# Patient Record
Sex: MALE | Race: BLACK OR AFRICAN AMERICAN | NOT HISPANIC OR LATINO | Employment: OTHER | URBAN - METROPOLITAN AREA
[De-identification: names, ages, dates, MRNs, and addresses within clinical notes are randomized per-mention and may not be internally consistent; named-entity substitution may affect disease eponyms.]

---

## 2017-01-30 ENCOUNTER — OFFICE VISIT (OUTPATIENT)
Dept: ORTHOPEDICS | Facility: CLINIC | Age: 52
End: 2017-01-30
Payer: COMMERCIAL

## 2017-01-30 VITALS
WEIGHT: 245 LBS | HEIGHT: 67 IN | HEART RATE: 89 BPM | SYSTOLIC BLOOD PRESSURE: 135 MMHG | BODY MASS INDEX: 38.45 KG/M2 | DIASTOLIC BLOOD PRESSURE: 95 MMHG

## 2017-01-30 DIAGNOSIS — M25.511 RIGHT SHOULDER PAIN, UNSPECIFIED CHRONICITY: Primary | ICD-10-CM

## 2017-01-30 PROCEDURE — 1159F MED LIST DOCD IN RCRD: CPT | Mod: S$GLB,,, | Performed by: ORTHOPAEDIC SURGERY

## 2017-01-30 PROCEDURE — 99213 OFFICE O/P EST LOW 20 MIN: CPT | Mod: S$GLB,,, | Performed by: ORTHOPAEDIC SURGERY

## 2017-01-30 RX ORDER — HYDROCODONE BITARTRATE AND ACETAMINOPHEN 10; 325 MG/1; MG/1
1 TABLET ORAL EVERY 8 HOURS PRN
Qty: 60 TABLET | Refills: 0 | Status: ON HOLD | OUTPATIENT
Start: 2017-01-30 | End: 2017-03-02 | Stop reason: HOSPADM

## 2017-01-30 NOTE — MR AVS SNAPSHOT
[facility]  [address] [facility] [address]  Phone: [phone]  Fax: [phone]                  José Miguel Garcia   2017 9:30 AM   Office Visit    Description:  Male : 1965   Provider:  Ezekiel Schaefer MD   Department:  [facility]           Reason for Visit     Right Shoulder - Pain           Diagnoses this Visit        Comments    Right shoulder pain, unspecified chronicity    -  Primary            To Do List           Goals (5 Years of Data)     None       These Medications        Disp Refills Start End    hydrocodone-acetaminophen 10-325mg (NORCO)  mg Tab 60 tablet 0 2017     Take 1 tablet by mouth every 8 (eight) hours as needed. - Oral    [facility] [address] [place] [address] AT [facility] [address] Ph #: [phone]         [facility] On Call     [facility] On Call Nurse Care Line -  Assistance  Registered nurses in the [facility] provide clinical advisement, health education, appointment booking, and other advisory services.  Call for this free service at [phone].             Medications           Message regarding Medications     Verify the changes and/or additions to your medication regime listed below are the same as discussed with your clinician today.  If any of these changes or additions are incorrect, please notify your healthcare provider.        START taking these NEW medications        Refills    hydrocodone-acetaminophen 10-325mg (NORCO)  mg Tab 0    Sig: Take 1 tablet by mouth every 8 (eight) hours as needed.    Class: Normal    Route: Oral           Verify that the below list of medications is an accurate representation of the medications you are currently taking.  If none reported, the list may be blank. If incorrect, please contact your healthcare provider. Carry this list with you in case of emergency.           Current Medications      "aspirin (ECOTRIN) 81 MG EC tablet Take 1 tablet (81 mg total) by mouth once daily.    hydrocodone-acetaminophen 7.5-325mg (NORCO) 7.5-325 mg per tablet Take 1 tablet by mouth every 8 (eight) hours as needed for Pain.    hydrocodone-acetaminophen 10-325mg (NORCO)  mg Tab Take 1 tablet by mouth every 8 (eight) hours as needed.           Clinical Reference Information           Vital Signs - Last Recorded  Most recent update: 1/30/2017  9:40 AM by Madi Arroyo RN    BP Pulse Ht Wt BMI    (!) 135/95 89 5' 7" (1.702 m) 111.1 kg (245 lb) 38.37 kg/m2      Blood Pressure          Most Recent Value    BP  (!)  135/95 [Ran to help patient that fell in the parking lot.]      Allergies as of 1/30/2017     No Known Allergies      Immunizations Administered on Date of Encounter - 1/30/2017     None      [facility] Sign-Up     Activating your [facility] account is as easy as 1-2-3!     1) Visit [email or web address], select Sign Up Now, enter this activation code and your date of birth, then select Next.  [email or web address]  Expires: 3/16/2017  4:12 PM      2) Create a username and password to use when you visit [facility] in the future and select a security question in case you lose your password and select Next.    3) Enter your e-mail address and click Sign Up!    Additional Information  If you have questions, please e-mail [email or web address] or call [phone] to talk to our [facility] staff. Remember, [facility] is NOT to be used for urgent needs. For medical emergencies, dial 911.         "

## 2017-01-30 NOTE — PROGRESS NOTES
Past Medical History   Diagnosis Date    DVT (deep venous thrombosis) 2011     Approx. 2011, after having a bunion removed. pt was on warfarin for approx. 6 weeks. unsure of time    Screen for colon cancer 11/17/2016       Past Surgical History   Procedure Laterality Date    Vasectomy Bilateral 2011    Bunionectomy Left 2006     Pt developed a blood clot in leg, after having bunion removed. Had to do coumadin for approx. 6 weeks.    Bunionectomy Left 2010     complicated by dvt    Knee surgery       Patient does not recall which side    Colonoscopy N/A 11/17/2016     Procedure: COLONOSCOPY;  Surgeon: Keegan Vo MD;  Location: [facility];  Service: Endoscopy;  Laterality: N/A;       Current Outpatient Prescriptions   Medication Sig    aspirin (ECOTRIN) 81 MG EC tablet Take 1 tablet (81 mg total) by mouth once daily.    hydrocodone-acetaminophen 7.5-325mg (NORCO) 7.5-325 mg per tablet Take 1 tablet by mouth every 8 (eight) hours as needed for Pain.     No current facility-administered medications for this visit.        Review of patient's allergies indicates:  No Known Allergies    Family History   Problem Relation Age of Onset    Heart block Mother     Kidney disease Mother     Cancer Paternal Aunt        Social History     Social History    Marital status: Single     Spouse name: N/A    Number of children: N/A    Years of education: N/A     Occupational History    Not on file.     Social History Main Topics    Smoking status: Never Smoker    Smokeless tobacco: Not on file    Alcohol use Yes    Drug use: No    Sexual activity: Not on file     Other Topics Concern    Not on file     Social History Narrative       Chief Complaint:   Chief Complaint   Patient presents with    Right Shoulder - Pain       Consulting Physician: No ref. provider found    History of present illness:    This is a 51 y.o. year old male who complains of right shoulder pain since an injury on 11/17/16.  He states  "that he was reaching back to  a 16 pound mall and heard a pop in his shoulder.  At the time he was unable to move his shoulder due to weakness and pain.  Since then it has gotten somewhat better but he still has pain that he puts his much as a 7 out of 10 with activities and certain positions.  He states his shoulder also feels weak.    Review of Systems:    Constitution: Denies chills, fever, and sweats.  HENT: Denies headaches or blurry vision.  Cardiovascular: Denies chest pain or irregular heart beat.  Respiratory: Denies cough or shortness of breath.  Gastrointestinal: Denies abdominal pain, nausea, or vomiting.  Musculoskeletal:  Denies muscle cramps.  Neurological: Denies dizziness or focal weakness.  Psychiatric/Behavioral: Normal mental status.  Hematologic/Lymphatic: Denies bleeding problem or easy bruising/bleeding.  Skin: Denies rash or suspicious lesions.    Examination:    Vital Signs:    Vitals:    01/30/17 0935   BP: (!) 135/95   Pulse: 89   Weight: 111.1 kg (245 lb)   Height: 5' 7" (1.702 m)   PainSc:   7   PainLoc: Shoulder       Body mass index is 38.37 kg/(m^2).    This a well-developed, well nourished patient in no acute distress.    Alert and oriented and cooperative to examination.       Physical Exam: Right Shoulder Exam     Skin  Rash:   None  Scars:   None    Inspection/Observation   Swelling:   none  Erythema:   none  Bruising:   none  Wounds:   none  Scapular Winging:  none  Scapular Dyskinesia:  none  Atrophy:   none  Masses:   None  Lymphadenopathy: None    Tenderness   AC Joint:   mild    Range of Motion   Active Forward Flexion:  140  Active External Rotation:  45  Active Internal Rotation:  Hip pocket    Passive Forward Flexion:  160  Passive External Rotation:  45    Muscle Strength   Forward Flexion:  4/5  External Rotation:  5/5  Internal Rotation:  4+/5    Tests & Signs   Cross Arm:   positive  Impingement:   positive    Other   Sensation:   normal  Pulse:    2+ " radial          Imaging: The MRI of his right shoulder shows some tendinosis of the supraspinatus along with what appears to be a SLAP tear.  He also has biceps tendinitis.  He also has arthritis of the acromioclavicular joint.     Assessment: Right shoulder pain, unspecified chronicity        Plan: We discussed with him that I recommended course of action would be a distal clavicle resection and biceps tenodesis to address a SLAP tear.  I don't think he necessarily needs to have a cuff debridement or repair but we can see at the time of surgery.  He would like to talk to his work and see when he can schedule the surgery.  We did discuss the risk and benefits of surgery.    DISCLAIMER: This note may have been dictated using voice recognition software and may contain grammatical errors.     NOTE: Consult report sent to referring provider via [employer] EMR.

## 2017-02-06 ENCOUNTER — TELEPHONE (OUTPATIENT)
Dept: ORTHOPEDICS | Facility: CLINIC | Age: 52
End: 2017-02-06

## 2017-02-06 NOTE — TELEPHONE ENCOUNTER
----- Message from Magan Driver sent at 2/6/2017 11:17 AM CST -----  Contact: Patient  Patient is ready to schedule his surgery.  He is interested in scheduling it for February 16 if at all possible.  He asked to be called on his cell, but also on his work phone.  If you leave a message he'll get right back to you.  Work is [phone].

## 2017-02-06 NOTE — TELEPHONE ENCOUNTER
Returned pt's call.  Advised that surgery can be scheduled for 2/16, however will be at our [place] location.  Pt was agreeable to this.  Pt will come to clinic 2/8/16 to sign surgical consent and get surgical packet for pre op.

## 2017-02-13 PROBLEM — D64.9 ANEMIA: Status: ACTIVE | Noted: 2017-02-13

## 2017-02-15 ENCOUNTER — TELEPHONE (OUTPATIENT)
Dept: ORTHOPEDICS | Facility: CLINIC | Age: 52
End: 2017-02-15

## 2017-02-15 NOTE — TELEPHONE ENCOUNTER
Left message for patient that surgery was denied by his insurance. No physician available for a peer to peer until Friday. Will complete peer to peer on Friday @ 11:30.

## 2017-02-15 NOTE — TELEPHONE ENCOUNTER
----- Message from Vandana Tomlinson sent at 2/15/2017  9:46 AM CST -----  Contact: Josi - Pre-service - service has been denied  Patients surgery has been denied for tomorrow. [id number] can be scheduled at [phone] with pending case number [id number]

## 2017-03-01 ENCOUNTER — ANESTHESIA EVENT (OUTPATIENT)
Dept: SURGERY | Facility: HOSPITAL | Age: 52
End: 2017-03-01
Payer: COMMERCIAL

## 2017-03-02 ENCOUNTER — HOSPITAL ENCOUNTER (OUTPATIENT)
Facility: HOSPITAL | Age: 52
Discharge: HOME | End: 2017-03-02
Attending: ORTHOPAEDIC SURGERY | Admitting: ORTHOPAEDIC SURGERY
Payer: COMMERCIAL

## 2017-03-02 ENCOUNTER — ANESTHESIA (OUTPATIENT)
Dept: SURGERY | Facility: HOSPITAL | Age: 52
End: 2017-03-02
Payer: COMMERCIAL

## 2017-03-02 ENCOUNTER — SURGERY (OUTPATIENT)
Age: 52
End: 2017-03-02

## 2017-03-02 VITALS
HEIGHT: 67 IN | DIASTOLIC BLOOD PRESSURE: 97 MMHG | SYSTOLIC BLOOD PRESSURE: 151 MMHG | WEIGHT: 245 LBS | BODY MASS INDEX: 38.45 KG/M2 | OXYGEN SATURATION: 100 % | HEART RATE: 71 BPM | RESPIRATION RATE: 16 BRPM | TEMPERATURE: 98 F

## 2017-03-02 DIAGNOSIS — M25.511 RIGHT SHOULDER PAIN, UNSPECIFIED CHRONICITY: ICD-10-CM

## 2017-03-02 PROCEDURE — 76942 ECHO GUIDE FOR BIOPSY: CPT | Performed by: ANESTHESIOLOGY

## 2017-03-02 PROCEDURE — D9220A PRA ANESTHESIA: Mod: CRNA,,, | Performed by: NURSE ANESTHETIST, CERTIFIED REGISTERED

## 2017-03-02 PROCEDURE — 63600175 PHARM REV CODE 636 W HCPCS: Performed by: NURSE ANESTHETIST, CERTIFIED REGISTERED

## 2017-03-02 PROCEDURE — D9220A PRA ANESTHESIA: Mod: ANES,,, | Performed by: ANESTHESIOLOGY

## 2017-03-02 PROCEDURE — 99900103 DSU ONLY-NO CHARGE-INITIAL HR (STAT): Performed by: ORTHOPAEDIC SURGERY

## 2017-03-02 PROCEDURE — 29826 SHO ARTHRS SRG DECOMPRESSION: CPT | Mod: RT,,, | Performed by: ORTHOPAEDIC SURGERY

## 2017-03-02 PROCEDURE — 71000015 HC POSTOP RECOV 1ST HR: Performed by: ORTHOPAEDIC SURGERY

## 2017-03-02 PROCEDURE — 36000710: Performed by: ORTHOPAEDIC SURGERY

## 2017-03-02 PROCEDURE — 29827 SHO ARTHRS SRG RT8TR CUF RPR: CPT | Mod: RT,,, | Performed by: ORTHOPAEDIC SURGERY

## 2017-03-02 PROCEDURE — 76942 ECHO GUIDE FOR BIOPSY: CPT | Mod: 26,,, | Performed by: ANESTHESIOLOGY

## 2017-03-02 PROCEDURE — 25000003 PHARM REV CODE 250: Performed by: NURSE ANESTHETIST, CERTIFIED REGISTERED

## 2017-03-02 PROCEDURE — 36000711: Performed by: ORTHOPAEDIC SURGERY

## 2017-03-02 PROCEDURE — 64415 NJX AA&/STRD BRCH PLXS IMG: CPT | Mod: 59,RT,, | Performed by: ANESTHESIOLOGY

## 2017-03-02 PROCEDURE — 99900104 DSU ONLY-NO CHARGE-EA ADD'L HR (STAT): Performed by: ORTHOPAEDIC SURGERY

## 2017-03-02 PROCEDURE — 25000003 PHARM REV CODE 250: Performed by: ORTHOPAEDIC SURGERY

## 2017-03-02 PROCEDURE — 27201423 OPTIME MED/SURG SUP & DEVICES STERILE SUPPLY: Performed by: ORTHOPAEDIC SURGERY

## 2017-03-02 PROCEDURE — 71000016 HC POSTOP RECOV ADDL HR: Performed by: ORTHOPAEDIC SURGERY

## 2017-03-02 PROCEDURE — 37000008 HC ANESTHESIA 1ST 15 MINUTES: Performed by: ORTHOPAEDIC SURGERY

## 2017-03-02 PROCEDURE — 29824 SHO ARTHRS SRG DSTL CLAVICLC: CPT | Mod: 51,RT,, | Performed by: ORTHOPAEDIC SURGERY

## 2017-03-02 PROCEDURE — 25000003 PHARM REV CODE 250: Performed by: ANESTHESIOLOGY

## 2017-03-02 PROCEDURE — 63600175 PHARM REV CODE 636 W HCPCS: Performed by: ORTHOPAEDIC SURGERY

## 2017-03-02 PROCEDURE — C1713 ANCHOR/SCREW BN/BN,TIS/BN: HCPCS | Performed by: ORTHOPAEDIC SURGERY

## 2017-03-02 PROCEDURE — 37000009 HC ANESTHESIA EA ADD 15 MINS: Performed by: ORTHOPAEDIC SURGERY

## 2017-03-02 PROCEDURE — 71000039 HC RECOVERY, EACH ADD'L HOUR: Performed by: ORTHOPAEDIC SURGERY

## 2017-03-02 PROCEDURE — 71000033 HC RECOVERY, INTIAL HOUR: Performed by: ORTHOPAEDIC SURGERY

## 2017-03-02 DEVICE — ANCHOR VERSALOK W/ORTHOCORD: Type: IMPLANTABLE DEVICE | Site: SHOULDER | Status: FUNCTIONAL

## 2017-03-02 DEVICE — ANCHOR HEALIX 5.5 W/NEEDLE: Type: IMPLANTABLE DEVICE | Site: SHOULDER | Status: FUNCTIONAL

## 2017-03-02 RX ORDER — MIDAZOLAM HYDROCHLORIDE 1 MG/ML
INJECTION INTRAMUSCULAR; INTRAVENOUS
Status: DISCONTINUED | OUTPATIENT
Start: 2017-03-02 | End: 2017-03-02

## 2017-03-02 RX ORDER — IBUPROFEN 400 MG/1
800 TABLET ORAL 3 TIMES DAILY
Status: CANCELLED | OUTPATIENT
Start: 2017-03-02

## 2017-03-02 RX ORDER — CEFAZOLIN SODIUM 2 G/50ML
2 SOLUTION INTRAVENOUS
Status: COMPLETED | OUTPATIENT
Start: 2017-03-02 | End: 2017-03-02

## 2017-03-02 RX ORDER — ONDANSETRON HYDROCHLORIDE 2 MG/ML
INJECTION, SOLUTION INTRAMUSCULAR; INTRAVENOUS
Status: DISCONTINUED | OUTPATIENT
Start: 2017-03-02 | End: 2017-03-02

## 2017-03-02 RX ORDER — HYDROCODONE BITARTRATE AND ACETAMINOPHEN 10; 325 MG/1; MG/1
1 TABLET ORAL EVERY 4 HOURS PRN
Status: CANCELLED | OUTPATIENT
Start: 2017-03-02

## 2017-03-02 RX ORDER — LIDOCAINE HCL/PF 100 MG/5ML
SYRINGE (ML) INTRAVENOUS
Status: DISCONTINUED | OUTPATIENT
Start: 2017-03-02 | End: 2017-03-02

## 2017-03-02 RX ORDER — FENTANYL CITRATE 50 UG/ML
INJECTION, SOLUTION INTRAMUSCULAR; INTRAVENOUS
Status: DISCONTINUED | OUTPATIENT
Start: 2017-03-02 | End: 2017-03-02

## 2017-03-02 RX ORDER — PROPOFOL 10 MG/ML
VIAL (ML) INTRAVENOUS
Status: DISCONTINUED | OUTPATIENT
Start: 2017-03-02 | End: 2017-03-02

## 2017-03-02 RX ORDER — ROCURONIUM BROMIDE 10 MG/ML
INJECTION, SOLUTION INTRAVENOUS
Status: DISCONTINUED | OUTPATIENT
Start: 2017-03-02 | End: 2017-03-02

## 2017-03-02 RX ORDER — OXYCODONE AND ACETAMINOPHEN 10; 325 MG/1; MG/1
1 TABLET ORAL EVERY 6 HOURS PRN
Qty: 60 TABLET | Refills: 0 | Status: SHIPPED | OUTPATIENT
Start: 2017-03-02 | End: 2017-03-15 | Stop reason: SDUPTHER

## 2017-03-02 RX ORDER — DEXAMETHASONE SODIUM PHOSPHATE 4 MG/ML
INJECTION, SOLUTION INTRA-ARTICULAR; INTRALESIONAL; INTRAMUSCULAR; INTRAVENOUS; SOFT TISSUE
Status: DISCONTINUED | OUTPATIENT
Start: 2017-03-02 | End: 2017-03-02

## 2017-03-02 RX ORDER — SODIUM CHLORIDE 0.9 % (FLUSH) 0.9 %
3 SYRINGE (ML) INJECTION
Status: DISCONTINUED | OUTPATIENT
Start: 2017-03-02 | End: 2017-03-02 | Stop reason: HOSPADM

## 2017-03-02 RX ORDER — BUPIVACAINE HYDROCHLORIDE 5 MG/ML
INJECTION, SOLUTION EPIDURAL; INTRACAUDAL
Status: DISCONTINUED | OUTPATIENT
Start: 2017-03-02 | End: 2017-03-02 | Stop reason: HOSPADM

## 2017-03-02 RX ORDER — SUCCINYLCHOLINE CHLORIDE 20 MG/ML
INJECTION INTRAMUSCULAR; INTRAVENOUS
Status: DISCONTINUED | OUTPATIENT
Start: 2017-03-02 | End: 2017-03-02

## 2017-03-02 RX ORDER — LIDOCAINE HYDROCHLORIDE 10 MG/ML
1 INJECTION, SOLUTION EPIDURAL; INFILTRATION; INTRACAUDAL; PERINEURAL ONCE
Status: DISCONTINUED | OUTPATIENT
Start: 2017-03-02 | End: 2017-03-02 | Stop reason: HOSPADM

## 2017-03-02 RX ORDER — FENTANYL CITRATE 50 UG/ML
25 INJECTION, SOLUTION INTRAMUSCULAR; INTRAVENOUS EVERY 5 MIN PRN
Status: DISCONTINUED | OUTPATIENT
Start: 2017-03-02 | End: 2017-03-02 | Stop reason: HOSPADM

## 2017-03-02 RX ORDER — OXYCODONE HYDROCHLORIDE 5 MG/1
5 TABLET ORAL
Status: DISCONTINUED | OUTPATIENT
Start: 2017-03-02 | End: 2017-03-02 | Stop reason: HOSPADM

## 2017-03-02 RX ORDER — HYDROCODONE BITARTRATE AND ACETAMINOPHEN 5; 325 MG/1; MG/1
1 TABLET ORAL EVERY 4 HOURS PRN
Status: CANCELLED | OUTPATIENT
Start: 2017-03-02

## 2017-03-02 RX ORDER — EPINEPHRINE 1 MG/ML
INJECTION, SOLUTION INTRACARDIAC; INTRAMUSCULAR; INTRAVENOUS; SUBCUTANEOUS
Status: DISCONTINUED | OUTPATIENT
Start: 2017-03-02 | End: 2017-03-02 | Stop reason: HOSPADM

## 2017-03-02 RX ORDER — IBUPROFEN 600 MG/1
600 TABLET ORAL 4 TIMES DAILY
Qty: 60 TABLET | Refills: 0 | Status: SHIPPED | OUTPATIENT
Start: 2017-03-02 | End: 2017-03-15 | Stop reason: SDUPTHER

## 2017-03-02 RX ORDER — SODIUM CHLORIDE, SODIUM LACTATE, POTASSIUM CHLORIDE, CALCIUM CHLORIDE 600; 310; 30; 20 MG/100ML; MG/100ML; MG/100ML; MG/100ML
INJECTION, SOLUTION INTRAVENOUS CONTINUOUS
Status: DISCONTINUED | OUTPATIENT
Start: 2017-03-02 | End: 2017-03-02 | Stop reason: HOSPADM

## 2017-03-02 RX ORDER — METOCLOPRAMIDE HYDROCHLORIDE 5 MG/ML
10 INJECTION INTRAMUSCULAR; INTRAVENOUS EVERY 10 MIN PRN
Status: DISCONTINUED | OUTPATIENT
Start: 2017-03-02 | End: 2017-03-02 | Stop reason: HOSPADM

## 2017-03-02 RX ADMIN — ROCURONIUM BROMIDE 40 MG: 10 INJECTION, SOLUTION INTRAVENOUS at 07:03

## 2017-03-02 RX ADMIN — PROPOFOL 200 MG: 10 INJECTION, EMULSION INTRAVENOUS at 07:03

## 2017-03-02 RX ADMIN — DEXAMETHASONE SODIUM PHOSPHATE 4 MG: 4 INJECTION, SOLUTION INTRAMUSCULAR; INTRAVENOUS at 07:03

## 2017-03-02 RX ADMIN — SODIUM CHLORIDE, SODIUM LACTATE, POTASSIUM CHLORIDE, AND CALCIUM CHLORIDE: .6; .31; .03; .02 INJECTION, SOLUTION INTRAVENOUS at 06:03

## 2017-03-02 RX ADMIN — CEFAZOLIN SODIUM 2 G: 2 SOLUTION INTRAVENOUS at 08:03

## 2017-03-02 RX ADMIN — ROCURONIUM BROMIDE 10 MG: 10 INJECTION, SOLUTION INTRAVENOUS at 07:03

## 2017-03-02 RX ADMIN — MIDAZOLAM HYDROCHLORIDE 1 MG: 1 INJECTION, SOLUTION INTRAMUSCULAR; INTRAVENOUS at 06:03

## 2017-03-02 RX ADMIN — EPINEPHRINE 1 MG: 1 INJECTION, SOLUTION INTRAMUSCULAR; SUBCUTANEOUS at 08:03

## 2017-03-02 RX ADMIN — BUPIVACAINE HYDROCHLORIDE 30 ML: 5 INJECTION, SOLUTION EPIDURAL; INTRACAUDAL; PERINEURAL at 10:03

## 2017-03-02 RX ADMIN — ONDANSETRON 4 MG: 2 INJECTION, SOLUTION INTRAMUSCULAR; INTRAVENOUS at 07:03

## 2017-03-02 RX ADMIN — FENTANYL CITRATE 50 MCG: 50 INJECTION, SOLUTION INTRAMUSCULAR; INTRAVENOUS at 06:03

## 2017-03-02 RX ADMIN — SUCCINYLCHOLINE CHLORIDE 140 MG: 20 INJECTION, SOLUTION INTRAMUSCULAR; INTRAVENOUS at 07:03

## 2017-03-02 RX ADMIN — FENTANYL CITRATE 50 MCG: 50 INJECTION, SOLUTION INTRAMUSCULAR; INTRAVENOUS at 10:03

## 2017-03-02 RX ADMIN — LIDOCAINE HYDROCHLORIDE 100 MG: 20 INJECTION, SOLUTION INTRAVENOUS at 07:03

## 2017-03-02 RX ADMIN — MIDAZOLAM HYDROCHLORIDE 1 MG: 1 INJECTION, SOLUTION INTRAMUSCULAR; INTRAVENOUS at 07:03

## 2017-03-02 NOTE — PLAN OF CARE
"Patient awake and calm, discussed wanting to eat a "olpes egg and cheese sandwich", pt tolerating clear liquid no nausea, denies pain.  Discussed NWB with his right extremity  Pt is able to maintain airway   On room air 100 %  "

## 2017-03-02 NOTE — ANESTHESIA POSTPROCEDURE EVALUATION
"Anesthesia Post Evaluation    Patient: José Miguel Garcia    Procedure(s) Performed: Procedure(s) (LRB):  REPAIR ROTATOR CUFF ARTHROSCOPIC (Right)  [id number]-[id number]-[id number] END (Right)  ARTHROSCOPY-SHOULDER WITH SUBACROMIAL DECOMPRESSION (Right)    Final Anesthesia Type: general  Patient location during evaluation: PACU  Patient participation: Yes- Able to Participate  Level of consciousness: awake and alert and oriented  Post-procedure vital signs: reviewed and stable  Pain management: adequate  Airway patency: patent  PONV status at discharge: No PONV  Anesthetic complications: no      Cardiovascular status: blood pressure returned to baseline  Respiratory status: unassisted, spontaneous ventilation and room air  Hydration status: euvolemic  Follow-up not needed.        Visit Vitals    BP (!) 163/90    Pulse 90    Temp 36.4 °C (97.6 °F) (Oral)    Resp 18    Ht 5' 7" (1.702 m)    Wt 111.1 kg (245 lb)    SpO2 100%    BMI 38.37 kg/m2       Pain/Shyanne Score: Pain Assessment Performed: Yes (3/2/2017 10:30 AM)  Presence of Pain: non-verbal indicators absent (3/2/2017 10:30 AM)  Shyanne Score: 4 (3/2/2017 10:30 AM)      "

## 2017-03-02 NOTE — H&P
Past Medical History   Diagnosis Date    DVT (deep venous thrombosis) 2011       Approx. 2011, after having a bunion removed. pt was on warfarin for approx. 6 weeks. unsure of time    Screen for colon cancer 11/17/2016                Past Surgical History   Procedure Laterality Date    Vasectomy Bilateral 2011    Bunionectomy Left 2006       Pt developed a blood clot in leg, after having bunion removed. Had to do coumadin for approx. 6 weeks.    Bunionectomy Left 2010       complicated by dvt    Knee surgery           Patient does not recall which side    Colonoscopy N/A 11/17/2016       Procedure: COLONOSCOPY; Surgeon: Keegan Vo MD; Location: [facility]; Service: Endoscopy; Laterality: N/A;              Current Outpatient Prescriptions   Medication Sig    aspirin (ECOTRIN) 81 MG EC tablet Take 1 tablet (81 mg total) by mouth once daily.    hydrocodone-acetaminophen 7.5-325mg (NORCO) 7.5-325 mg per tablet Take 1 tablet by mouth every 8 (eight) hours as needed for Pain.      No current facility-administered medications for this visit.          Review of patient's allergies indicates:  No Known Allergies           Family History   Problem Relation Age of Onset    Heart block Mother      Kidney disease Mother      Cancer Paternal Aunt            Social History    Social History            Social History    Marital status: Single       Spouse name: N/A    Number of children: N/A    Years of education: N/A          Occupational History    Not on file.           Social History Main Topics    Smoking status: Never Smoker    Smokeless tobacco: Not on file    Alcohol use Yes    Drug use: No    Sexual activity: Not on file           Other Topics Concern    Not on file      Social History Narrative            Chief Complaint:       Chief Complaint   Patient presents with    Right Shoulder - Pain         Consulting Physician: No ref. provider found     History of present illness:     This is a 51  y.o. year old male who complains of right shoulder pain since an injury on 11/17/16. He states that he was reaching back to  a 16 pound mall and heard a pop in his shoulder. At the time he was unable to move his shoulder due to weakness and pain. Since then it has gotten somewhat better but he still has pain that he puts his much as a 7 out of 10 with activities and certain positions. He states his shoulder also feels weak.     Review of Systems:     Constitution: Denies chills, fever, and sweats.  HENT: Denies headaches or blurry vision.  Cardiovascular: Denies chest pain or irregular heart beat.  Respiratory: Denies cough or shortness of breath.  Gastrointestinal: Denies abdominal pain, nausea, or vomiting.  Musculoskeletal: Denies muscle cramps.  Neurological: Denies dizziness or focal weakness.  Psychiatric/Behavioral: Normal mental status.  Hematologic/Lymphatic: Denies bleeding problem or easy bruising/bleeding.  Skin: Denies rash or suspicious lesions.     Examination:      Body mass index is 38.37 kg/(m^2).     This a well-developed, well nourished patient in no acute distress.     Alert and oriented and cooperative to examination.      Physical Exam: Right Shoulder Exam      Skin  Rash:   None  Scars:   None     Inspection/Observation   Swelling:   none  Erythema:   none  Bruising:   none  Wounds:   none  Scapular Winging:  none  Scapular Dyskinesia:  none  Atrophy:   none  Masses:   None  Lymphadenopathy: None     Tenderness   AC Joint:   mild     Range of Motion   Active Forward Flexion:  140  Active External Rotation:  45  Active Internal Rotation:  Hip pocket     Passive Forward Flexion:  160  Passive External Rotation:  45     Muscle Strength   Forward Flexion:  4/5  External Rotation:  5/5  Internal Rotation:  4+/5     Tests & Signs   Cross Arm:   positive  Impingement:   positive     Other   Sensation:   normal  Pulse:    2+ radial              Imaging: The MRI of his right shoulder shows some  tendinosis of the supraspinatus along with what appears to be a SLAP tear. He also has biceps tendinitis. He also has arthritis of the acromioclavicular joint.  Assessment: Right shoulder pain, unspecified chronicity           Plan: We discussed with him that I recommended course of action would be a distal clavicle resection and biceps tenodesis to address a SLAP tear. I don't think he necessarily needs to have a cuff debridement or repair but we can see at the time of surgery. He would like to talk to his work and see when he can schedule the surgery. We did discuss the risk and benefits of surgery.     DISCLAIMER: This note may have been dictated using voice recognition software and may contain grammatical errors.

## 2017-03-02 NOTE — TRANSFER OF CARE
"Anesthesia Transfer of Care Note    Patient: José Miguel Garcia    Procedure(s) Performed: Procedure(s) (LRB):  REPAIR ROTATOR CUFF ARTHROSCOPIC (Right)  [id number]-[id number] END (Right)  ARTHROSCOPY-SHOULDER WITH SUBACROMIAL DECOMPRESSION (Right)    Patient location: PACU    Anesthesia Type: general and regional    Transport from OR: Transported from OR on 2-3 L/min O2 by NC with adequate spontaneous ventilation    Post pain: adequate analgesia    Post assessment: no apparent anesthetic complications and tolerated procedure well    Post vital signs: stable    Level of consciousness: awake    Nausea/Vomiting: no nausea/vomiting    Complications: none          Last vitals:   Visit Vitals    BP (!) 148/87 (BP Location: Left arm, Patient Position: Lying, BP Method: Automatic)    Pulse 69    Temp 36.7 °C (98.1 °F) (Oral)    Resp 18    Ht 5' 7" (1.702 m)    Wt 111.1 kg (245 lb)    SpO2 100%    BMI 38.37 kg/m2     "

## 2017-03-02 NOTE — BRIEF OP NOTE
[facility]  Brief Operative Note     SUMMARY     Surgery Date: 3/2/2017     Surgeon(s) and Role:     * Ezekiel Schaefer MD - Primary    Assisting Surgeon: None    Pre-op Diagnosis:  Right shoulder pain, unspecified chronicity [M25.511]    Post-op Diagnosis:  Post-Op Diagnosis Codes:     * Right shoulder pain, unspecified chronicity [M25.511]    Procedure(s) (LRB):  REPAIR ROTATOR CUFF ARTHROSCOPIC (Right)  [id number]-[id number] END (Right)  ARTHROSCOPY-SHOULDER WITH SUBACROMIAL DECOMPRESSION (Right)    Anesthesia: General    Description of the findings of the procedure: right arthroscopic rotator cuff repair, distal clavicle resection, subacromial decompression    Findings/Key Components: See Dictation     Estimated Blood Loss: * No values recorded between 3/2/2017  8:30 AM and 3/2/2017 10:16 AM *         Specimens:   Specimen     None          Discharge Note    SUMMARY     Admit Date: 3/2/2017    Discharge Date and Time: 3/2/2017     Hospital Course : Patient Tolerated Procedure Well      Final Diagnosis: Post-Op Diagnosis Codes:     * Right shoulder pain, unspecified chronicity [M25.511]    Disposition: Home or Self Care    Follow Up/Patient Instructions:     Medications:  Reconciled Home Medications: Current Discharge Medication List      START taking these medications    Details   ibuprofen (ADVIL,MOTRIN) 600 MG tablet Take 1 tablet (600 mg total) by mouth 4 (four) times daily.  Qty: 60 tablet, Refills: 0      oxycodone-acetaminophen (PERCOCET)  mg per tablet Take 1 tablet by mouth every 6 (six) hours as needed for Pain.  Qty: 60 tablet, Refills: 0         STOP taking these medications       hydrocodone-acetaminophen 10-325mg (NORCO)  mg Tab Comments:   Reason for Stopping:               Discharge Procedure Orders  Diet general     Activity as tolerated     Shower on day dressing removed (No bath)     Ice to affected area     Lifting restrictions     Non weight bearing     No  driving, operating heavy equipment or signing legal documents while taking pain medication     Call MD for:  temperature >100.4     Call MD for:  persistent nausea and vomiting     Call MD for:  severe uncontrolled pain     Call MD for:  difficulty breathing, headache or visual disturbances     Call MD for:  redness, tenderness, or signs of infection (pain, swelling, redness, odor or green/yellow discharge around incision site)     Call MD for:  hives     Call MD for:  persistent dizziness or light-headedness     Call MD for:  extreme fatigue     Remove dressing in 48 hours       Follow-up Information     Follow up with Ezekiel Schaefer MD.    Specialties:  Sports Medicine, Orthopedic Surgery    Contact information:    [address] [place] [id number]  [phone]        Dictation #1  MRN:[id number]  CSN:[id number]  [id number]

## 2017-03-02 NOTE — OR NURSING
Discharge instructions discussed with patient and father.  Informed of e-prescribed rx to pharmacy of record.  Patient instructed on use of ice man and immolizer at all times except during showering until his follow up appointment with Dr. Schaefer.  Verbalized understanding.

## 2017-03-02 NOTE — IP AVS SNAPSHOT
[address] [place] [address]  Phone: [phone]           Patient Discharge Instructions     Our goal is to set you up for success. This packet includes information on your condition, medications, and your home care. It will help you to care for yourself so you don't get sicker and need to go back to the hospital.     Please ask your nurse if you have any questions.        There are many details to remember when preparing to leave the hospital. Here is what you will need to do:    1. Take your medicine. If you are prescribed medications, review your Medication List in the following pages. You may have new medications to  at the pharmacy and others that you'll need to stop taking. Review the instructions for how and when to take your medications. Talk with your doctor or nurses if you are unsure of what to do.     2. Go to your follow-up appointments. Specific follow-up information is listed in the following pages. Your may be contacted by a transition nurse or clinical provider about future appointments. Be sure we have all of the phone numbers to reach you, if needed. Please contact your provider's office if you are unable to make an appointment.     3. Watch for warning signs. Your doctor or nurse will give you detailed warning signs to watch for and when to call for assistance. These instructions may also include educational information about your condition. If you experience any of warning signs to your health, call your doctor.               [facility]  Unless otherwise directed by your provider, please contact [facility], our nurse care line that is available for 24/7 assistance.     [phone] (toll-free)    Registered nurses in the [facility] provide clinical advisement, health education, appointment booking, and other advisory services.                    ** Verify the list of medication(s) below is accurate and up to date.  Carry this with you in case of emergency. If your medications have changed, please notify your healthcare provider.             Medication List      START taking these medications        Additional Info                      ibuprofen 600 MG tablet   Commonly known as:  ADVIL,MOTRIN   Quantity:  60 tablet   Refills:  0   Dose:  600 mg    Instructions:  Take 1 tablet (600 mg total) by mouth 4 (four) times daily.     Begin Date    AM    Noon    PM    Bedtime       oxycodone-acetaminophen  mg per tablet   Commonly known as:  PERCOCET   Quantity:  60 tablet   Refills:  0   Dose:  1 tablet    Instructions:  Take 1 tablet by mouth every 6 (six) hours as needed for Pain.     Begin Date    AM    Noon    PM    Bedtime         STOP taking these medications     hydrocodone-acetaminophen 10-325mg  mg Tab   Commonly known as:  NORCO            Where to Get Your Medications      These medications were sent to [facility] [address] [place] [address] AT [facility] [address] [address]     Phone:  [phone]     ibuprofen 600 MG tablet    oxycodone-acetaminophen  mg per tablet                  Please bring to all follow up appointments:    1. A copy of your discharge instructions.  2. All medicines you are currently taking in their original bottles.  3. Identification and insurance card.    Please arrive 15 minutes ahead of scheduled appointment time.    Please call 24 hours in advance if you must reschedule your appointment and/or time.        Your Scheduled Appointments     Mar 15, 2017 10:15 AM CDT   Post OP with Ezekiel Schaefer MD   [place] [facility]    [address] [facility] [place] [address] [phone]            Mar 17, 2017  8:30 AM CDT   Established Patient with Lisa Y. Cooksey, NP Rowe S. Crowder III, M.D. ([facility])    [address]  [facility] [place] [address] [phone]            Oct 10, 2017  9:00 AM CDT    LAB-OCC with  - LAB   Abigail Wang III, M.D. (OCC)    [address]  [facility] [place] [address] [phone]            Oct 13, 2017  9:50 AM CDT   Established Patient with MD Abigail Sprague III, III, M.D. (OCC)    [address]  [facility] [place] [address] [phone]              Follow-up Information     Follow up with Ezekiel Schaefer MD.    Specialties:  Sports Medicine, Orthopedic Surgery    Contact information:    [address] [facility] [place] [address] [phone]          Discharge Instructions     Future Orders    Activity as tolerated     Call MD for:  difficulty breathing, headache or visual disturbances     Call MD for:  extreme fatigue     Call MD for:  hives     Call MD for:  persistent dizziness or light-headedness     Call MD for:  persistent nausea and vomiting     Call MD for:  redness, tenderness, or signs of infection (pain, swelling, redness, odor or green/yellow discharge around incision site)     Call MD for:  severe uncontrolled pain     Call MD for:  temperature >100.4     Diet general     Questions:    Total calories:      Fat restriction, if any:      Protein restriction, if any:      Na restriction, if any:      Fluid restriction:      Additional restrictions:      Lifting restrictions     No driving, operating heavy equipment or signing legal documents while taking pain medication     Non weight bearing     Remove dressing in 48 hours     Shower on day dressing removed (No bath)         Discharge Instructions       We hope your stay was comfortable as you heal now, mend and rest.    For we have enjoyed taking care of you by giving your our best.    And as you get better, by regaining your health and strength;   We count it as a privilege to have served you and hope your time at [facility] was well spent.      Thank  You!!!Discharge Instructions: After Your Surgery/Procedure  Youve just had surgery. During surgery you were given medicine  "called anesthesia to keep you relaxed and free of pain. After surgery you may have some pain or nausea. This is common. Here are some tips for feeling better and getting well after surgery.     Stay on schedule with your medication.   Going home  Your doctor or nurse will show you how to take care of yourself when you go home. He or she will also answer your questions. Have an adult family member or friend drive you home.      For your safety we recommend these precaution for the first 24 hours after your procedure:  · Do not drive or use heavy equipment.  · Do not make important decisions or sign legal papers.  · Do not drink alcohol.  · Have someone stay with you, if needed. He or she can watch for problems and help keep you safe.  · Your concentration, balance, coordination, and judgement may be impaired for many hours after anesthesia.  Use caution when ambulating or standing up.     · You may feel weak and "washed out" after anesthesia and surgery.      Subtle residual effects of general anesthesia or sedation with regional / local anesthesia can last more than 24 hours.  Rest for the remainder of the day or longer if your Doctor/Surgeon has advised you to do so.  Although you may feel normal within the first 24 hours, your reflexes and mental ability may be impaired without you realizing it.  You may feel dizzy, lightheaded or sleepy for 24 hours or longer.      Be sure to go to all follow-up visits with your doctor. And rest after your surgery for as long as your doctor tells you to.  Coping with pain  If you have pain after surgery, pain medicine will help you feel better. Take it as told, before pain becomes severe. Also, ask your doctor or pharmacist about other ways to control pain. This might be with heat, ice, or relaxation. And follow any other instructions your surgeon or nurse gives you.  Tips for taking pain medicine  To get the best relief possible, remember these points:  · Pain medicines can " upset your stomach. Taking them with a little food may help.  · Most pain relievers taken by mouth need at least 20 to 30 minutes to start to work.  · Taking medicine on a schedule can help you remember to take it. Try to time your medicine so that you can take it before starting an activity. This might be before you get dressed, go for a walk, or sit down for dinner.  · Constipation is a common side effect of pain medicines. Call your doctor before taking any medicines such as laxatives or stool softeners to help ease constipation. Also ask if you should skip any foods. Drinking lots of fluids and eating foods such as fruits and vegetables that are high in fiber can also help. Remember, do not take laxatives unless your surgeon has prescribed them.  · Drinking alcohol and taking pain medicine can cause dizziness and slow your breathing. It can even be deadly. Do not drink alcohol while taking pain medicine.  · Pain medicine can make you react more slowly to things. Do not drive or run machinery while taking pain medicine.  Your health care provider may tell you to take acetaminophen to help ease your pain. Ask him or her how much you are supposed to take each day. Acetaminophen or other pain relievers may interact with your prescription medicines or other over-the-counter (OTC) drugs. Some prescription medicines have acetaminophen and other ingredients. Using both prescription and OTC acetaminophen for pain can cause you to overdose. Read the labels on your OTC medicines with care. This will help you to clearly know the list of ingredients, how much to take, and any warnings. It may also help you not take too much acetaminophen. If you have questions or do not understand the information, ask your pharmacist or health care provider to explain it to you before you take the OTC medicine.  Managing nausea  Some people have an upset stomach after surgery. This is often because of anesthesia, pain, or pain medicine, or  the stress of surgery. These tips will help you handle nausea and eat healthy foods as you get better. If you were on a special food plan before surgery, ask your doctor if you should follow it while you get better. These tips may help:  · Do not push yourself to eat. Your body will tell you when to eat and how much.  · Start off with clear liquids and soup. They are easier to digest.  · Next try semi-solid foods, such as mashed potatoes, applesauce, and gelatin, as you feel ready.  · Slowly move to solid foods. Dont eat fatty, rich, or spicy foods at first.  · Do not force yourself to have 3 large meals a day. Instead eat smaller amounts more often.  · Take pain medicines with a small amount of solid food, such as crackers or toast, to avoid nausea.     Call your surgeon if  · You still have pain an hour after taking medicine. The medicine may not be strong enough.  · You feel too sleepy, dizzy, or groggy. The medicine may be too strong.  · You have side effects like nausea, vomiting, or skin changes, such as rash, itching, or hives.       If you have obstructive sleep apnea  You were given anesthesia medicine during surgery to keep you comfortable and free of pain. After surgery, you may have more apnea spells because of this medicine and other medicines you were given. The spells may last longer than usual.   At home:  · Keep using the continuous positive airway pressure (CPAP) device when you sleep. Unless your health care provider tells you not to, use it when you sleep, day or night. CPAP is a common device used to treat obstructive sleep apnea.  · Talk with your provider before taking any pain medicine, muscle relaxants, or sedatives. Your provider will tell you about the possible dangers of taking these medicines.  © 0465-1329 [employer] [address] [place] [address], [place] [address]. All rights reserved. This information is not intended as a substitute for professional medical care. Always  follow your healthcare professional's instructions.    General Information:    1.  Do not drink alcoholic beverages including beer for 24 hours or as long as you are on pain medication..  2.  Do not drive a motor vehicle, operate machinery or power tools, or signs legal papers for 24 hours or as long as you are on pain medication.   3.  You may experience light-headedness, dizziness, and sleepiness following surgery. Please do not stay alone. A responsible adult should be with you for this 24 hour period.  4.  Go home and rest.    5. Progress slowly to a normal diet unless instructed.  Otherwise, begin with liquids such as soft drinks, then soup and crackers working up to solid foods. Drink plenty of nonalcoholic fluids.  6.  Certain anesthetics and pain medications produce nausea and vomiting in certain       individuals. If nausea becomes a problem at home, call you doctor.    7. A nurse will be calling you sometime after surgery. Do not be alarmed. This is our way of finding out how you are doing.    8. Several times every hour while you are awake, take 2-3 deep breaths and cough. If you had stomach surgery hold a pillow or rolled towel firmly against your stomach before you cough. This will help with any pain the cough might cause.  9. Several times every hour while you are awake, pump and flex your feet 5-6 times and do foot circles. This will help prevent blood clots.    10.Call your doctor for severe pain, bleeding, fever, or signs or symptoms of infection (pain, swelling, redness, foul odor, drainage).      Discharge Instructions for Shoulder Arthroscopy  You had a shoulder arthroscopy. It is a surgical procedure that helps the healthcare provider diagnose and treat shoulder problems. These include instability, arthritis, and rotator cuff problems. Below are instructions to help you care for your shoulder when you are at home.  What to expect  After surgery, your joint may be swollen, painful, and stiff. The  joint will heal with time. But, recovery times vary depending on what was done. For example, with a shaved rotator cuff, you may be told to move your arm soon after surgery to prevent stiffness. But if the rotator cuff is repaired or treatment is for instability or arthritis, your healthcare provider may want you to limit movement of your arm for a period of time. Follow your healthcare providers instructions regarding arm movement.  Activity     You may be told to do daily pendulum swings to improve your joints flexibility. Use your torso to move your arm in a [place], first in one direction, then the other.   · Dont drive until your healthcare provider says its OK. And never drive while taking opioid pain medicine.  · Ask your healthcare provider when it is safe to do Pendulum exercises:    ¨ Hold on to the back of a chair, or lean on a tabletop with your healthy arm.  ¨ Do not actively move your arm with your shoulder muscles during this process. Instead, allow your arm to sway gently.    ¨ Use your torso to move your affected arm in a [place]. First do 20 circles in one direction. Then do 20 circles in the other direction.  ¨ Repeat the pendulum exercise every 2 hour(s). When you feel ready, increase the number of circles to 50 in each direction, every 2 hour(s).  · Bend your wrist and wiggle your fingers often to help blood flow.  Incision care  · Check your incision daily for redness, tenderness, or drainage.  · Wait 3 day(s) after your surgery to begin showering. Then shower as needed. Carefully wash your incision with soap and water. Gently pat it dry. Dont rub the incision, or apply creams or lotions to it.  · Dont soak in a bathtub, hot tub, or pool until your healthcare provider says its OK.  Other home care  · Take your temperature daily for 7 days after your surgery. Report a fever above 100.4º F (38º C) to your healthcare provider. Fever may be a sign of infection.  · Wear your sling or  immobilizer as directed by your healthcare provider.  · Use pain medicine, as needed and as directed. Don't wait until the pain is severe to start using the medicine.   · Use an ice pack or a bag of frozen peas--or something similar--wrapped in a thin towel on your shoulder to reduce swelling for the first 48 hours after surgery. Hold the ice pack. Leave the ice pack on for 20 minutes; then take it off for 20 minutes. Repeat as needed.  Follow-up  Make a follow-up appointment as directed by your healthcare provider.  When to seek medical attention  Call 911 right away if you have any of the following:  · Chest pain  · Shortness of breath  Otherwise, call your healthcare provider immediately if you have any of the following:  · Increasing shoulder pain or pain not relieved by medicine  · Pain or swelling in the arm on the side of your surgery  · Numbness, tingling, or blue-gray color of your arm or fingers on the side of your surgery  · Drainage or oozing, redness, or warmth at the incision  · Fever above 100.4º F (38º C) or shaking chills  · Nausea or vomiting   Date Last Reviewed: 11/16/2015  © 4778-6059 [employer]. [address] [place] [address]. All rights reserved. This information is not intended as a substitute for professional medical care. Always follow your healthcare professional's instructions.            Admission Information     Date & Time Provider Department CSN    3/2/2017  5:41 AM Ezkeiel Schaefer MD [facility] [id number]      Care Providers     Provider Role Specialty Primary office phone    Ezekiel Schaefer MD Attending Provider Sports Medicine [phone]    Ezekiel Schaefer MD Surgeon  Sports Medicine [phone]      Your Vitals Were     BP                   147/89           Recent Lab Values     No lab values to display.      Allergies as of 3/2/2017     No Known Allergies      Advance Directives     An advance directive is a document which, in  the event you are no longer able to make decisions for yourself, tells your healthcare team what kind of treatment you do or do not want to receive, or who you would like to make those decisions for you.  If you do not currently have an advance directive, [facility] encourages you to create one.  For more information call:  [phone]), [phone] ([phone]),  or log on to [email or web address]        Language Assistance Services     ATTENTION: Language assistance services are available, free of charge. Please call [phone].      ATENCIÓN: Si habla yeimy, tiene a espinoza disposición servicios gratuitos de asistencia lingüística. Llame al [phone].     CHÚ Ý: N?u b?n nói Ti?ng Vi?t, có các d?ch v? h? tr? ngôn ng? mi?n phí dành cho b?n. G?i s? [phone].        [facility] Sign-Up     Activating your [facility] account is as easy as 1-2-3!     1) Visit [email or web address], select Sign Up Now, enter this activation code and your date of birth, then select Next.  [email or web address]  Expires: 3/16/2017  4:12 PM      2) Create a username and password to use when you visit [facility] in the future and select a security question in case you lose your password and select Next.    3) Enter your e-mail address and click Sign Up!    Additional Information  If you have questions, please e-mail [email or web address] or call [phone] to talk to our [facility] staff. Remember, [facility] is NOT to be used for urgent needs. For medical emergencies, dial 911.          [facility] complies with applicable Federal civil rights laws and does not discriminate on the basis of race, color, national origin, age, disability, or sex.

## 2017-03-02 NOTE — OP NOTE
DATE OF PROCEDURE:  03/02/2017    PREOPERATIVE DIAGNOSES:  1.  Right shoulder SLAP tear.  2.  Right shoulder rotator cuff tendinitis.  3.  Acromioclavicular joint arthritis.    POSTOPERATIVE DIAGNOSES:  1.  Right shoulder rotator cuff tear.  2.  Right shoulder biceps tear.  3.  Acromioclavicular joint arthritis.    PROCEDURES:  1.  Right shoulder arthroscopic rotator cuff repair.  2.  Right shoulder arthroscopic distal clavicle resection.  3.  Right shoulder arthroscopic subacromial decompression.    SURGEON:  Ezekiel Schaefer M.D.    ASSISTANT:  Viji Abad.    ANESTHESIA:  General with an interscalene block.    HISTORY:  Mr. Garcia presented to our office with a complaint of shoulder pain   following an injury he sustained picking up a heavy object.  His imaging   studies and examination were consistent with an injury to his biceps insertion   along with some acromioclavicular arthritis and some tendinosis of the rotator   cuff.  We discussed different treatment options with him and he elected to   proceed with an arthroscopy of the shoulder.  We expressed to him that we would   take a look at the rotator cuff and the biceps end-to-side, appropriate   treatment for those and do a distal clavicle resection to remove the arthritis   in his AC joint.  The risks and benefits of surgical intervention including the   potential for incomplete pain relief and the need for further procedures were   explained to the patient who expressed that he understood and wished to proceed.    Informed consent was obtained.    PROCEDURE IN DETAIL:  After verifying informed consent and correct site, the   patient was brought back to the Operating Room, placed on the OR table in supine   position.  Preoperative IV antibiotics were administered and a timeout was   performed.  The patient was then turned onto his left side and supported using   the beanbag.  All bony prominences were well padded.  The right upper extremity   was then  prepped and draped in sterile fashion and suspended by the fishing pole   apparatus using 15 pounds of distraction.    We began by creating a posterior portal.  Upon entering the shoulder, we noticed   that the superior labrum was frayed and that the biceps tendon had actually   torn completely off of it and it was retracted out of the shoulder.  There was a   remnant of the biceps at the superior labrum.  There was also fraying of the   anterior labrum.  Most notably, there was fraying of the rotator cuff at the   supraspinatus insertion where the MRI had shown tendinosis.  The cartilage of   the glenoid and humeral head appeared to be in good condition.  The   subscapularis muscle was in normal condition with no evidence of tear.  We then   created an anterior portal and probed the shoulder.  Once again, we found that   there was a stump remaining of the biceps insertion on the superior labrum.    Using a shaver, we debrided this down.  We also cleaned up the anterior labrum.    Once we cleaned up the labral tissue, we then turned our attention to the cuff.    When we put our shaver on the cuff, we immediately started to debride a   significant portion of it.  As we continued to debride, we took down well and   excess of 50% of the tendon and was left with a small area of tissue that was   all remaining of the rotator cuff.  We then marked this intraarticularly and   exited the intra-articular portion of the shoulder.    Upon entering the subacromial space, we had to do a complete subacromial   decompression, so that we could visualize our rotator cuff and our pathology.    Once we had done our decompression, we were able locate our marker.  Using a   shaver at the site of the marker, we started to debride and immediately felt   through the remaining few fibers of the superior rotator cuff and into the   intra-articular portion of the shoulder.  Our marker was then removed and we did   a complete debridement of  the cuff.  We had a cuff tear that was approximately   2 cm from anterior to posterior with a 1 cm retraction from lateral to medial.    We did a complete debridement of the edges of the rotator cuff along with a   debridement and decortication of the greater tuberosity.  At this point, we   began our rotator cuff repair by inserting a Mitek 5.5 triple-loaded Healix   anchor into the greater tuberosity.  The sutures were passed through the rotator   cuff and tied down, giving us a medial row repair.  We then took the tails well   through the sutures and crossed them and inserted them into a Mitek Versalok   anchor in the lateral cortex of the humerus, thereby giving us a suture bridge   type construct.  With the rotator cuff repaired, we then turned our attention to   the acromioclavicular joint.  All soft tissue from the area of the joint was   debrided.  We then took off some undersurface spurs from the acromial side of   the AC joint.  We then used the lisa to open up the space between the acromion   and clavicle.  Once we had this debrided down, we then took our final pictures   of the distal clavicle resection.  We then exited the shoulder.  The portals   were closed using 3-0 nylon.  Sterile dressing was applied along with a Velpeau   sling and a PolarCare.  The patient was then awoken from anesthesia, extubated,   and brought to the PACU in good condition.    TOTAL TOURNIQUET TIME:  None.    DRAINS:  None.    SPECIMENS:  None.    COMPLICATIONS:  None.    ESTIMATED BLOOD LOSS:  Minimal.    POSTOPERATIVE PLAN:  The patient will be discharged home later this morning and   follow up with us in clinic.  He had been given complete postop instructions and   pain medication.      HUNTER  dd: 03/02/2017 10:24:59 (CST)  td: 03/02/2017 12:32:26 (CST)  Doc ID   #[id number]  Job ID #[id number]    CC:

## 2017-03-02 NOTE — OR NURSING
Discharged home per wheelchair per personal vehicle with father.  aao x 4.  No comlplaints voiced.

## 2017-03-02 NOTE — ANESTHESIA PREPROCEDURE EVALUATION
03/02/2017  José Miguel Garcia is a 51 y.o., male.    [facility] Anesthesia Evaluation    I have reviewed the Patient Summary Reports.    I have reviewed the Nursing Notes.      Review of Systems  Anesthesia Hx:  No problems with previous Anesthesia Denies Hx of Anesthetic complications    Social:  Non-Smoker    Cardiovascular:  Cardiovascular Normal     Pulmonary:   Sleep Apnea, CPAP    Renal/:  Renal/ Normal     Hepatic/GI:  Hepatic/GI Normal    Neurological:  Neurology Normal    Endocrine:  Endocrine Normal        Physical Exam  General:  Obesity    Airway/Jaw/Neck:  Airway Findings: Mouth Opening: Normal Tongue: Normal  General Airway Assessment: Adult, Possible difficult intubation  Mallampati: IV  TM Distance: Normal, at least 6 cm  Jaw/Neck Findings:  Neck ROM: Normal ROM  Neck Findings:  Girth Increased      Dental:  Dental Findings: In tact   Chest/Lungs:  Chest/Lungs Clear    Heart/Vascular:  Heart Findings: Normal            Anesthesia Plan  Type of Anesthesia, risks & benefits discussed:  Anesthesia Type:  general  Patient's Preference:   Intra-op Monitoring Plan:   Intra-op Monitoring Plan Comments:   Post Op Pain Control Plan:   Post Op Pain Control Plan Comments:   Induction:   IV  Beta Blocker:  Patient is not currently on a Beta-Blocker (No further documentation required).       Informed Consent: Patient understands risks and agrees with Anesthesia plan.  Questions answered. Anesthesia consent signed with patient.  ASA Score: 3     Day of Surgery Review of History & Physical:    H&P update referred to the surgeon.         Ready For Surgery From Anesthesia Perspective.

## 2017-03-02 NOTE — DISCHARGE INSTRUCTIONS
"We hope your stay was comfortable as you heal now, mend and rest.    For we have enjoyed taking care of you by giving your our best.    And as you get better, by regaining your health and strength;   We count it as a privilege to have served you and hope your time at [facility] was well spent.      Thank  You!!!Discharge Instructions: After Your Surgery/Procedure  Youve just had surgery. During surgery you were given medicine called anesthesia to keep you relaxed and free of pain. After surgery you may have some pain or nausea. This is common. Here are some tips for feeling better and getting well after surgery.     Stay on schedule with your medication.   Going home  Your doctor or nurse will show you how to take care of yourself when you go home. He or she will also answer your questions. Have an adult family member or friend drive you home.      For your safety we recommend these precaution for the first 24 hours after your procedure:  · Do not drive or use heavy equipment.  · Do not make important decisions or sign legal papers.  · Do not drink alcohol.  · Have someone stay with you, if needed. He or she can watch for problems and help keep you safe.  · Your concentration, balance, coordination, and judgement may be impaired for many hours after anesthesia.  Use caution when ambulating or standing up.     · You may feel weak and "washed out" after anesthesia and surgery.      Subtle residual effects of general anesthesia or sedation with regional / local anesthesia can last more than 24 hours.  Rest for the remainder of the day or longer if your Doctor/Surgeon has advised you to do so.  Although you may feel normal within the first 24 hours, your reflexes and mental ability may be impaired without you realizing it.  You may feel dizzy, lightheaded or sleepy for 24 hours or longer.      Be sure to go to all follow-up visits with your doctor. And rest after your surgery for as long as your doctor tells you " to.  Coping with pain  If you have pain after surgery, pain medicine will help you feel better. Take it as told, before pain becomes severe. Also, ask your doctor or pharmacist about other ways to control pain. This might be with heat, ice, or relaxation. And follow any other instructions your surgeon or nurse gives you.  Tips for taking pain medicine  To get the best relief possible, remember these points:  · Pain medicines can upset your stomach. Taking them with a little food may help.  · Most pain relievers taken by mouth need at least 20 to 30 minutes to start to work.  · Taking medicine on a schedule can help you remember to take it. Try to time your medicine so that you can take it before starting an activity. This might be before you get dressed, go for a walk, or sit down for dinner.  · Constipation is a common side effect of pain medicines. Call your doctor before taking any medicines such as laxatives or stool softeners to help ease constipation. Also ask if you should skip any foods. Drinking lots of fluids and eating foods such as fruits and vegetables that are high in fiber can also help. Remember, do not take laxatives unless your surgeon has prescribed them.  · Drinking alcohol and taking pain medicine can cause dizziness and slow your breathing. It can even be deadly. Do not drink alcohol while taking pain medicine.  · Pain medicine can make you react more slowly to things. Do not drive or run machinery while taking pain medicine.  Your health care provider may tell you to take acetaminophen to help ease your pain. Ask him or her how much you are supposed to take each day. Acetaminophen or other pain relievers may interact with your prescription medicines or other over-the-counter (OTC) drugs. Some prescription medicines have acetaminophen and other ingredients. Using both prescription and OTC acetaminophen for pain can cause you to overdose. Read the labels on your OTC medicines with care. This  will help you to clearly know the list of ingredients, how much to take, and any warnings. It may also help you not take too much acetaminophen. If you have questions or do not understand the information, ask your pharmacist or health care provider to explain it to you before you take the OTC medicine.  Managing nausea  Some people have an upset stomach after surgery. This is often because of anesthesia, pain, or pain medicine, or the stress of surgery. These tips will help you handle nausea and eat healthy foods as you get better. If you were on a special food plan before surgery, ask your doctor if you should follow it while you get better. These tips may help:  · Do not push yourself to eat. Your body will tell you when to eat and how much.  · Start off with clear liquids and soup. They are easier to digest.  · Next try semi-solid foods, such as mashed potatoes, applesauce, and gelatin, as you feel ready.  · Slowly move to solid foods. Dont eat fatty, rich, or spicy foods at first.  · Do not force yourself to have 3 large meals a day. Instead eat smaller amounts more often.  · Take pain medicines with a small amount of solid food, such as crackers or toast, to avoid nausea.     Call your surgeon if  · You still have pain an hour after taking medicine. The medicine may not be strong enough.  · You feel too sleepy, dizzy, or groggy. The medicine may be too strong.  · You have side effects like nausea, vomiting, or skin changes, such as rash, itching, or hives.       If you have obstructive sleep apnea  You were given anesthesia medicine during surgery to keep you comfortable and free of pain. After surgery, you may have more apnea spells because of this medicine and other medicines you were given. The spells may last longer than usual.   At home:  · Keep using the continuous positive airway pressure (CPAP) device when you sleep. Unless your health care provider tells you not to, use it when you sleep, day or  night. CPAP is a common device used to treat obstructive sleep apnea.  · Talk with your provider before taking any pain medicine, muscle relaxants, or sedatives. Your provider will tell you about the possible dangers of taking these medicines.  © 6346-6122 [employer]. [address] [place] [address]. All rights reserved. This information is not intended as a substitute for professional medical care. Always follow your healthcare professional's instructions.    General Information:    1.  Do not drink alcoholic beverages including beer for 24 hours or as long as you are on pain medication..  2.  Do not drive a motor vehicle, operate machinery or power tools, or signs legal papers for 24 hours or as long as you are on pain medication.   3.  You may experience light-headedness, dizziness, and sleepiness following surgery. Please do not stay alone. A responsible adult should be with you for this 24 hour period.  4.  Go home and rest.    5. Progress slowly to a normal diet unless instructed.  Otherwise, begin with liquids such as soft drinks, then soup and crackers working up to solid foods. Drink plenty of nonalcoholic fluids.  6.  Certain anesthetics and pain medications produce nausea and vomiting in certain       individuals. If nausea becomes a problem at home, call you doctor.    7. A nurse will be calling you sometime after surgery. Do not be alarmed. This is our way of finding out how you are doing.    8. Several times every hour while you are awake, take 2-3 deep breaths and cough. If you had stomach surgery hold a pillow or rolled towel firmly against your stomach before you cough. This will help with any pain the cough might cause.  9. Several times every hour while you are awake, pump and flex your feet 5-6 times and do foot circles. This will help prevent blood clots.    10.Call your doctor for severe pain, bleeding, fever, or signs or symptoms of infection (pain, swelling, redness,  foul odor, drainage).      Discharge Instructions for Shoulder Arthroscopy  You had a shoulder arthroscopy. It is a surgical procedure that helps the healthcare provider diagnose and treat shoulder problems. These include instability, arthritis, and rotator cuff problems. Below are instructions to help you care for your shoulder when you are at home.  What to expect  After surgery, your joint may be swollen, painful, and stiff. The joint will heal with time. But, recovery times vary depending on what was done. For example, with a shaved rotator cuff, you may be told to move your arm soon after surgery to prevent stiffness. But if the rotator cuff is repaired or treatment is for instability or arthritis, your healthcare provider may want you to limit movement of your arm for a period of time. Follow your healthcare providers instructions regarding arm movement.  Activity     You may be told to do daily pendulum swings to improve your joints flexibility. Use your torso to move your arm in a Solomon, first in one direction, then the other.   · Dont drive until your healthcare provider says its OK. And never drive while taking opioid pain medicine.  · Ask your healthcare provider when it is safe to do Pendulum exercises:    ¨ Hold on to the back of a chair, or lean on a tabletop with your healthy arm.  ¨ Do not actively move your arm with your shoulder muscles during this process. Instead, allow your arm to sway gently.    ¨ Use your torso to move your affected arm in a Solomon. First do 20 circles in one direction. Then do 20 circles in the other direction.  ¨ Repeat the pendulum exercise every 2 hour(s). When you feel ready, increase the number of circles to 50 in each direction, every 2 hour(s).  · Bend your wrist and wiggle your fingers often to help blood flow.  Incision care  · Check your incision daily for redness, tenderness, or drainage.  · Wait 3 day(s) after your surgery to begin showering. Then shower as  needed. Carefully wash your incision with soap and water. Gently pat it dry. Dont rub the incision, or apply creams or lotions to it.  · Dont soak in a bathtub, hot tub, or pool until your healthcare provider says its OK.  Other home care  · Take your temperature daily for 7 days after your surgery. Report a fever above 100.4º F (38º C) to your healthcare provider. Fever may be a sign of infection.  · Wear your sling or immobilizer as directed by your healthcare provider.  · Use pain medicine, as needed and as directed. Don't wait until the pain is severe to start using the medicine.   · Use an ice pack or a bag of frozen peas--or something similar--wrapped in a thin towel on your shoulder to reduce swelling for the first 48 hours after surgery. Hold the ice pack. Leave the ice pack on for 20 minutes; then take it off for 20 minutes. Repeat as needed.  Follow-up  Make a follow-up appointment as directed by your healthcare provider.  When to seek medical attention  Call 911 right away if you have any of the following:  · Chest pain  · Shortness of breath  Otherwise, call your healthcare provider immediately if you have any of the following:  · Increasing shoulder pain or pain not relieved by medicine  · Pain or swelling in the arm on the side of your surgery  · Numbness, tingling, or blue-gray color of your arm or fingers on the side of your surgery  · Drainage or oozing, redness, or warmth at the incision  · Fever above 100.4º F (38º C) or shaking chills  · Nausea or vomiting   Date Last Reviewed: 11/16/2015  © 7285-2482 [employer]. [address] [place] [address]. All rights reserved. This information is not intended as a substitute for professional medical care. Always follow your healthcare professional's instructions.

## 2017-03-02 NOTE — PLAN OF CARE
Pt is drowsy and will wake up intermittently on his own.  He denies pain   No nausea, SPO2 100 % on room air  Vital signs stable

## 2017-03-02 NOTE — PLAN OF CARE
Admit to day surgery unit warm blankets given and assessment complete.Surgical consent verified and initialed  by patient today.

## 2017-03-02 NOTE — ANESTHESIA PROCEDURE NOTES
Peripheral    Patient location during procedure: pre-op   Block not for primary anesthetic.  Reason for block: at surgeon's request and post-op pain management   Post-op Pain Location: shoulder right  Start time: 3/2/2017 6:59 AM  Timeout: 3/2/2017 6:58 AM   End time: 3/2/2017 7:05 AM  Surgery related to: arthroscopic repair shoulder right  Staffing  Anesthesiologist: THOMAS HORN  Performed by: anesthesiologist   Preanesthetic Checklist  Completed: patient identified, site marked, surgical consent, pre-op evaluation, timeout performed, IV checked, risks and benefits discussed and monitors and equipment checked  Peripheral Block  Patient position: supine  Prep: ChloraPrep  Patient monitoring: continuous pulse ox, frequent blood pressure checks and cardiac monitor  Block type: interscalene  Laterality: right  Injection technique: single shot  Needle  Needle type: Short-bevel   Needle gauge: 22 G  Needle length: 2 in  Needle localization: ultrasound guidance   -ultrasound image captured on disc.  Assessment  Injection assessment: negative aspiration, negative parasthesia and local visualized surrounding nerve  Paresthesia pain: none  Heart rate change: no  Slow fractionated injection: yes  Medications: 0.5 bupivacaine  Epinephrine added: 2.5 mcg/mL (1/400,000)

## 2017-03-15 ENCOUNTER — OFFICE VISIT (OUTPATIENT)
Dept: ORTHOPEDICS | Facility: CLINIC | Age: 52
End: 2017-03-15
Payer: COMMERCIAL

## 2017-03-15 VITALS
HEIGHT: 67 IN | DIASTOLIC BLOOD PRESSURE: 100 MMHG | WEIGHT: 245 LBS | BODY MASS INDEX: 38.45 KG/M2 | SYSTOLIC BLOOD PRESSURE: 174 MMHG | HEART RATE: 70 BPM

## 2017-03-15 DIAGNOSIS — Z98.890 S/P ROTATOR CUFF REPAIR: Primary | ICD-10-CM

## 2017-03-15 PROCEDURE — 99024 POSTOP FOLLOW-UP VISIT: CPT | Mod: S$GLB,,, | Performed by: ORTHOPAEDIC SURGERY

## 2017-03-15 RX ORDER — OXYCODONE AND ACETAMINOPHEN 10; 325 MG/1; MG/1
1 TABLET ORAL EVERY 6 HOURS PRN
Qty: 60 TABLET | Refills: 0 | Status: SHIPPED | OUTPATIENT
Start: 2017-03-15 | End: 2017-04-04 | Stop reason: SDUPTHER

## 2017-03-15 RX ORDER — IBUPROFEN 600 MG/1
600 TABLET ORAL 4 TIMES DAILY
Qty: 60 TABLET | Refills: 0 | Status: SHIPPED | OUTPATIENT
Start: 2017-03-15 | End: 2017-05-15

## 2017-03-15 NOTE — PROGRESS NOTES
Past Medical History:   Diagnosis Date    Arthritis     DVT (deep venous thrombosis) 2011    Approx. 2011, after having a bunion removed. pt was on warfarin for approx. 6 weeks. unsure of time    Sleep apnea     USES CPAP    Wears glasses        Past Surgical History:   Procedure Laterality Date    BUNIONECTOMY Left 2006    Pt developed a blood clot in leg, after having bunion removed. Had to do coumadin for approx. 6 weeks.    BUNIONECTOMY Left 2010    complicated by dvt    COLONOSCOPY N/A 11/17/2016    Procedure: COLONOSCOPY;  Surgeon: Keegan Vo MD;  Location: [facility];  Service: Endoscopy;  Laterality: N/A;    KNEE SURGERY      Patient does not recall which side    VASECTOMY Bilateral 2011       Current Outpatient Prescriptions   Medication Sig    ibuprofen (ADVIL,MOTRIN) 600 MG tablet Take 1 tablet (600 mg total) by mouth 4 (four) times daily.    oxycodone-acetaminophen (PERCOCET)  mg per tablet Take 1 tablet by mouth every 6 (six) hours as needed for Pain.     No current facility-administered medications for this visit.        Review of patient's allergies indicates:  No Known Allergies    Family History   Problem Relation Age of Onset    Heart block Mother     Kidney disease Mother     Cancer Paternal Aunt        Social History     Social History    Marital status: Single     Spouse name: N/A    Number of children: N/A    Years of education: N/A     Occupational History    Not on file.     Social History Main Topics    Smoking status: Never Smoker    Smokeless tobacco: Not on file    Alcohol use 0.6 oz/week     1 Cans of beer per week    Drug use: No    Sexual activity: Not on file     Other Topics Concern    Not on file     Social History Narrative       Chief Complaint:   Chief Complaint   Patient presents with    Right Shoulder - Post-op Evaluation       Consulting Physician: No ref. provider found    History of present illness: This is a 51 y.o. year old male  following up for right cuff repair 3-2-17. Pain up to 8/10. Using meds.      Review of Systems:    Constitution: Denies chills, fever, and sweats.    HENT: Denies headaches or blurry vision.    Cardiovascular: Denies chest pain or irregular heart beat.    Respiratory: Denies cough or shortness of breath.    Gastrointestinal: Denies abdominal pain, nausea, or vomiting.    Musculoskeletal:  Denies muscle cramps.    Neurological: Denies dizziness or focal weakness.    Psychiatric/Behavioral: Normal mental status.    Hematologic/Lymphatic: Denies bleeding problem or easy bruising/bleeding.    Skin: Denies rash or suspicious lesions.      Examination:    Vital Signs:    Vitals:    03/15/17 1020   BP: (!) 174/100   Pulse: 70       Body mass index is 38.37 kg/(m^2).    This a well-developed, well nourished patient in no acute distress.    Alert and oriented and cooperative to examination.       Physical Exam: right shoulder    Inspection/Observation   Swelling:   mild  Erythema:   none  Bruising:   mild  Wounds:   Clean and dry  Drainage:  None    Range of Motion   Limited    Muscle Strength   Limited    Other   Sensation:  normal  Pulse:    palpable    Imaging:      Assessment: S/P rotator cuff repair        Plan:  We will start PT and continue velpeau. Back in 4 weeks. Unable to use right arm until further notice.      DISCLAIMER: This note may have been dictated using voice recognition software and may contain grammatical errors. Report sent to referring provider as required.

## 2017-04-04 RX ORDER — OXYCODONE AND ACETAMINOPHEN 10; 325 MG/1; MG/1
1 TABLET ORAL EVERY 6 HOURS PRN
Qty: 60 TABLET | Refills: 0 | Status: SHIPPED | OUTPATIENT
Start: 2017-04-04 | End: 2017-05-09

## 2017-04-04 NOTE — TELEPHONE ENCOUNTER
----- Message from Susana Blum sent at 4/4/2017  2:42 PM CDT -----  Patient requesting refill of pain medication(did not have name of medication on him/uses [facility] in [place]/please order or if any questions call back at [phone].

## 2017-04-10 DIAGNOSIS — Z98.890 S/P ROTATOR CUFF REPAIR: Primary | ICD-10-CM

## 2017-04-20 DIAGNOSIS — Z98.890 H/O REPAIR OF RIGHT ROTATOR CUFF: Primary | ICD-10-CM

## 2017-04-24 ENCOUNTER — OFFICE VISIT (OUTPATIENT)
Dept: ORTHOPEDICS | Facility: CLINIC | Age: 52
End: 2017-04-24
Payer: COMMERCIAL

## 2017-04-24 VITALS
HEART RATE: 70 BPM | SYSTOLIC BLOOD PRESSURE: 142 MMHG | BODY MASS INDEX: 38.45 KG/M2 | HEIGHT: 67 IN | DIASTOLIC BLOOD PRESSURE: 91 MMHG | WEIGHT: 245 LBS

## 2017-04-24 DIAGNOSIS — Z98.890 S/P ROTATOR CUFF REPAIR: Primary | ICD-10-CM

## 2017-04-24 PROCEDURE — 99024 POSTOP FOLLOW-UP VISIT: CPT | Mod: S$GLB,,, | Performed by: ORTHOPAEDIC SURGERY

## 2017-04-24 NOTE — PROGRESS NOTES
Past Medical History:   Diagnosis Date    Arthritis     DVT (deep venous thrombosis) 2011    Approx. 2011, after having a bunion removed. pt was on warfarin for approx. 6 weeks. unsure of time    Sleep apnea     USES CPAP    Wears glasses        Past Surgical History:   Procedure Laterality Date    BUNIONECTOMY Left 2006    Pt developed a blood clot in leg, after having bunion removed. Had to do coumadin for approx. 6 weeks.    BUNIONECTOMY Left 2010    complicated by dvt    COLONOSCOPY N/A 11/17/2016    Procedure: COLONOSCOPY;  Surgeon: Keegan Vo MD;  Location: [facility];  Service: Endoscopy;  Laterality: N/A;    KNEE SURGERY      Patient does not recall which side    VASECTOMY Bilateral 2011       Current Outpatient Prescriptions   Medication Sig    ibuprofen (ADVIL,MOTRIN) 600 MG tablet Take 1 tablet (600 mg total) by mouth 4 (four) times daily.    oxycodone-acetaminophen (PERCOCET)  mg per tablet Take 1 tablet by mouth every 6 (six) hours as needed for Pain.     No current facility-administered medications for this visit.        Review of patient's allergies indicates:  No Known Allergies    Family History   Problem Relation Age of Onset    Heart block Mother     Kidney disease Mother     Cancer Paternal Aunt        Social History     Social History    Marital status: Single     Spouse name: N/A    Number of children: N/A    Years of education: N/A     Occupational History    Not on file.     Social History Main Topics    Smoking status: Never Smoker    Smokeless tobacco: Not on file    Alcohol use 0.6 oz/week     1 Cans of beer per week    Drug use: No    Sexual activity: Not on file     Other Topics Concern    Not on file     Social History Narrative       Chief Complaint:   Chief Complaint   Patient presents with    Post-op Evaluation     Right RCR 3/2/2017       Consulting Physician: No ref. provider found    History of present illness: This is a 51 y.o. year old male  following up for right cuff repair 3-2-17. Pain up to 8/10. Using meds. In PT.      Review of Systems:    Constitution: Denies chills, fever, and sweats.    HENT: Denies headaches or blurry vision.    Cardiovascular: Denies chest pain or irregular heart beat.    Respiratory: Denies cough or shortness of breath.    Gastrointestinal: Denies abdominal pain, nausea, or vomiting.    Musculoskeletal:  Denies muscle cramps.    Neurological: Denies dizziness or focal weakness.    Psychiatric/Behavioral: Normal mental status.    Hematologic/Lymphatic: Denies bleeding problem or easy bruising/bleeding.    Skin: Denies rash or suspicious lesions.      Examination:    Vital Signs:    Vitals:    04/24/17 0900   BP: (!) 142/91   Pulse: 70       Body mass index is 38.37 kg/(m^2).    This a well-developed, well nourished patient in no acute distress.    Alert and oriented and cooperative to examination.       Physical Exam: right shoulder    Inspection/Observation   Swelling:   mild  Erythema:   none  Bruising:   mild  Wounds:   Clean and dry  Drainage:  None    Range of Motion   Limited    Muscle Strength   Limited    Other   Sensation:  normal  Pulse:    palpable    Imaging: X-rays ordered and reviewed today of the right shoulder show normal postoperative placement of the anchors.     Assessment: S/P rotator cuff repair        Plan:  We will continue PT. Back in 6 weeks. Unable to use right arm for work until further notice. Norco Rx given.      DISCLAIMER: This note may have been dictated using voice recognition software and may contain grammatical errors. Report sent to referring provider as required.

## 2017-05-08 ENCOUNTER — OFFICE VISIT (OUTPATIENT)
Dept: ORTHOPEDICS | Facility: CLINIC | Age: 52
End: 2017-05-08
Payer: COMMERCIAL

## 2017-05-08 VITALS
SYSTOLIC BLOOD PRESSURE: 131 MMHG | DIASTOLIC BLOOD PRESSURE: 90 MMHG | HEIGHT: 67 IN | HEART RATE: 101 BPM | WEIGHT: 245 LBS | BODY MASS INDEX: 38.45 KG/M2

## 2017-05-08 DIAGNOSIS — Z98.890 S/P ROTATOR CUFF REPAIR: Primary | ICD-10-CM

## 2017-05-08 PROCEDURE — 99024 POSTOP FOLLOW-UP VISIT: CPT | Mod: S$GLB,,, | Performed by: ORTHOPAEDIC SURGERY

## 2017-05-08 RX ORDER — OXYCODONE AND ACETAMINOPHEN 5; 325 MG/1; MG/1
1-2 TABLET ORAL EVERY 6 HOURS PRN
Qty: 40 TABLET | Refills: 0 | Status: SHIPPED | OUTPATIENT
Start: 2017-05-08 | End: 2017-05-15
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
## 2017-05-09 PROBLEM — R42 DIZZINESS: Status: ACTIVE | Noted: 2017-05-09

## 2017-05-09 PROBLEM — R29.898 DECREASED ROM OF NECK: Status: ACTIVE | Noted: 2017-05-09

## 2017-05-09 PROBLEM — M54.50 ACUTE BILATERAL LOW BACK PAIN WITHOUT SCIATICA: Status: ACTIVE | Noted: 2017-05-09

## 2017-05-09 PROBLEM — M25.462 PAIN AND SWELLING OF LEFT KNEE: Status: ACTIVE | Noted: 2017-05-09

## 2017-05-09 PROBLEM — M23.8X9 KNEE CREPITUS: Status: ACTIVE | Noted: 2017-05-09

## 2017-05-09 PROBLEM — M25.562 PAIN AND SWELLING OF LEFT KNEE: Status: ACTIVE | Noted: 2017-05-09

## 2017-05-09 PROBLEM — G44.319 ACUTE POST-TRAUMATIC HEADACHE, NOT INTRACTABLE: Status: ACTIVE | Noted: 2017-05-09
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
## 2017-05-09 PROBLEM — M54.2 NECK PAIN: Status: ACTIVE | Noted: 2017-05-09

## 2017-05-15 PROBLEM — M62.838 MUSCLE SPASM: Status: ACTIVE | Noted: 2017-05-15

## 2017-06-05 PROBLEM — M25.512 ACUTE PAIN OF LEFT SHOULDER: Status: ACTIVE | Noted: 2017-06-05

## 2017-06-05 PROBLEM — R42 DIZZINESS: Status: RESOLVED | Noted: 2017-05-09 | Resolved: 2017-06-05

## 2017-08-07 PROBLEM — E11.65 TYPE 2 DIABETES MELLITUS WITH HYPERGLYCEMIA, WITHOUT LONG-TERM CURRENT USE OF INSULIN: Status: ACTIVE | Noted: 2017-08-07

## 2017-08-07 PROBLEM — R73.09 HEMOGLOBIN A1C LESS THAN 7.0%: Status: ACTIVE | Noted: 2017-08-07

## 2017-08-21 ENCOUNTER — OFFICE VISIT (OUTPATIENT)
Dept: ORTHOPEDICS | Facility: CLINIC | Age: 52
End: 2017-08-21
Payer: COMMERCIAL

## 2017-08-21 VITALS
SYSTOLIC BLOOD PRESSURE: 154 MMHG | HEART RATE: 74 BPM | DIASTOLIC BLOOD PRESSURE: 103 MMHG | BODY MASS INDEX: 38.44 KG/M2 | WEIGHT: 244.94 LBS | HEIGHT: 67 IN

## 2017-08-21 DIAGNOSIS — Z98.890 S/P ROTATOR CUFF REPAIR: Primary | ICD-10-CM

## 2017-08-21 PROBLEM — M43.06 PARS DEFECT OF LUMBAR SPINE: Status: ACTIVE | Noted: 2017-08-21

## 2017-08-21 PROBLEM — M54.42 ACUTE BILATERAL LOW BACK PAIN WITH LEFT-SIDED SCIATICA: Status: ACTIVE | Noted: 2017-08-21

## 2017-08-21 PROBLEM — M25.462 PAIN AND SWELLING OF LEFT KNEE: Status: RESOLVED | Noted: 2017-05-09 | Resolved: 2017-08-21

## 2017-08-21 PROBLEM — M25.562 PAIN AND SWELLING OF LEFT KNEE: Status: RESOLVED | Noted: 2017-05-09 | Resolved: 2017-08-21

## 2017-08-21 PROCEDURE — 3008F BODY MASS INDEX DOCD: CPT | Mod: S$GLB,,, | Performed by: ORTHOPAEDIC SURGERY

## 2017-08-21 PROCEDURE — 99213 OFFICE O/P EST LOW 20 MIN: CPT | Mod: S$GLB,,, | Performed by: ORTHOPAEDIC SURGERY

## 2017-08-21 NOTE — PROGRESS NOTES
Past Medical History:   Diagnosis Date    Arthritis     DVT (deep venous thrombosis) 2011    Approx. 2011, after having a bunion removed. pt was on warfarin for approx. 6 weeks. unsure of time    Sleep apnea     USES CPAP    Wears glasses        Past Surgical History:   Procedure Laterality Date    BUNIONECTOMY Left 2006    Pt developed a blood clot in leg, after having bunion removed. Had to do coumadin for approx. 6 weeks.    BUNIONECTOMY Left 2010    complicated by dvt    COLONOSCOPY N/A 11/17/2016    Procedure: COLONOSCOPY;  Surgeon: Keegan Vo MD;  Location: [facility];  Service: Endoscopy;  Laterality: N/A;    KNEE SURGERY      Patient does not recall which side    VASECTOMY Bilateral 2011       Current Outpatient Prescriptions   Medication Sig    hydrocodone-acetaminophen 5-325mg (NORCO) 5-325 mg per tablet Take 1 tablet by mouth every 6 (six) hours as needed for Pain.    metformin (FORTAMET) 500 mg 24 hr tablet Take 1 tablet (500 mg total) by mouth daily with breakfast.     No current facility-administered medications for this visit.        Review of patient's allergies indicates:  No Known Allergies    Family History   Problem Relation Age of Onset    Heart block Mother     Kidney disease Mother     Cancer Paternal Aunt        Social History     Social History    Marital status: Single     Spouse name: N/A    Number of children: N/A    Years of education: N/A     Occupational History    Not on file.     Social History Main Topics    Smoking status: Never Smoker    Smokeless tobacco: Not on file    Alcohol use 0.6 oz/week     1 Cans of beer per week    Drug use: No    Sexual activity: Not on file     Other Topics Concern    Not on file     Social History Narrative    No narrative on file       Chief Complaint:   Chief Complaint   Patient presents with    Post-op Evaluation     S/P Right RCR 3/2/17       Consulting Physician: No ref. provider found    History of present  illness: This is a 52 y.o. year old male following up for right cuff repair 3-2-17.  Its his pain today at a 0 out of 10.  He has been doing therapy.    Review of Systems:    Constitution: Denies chills, fever, and sweats.    HENT: Denies headaches or blurry vision.    Cardiovascular: Denies chest pain or irregular heart beat.    Respiratory: Denies cough or shortness of breath.    Gastrointestinal: Denies abdominal pain, nausea, or vomiting.    Musculoskeletal:  Denies muscle cramps.    Neurological: Denies dizziness or focal weakness.    Psychiatric/Behavioral: Normal mental status.    Hematologic/Lymphatic: Denies bleeding problem or easy bruising/bleeding.    Skin: Denies rash or suspicious lesions.      Examination:    Vital Signs:    Vitals:    08/21/17 0904   BP: (!) 154/103   Pulse: 74       Body mass index is 38.36 kg/m².    This a well-developed, well nourished patient in no acute distress.    Alert and oriented and cooperative to examination.       Physical Exam: right shoulder    Inspection/Observation   Swelling:   none  Erythema:   none  Bruising:   none  Wounds:   healed  Drainage:  None    Range of Motion   Near full    Muscle Strength   4+    Other   Sensation:  normal  Pulse:    palpable    Imaging:      Assessment: S/P rotator cuff repair        Plan:  He is doing very well.  He has a little bit more strength and motion to get.  We'll keep him in physical therapy for another 6 weeks and see him back at that point in time.  We anticipate a lateral release then.    DISCLAIMER: This note may have been dictated using voice recognition software and may contain grammatical errors. Report sent to referring provider as required.

## 2017-09-05 PROBLEM — M46.1 SACROILIITIS: Status: ACTIVE | Noted: 2017-09-05

## 2017-09-05 PROBLEM — M54.16 LUMBAR RADICULOPATHY: Status: ACTIVE | Noted: 2017-09-05

## 2017-09-05 PROBLEM — M75.51 BILATERAL SHOULDER BURSITIS: Status: ACTIVE | Noted: 2017-09-05

## 2017-09-05 PROBLEM — M70.61 TROCHANTERIC BURSITIS OF BOTH HIPS: Status: ACTIVE | Noted: 2017-09-05

## 2017-09-05 PROBLEM — M47.812 CERVICAL SPONDYLOSIS: Status: ACTIVE | Noted: 2017-09-05

## 2017-09-05 PROBLEM — Z98.890 HX OF REPAIR OF RIGHT ROTATOR CUFF: Status: ACTIVE | Noted: 2017-09-05

## 2017-09-05 PROBLEM — M43.12 SPONDYLOLISTHESIS OF CERVICAL REGION: Status: ACTIVE | Noted: 2017-09-05

## 2017-09-05 PROBLEM — M70.62 TROCHANTERIC BURSITIS OF BOTH HIPS: Status: ACTIVE | Noted: 2017-09-05

## 2017-09-05 PROBLEM — M47.816 LUMBAR SPONDYLOSIS: Status: ACTIVE | Noted: 2017-09-05

## 2017-09-05 PROBLEM — M50.30 DDD (DEGENERATIVE DISC DISEASE), CERVICAL: Status: ACTIVE | Noted: 2017-09-05

## 2017-09-05 PROBLEM — M43.10 ANTEROLISTHESIS: Status: ACTIVE | Noted: 2017-09-05

## 2017-09-05 PROBLEM — M75.52 BILATERAL SHOULDER BURSITIS: Status: ACTIVE | Noted: 2017-09-05

## 2017-09-14 ENCOUNTER — ANESTHESIA EVENT (OUTPATIENT)
Dept: SURGERY | Facility: AMBULARY SURGERY CENTER | Age: 52
End: 2017-09-14
Payer: COMMERCIAL

## 2017-09-15 ENCOUNTER — SURGERY (OUTPATIENT)
Age: 52
End: 2017-09-15

## 2017-09-15 ENCOUNTER — HOSPITAL ENCOUNTER (OUTPATIENT)
Facility: AMBULARY SURGERY CENTER | Age: 52
Discharge: HOME | End: 2017-09-15
Attending: SPECIALIST | Admitting: SPECIALIST
Payer: COMMERCIAL

## 2017-09-15 ENCOUNTER — ANESTHESIA (OUTPATIENT)
Dept: SURGERY | Facility: AMBULARY SURGERY CENTER | Age: 52
End: 2017-09-15
Payer: COMMERCIAL

## 2017-09-15 DIAGNOSIS — M47.816 LUMBAR SPONDYLOSIS: ICD-10-CM

## 2017-09-15 DIAGNOSIS — M46.1 SACROILIITIS: ICD-10-CM

## 2017-09-15 DIAGNOSIS — M43.10 ANTEROLISTHESIS: ICD-10-CM

## 2017-09-15 DIAGNOSIS — M43.06 PARS DEFECT OF LUMBAR SPINE: Primary | ICD-10-CM

## 2017-09-15 DIAGNOSIS — M43.12 SPONDYLOLISTHESIS OF CERVICAL REGION: ICD-10-CM

## 2017-09-15 DIAGNOSIS — M54.16 LUMBAR RADICULOPATHY: ICD-10-CM

## 2017-09-15 LAB — POCT GLUCOSE: 116 MG/DL (ref 70–110)

## 2017-09-15 PROCEDURE — 62323 NJX INTERLAMINAR LMBR/SAC: CPT | Performed by: SPECIALIST

## 2017-09-15 PROCEDURE — D9220A PRA ANESTHESIA: Mod: CRNA,,, | Performed by: NURSE ANESTHETIST, CERTIFIED REGISTERED

## 2017-09-15 PROCEDURE — D9220A PRA ANESTHESIA: Mod: ANES,,, | Performed by: ANESTHESIOLOGY

## 2017-09-15 RX ORDER — LIDOCAINE HYDROCHLORIDE 10 MG/ML
1 INJECTION, SOLUTION EPIDURAL; INFILTRATION; INTRACAUDAL; PERINEURAL ONCE
Status: DISCONTINUED | OUTPATIENT
Start: 2017-09-15 | End: 2017-09-15 | Stop reason: HOSPADM

## 2017-09-15 RX ORDER — OXYCODONE AND ACETAMINOPHEN 5; 325 MG/1; MG/1
1 TABLET ORAL
Status: DISCONTINUED | OUTPATIENT
Start: 2017-09-15 | End: 2017-09-15 | Stop reason: HOSPADM

## 2017-09-15 RX ORDER — SODIUM CHLORIDE 0.9 % (FLUSH) 0.9 %
3 SYRINGE (ML) INJECTION
Status: DISCONTINUED | OUTPATIENT
Start: 2017-09-15 | End: 2017-09-15 | Stop reason: HOSPADM

## 2017-09-15 RX ORDER — PROPOFOL 10 MG/ML
VIAL (ML) INTRAVENOUS
Status: DISCONTINUED | OUTPATIENT
Start: 2017-09-15 | End: 2017-09-15

## 2017-09-15 RX ORDER — BUPIVACAINE HYDROCHLORIDE 2.5 MG/ML
INJECTION, SOLUTION EPIDURAL; INFILTRATION; INTRACAUDAL
Status: DISCONTINUED | OUTPATIENT
Start: 2017-09-15 | End: 2017-09-15 | Stop reason: HOSPADM

## 2017-09-15 RX ORDER — DEXAMETHASONE SODIUM PHOSPHATE 10 MG/ML
INJECTION INTRAMUSCULAR; INTRAVENOUS
Status: DISPENSED
Start: 2017-09-15 | End: 2017-09-16

## 2017-09-15 RX ORDER — ONDANSETRON 2 MG/ML
4 INJECTION INTRAMUSCULAR; INTRAVENOUS DAILY PRN
Status: DISCONTINUED | OUTPATIENT
Start: 2017-09-15 | End: 2017-09-15 | Stop reason: HOSPADM

## 2017-09-15 RX ORDER — SODIUM CHLORIDE 9 MG/ML
INJECTION, SOLUTION INTRAMUSCULAR; INTRAVENOUS; SUBCUTANEOUS
Status: DISCONTINUED | OUTPATIENT
Start: 2017-09-15 | End: 2017-09-15 | Stop reason: HOSPADM

## 2017-09-15 RX ORDER — SODIUM CHLORIDE, SODIUM LACTATE, POTASSIUM CHLORIDE, CALCIUM CHLORIDE 600; 310; 30; 20 MG/100ML; MG/100ML; MG/100ML; MG/100ML
125 INJECTION, SOLUTION INTRAVENOUS CONTINUOUS
Status: DISCONTINUED | OUTPATIENT
Start: 2017-09-15 | End: 2017-09-15 | Stop reason: HOSPADM

## 2017-09-15 RX ORDER — SODIUM CHLORIDE, SODIUM LACTATE, POTASSIUM CHLORIDE, CALCIUM CHLORIDE 600; 310; 30; 20 MG/100ML; MG/100ML; MG/100ML; MG/100ML
INJECTION, SOLUTION INTRAVENOUS CONTINUOUS
Status: DISCONTINUED | OUTPATIENT
Start: 2017-09-15 | End: 2017-09-15 | Stop reason: HOSPADM

## 2017-09-15 RX ORDER — PROPOFOL 10 MG/ML
INJECTION, EMULSION INTRAVENOUS
Status: DISCONTINUED
Start: 2017-09-15 | End: 2017-09-15 | Stop reason: HOSPADM

## 2017-09-15 RX ORDER — DEXAMETHASONE SODIUM PHOSPHATE 100 MG/10ML
INJECTION INTRAMUSCULAR; INTRAVENOUS
Status: DISCONTINUED | OUTPATIENT
Start: 2017-09-15 | End: 2017-09-15 | Stop reason: HOSPADM

## 2017-09-15 RX ORDER — LIDOCAINE HYDROCHLORIDE 10 MG/ML
1 INJECTION, SOLUTION EPIDURAL; INFILTRATION; INTRACAUDAL; PERINEURAL ONCE
Status: COMPLETED | OUTPATIENT
Start: 2017-09-15 | End: 2017-09-15

## 2017-09-15 RX ORDER — LIDOCAINE HCL/PF 100 MG/5ML
SYRINGE (ML) INTRAVENOUS
Status: DISCONTINUED | OUTPATIENT
Start: 2017-09-15 | End: 2017-09-15

## 2017-09-15 RX ORDER — LIDOCAINE HYDROCHLORIDE 20 MG/ML
INJECTION, SOLUTION EPIDURAL; INFILTRATION; INTRACAUDAL; PERINEURAL
Status: DISCONTINUED
Start: 2017-09-15 | End: 2017-09-15 | Stop reason: HOSPADM

## 2017-09-15 RX ORDER — BUPIVACAINE HYDROCHLORIDE 2.5 MG/ML
INJECTION, SOLUTION EPIDURAL; INFILTRATION; INTRACAUDAL
Status: DISPENSED
Start: 2017-09-15 | End: 2017-09-16

## 2017-09-15 RX ADMIN — SODIUM CHLORIDE 4.5 ML: 9 INJECTION, SOLUTION INTRAMUSCULAR; INTRAVENOUS; SUBCUTANEOUS at 09:09

## 2017-09-15 RX ADMIN — Medication 50 MG: at 09:09

## 2017-09-15 RX ADMIN — BUPIVACAINE HYDROCHLORIDE 4.5 ML: 2.5 INJECTION, SOLUTION EPIDURAL; INFILTRATION; INTRACAUDAL at 09:09

## 2017-09-15 RX ADMIN — SODIUM CHLORIDE, SODIUM LACTATE, POTASSIUM CHLORIDE, CALCIUM CHLORIDE: 600; 310; 30; 20 INJECTION, SOLUTION INTRAVENOUS at 08:09

## 2017-09-15 RX ADMIN — SODIUM CHLORIDE, SODIUM LACTATE, POTASSIUM CHLORIDE, CALCIUM CHLORIDE: 600; 310; 30; 20 INJECTION, SOLUTION INTRAVENOUS at 09:09

## 2017-09-15 RX ADMIN — DEXAMETHASONE SODIUM PHOSPHATE 20 MG: 100 INJECTION INTRAMUSCULAR; INTRAVENOUS at 09:09

## 2017-09-15 RX ADMIN — BUPIVACAINE HYDROCHLORIDE 5 ML: 2.5 INJECTION, SOLUTION EPIDURAL; INFILTRATION; INTRACAUDAL at 09:09

## 2017-09-15 RX ADMIN — LIDOCAINE HYDROCHLORIDE 10 MG: 10 INJECTION, SOLUTION EPIDURAL; INFILTRATION; INTRACAUDAL; PERINEURAL at 08:09

## 2017-09-15 NOTE — ANESTHESIA PREPROCEDURE EVALUATION
09/15/2017  José Miguel Garcia is a 52 y.o., male.    Pre-op Assessment    I have reviewed the Patient Summary Reports.     I have reviewed the Nursing Notes.      Review of Systems  Anesthesia Hx:  No problems with previous Anesthesia Denies Hx of Anesthetic complications    Social:  Former Smoker, Social Alcohol Use    Cardiovascular:  Cardiovascular Normal     Pulmonary:   Sleep Apnea, CPAP    Renal/:  Renal/ Normal     Hepatic/GI:  Hepatic/GI Normal    Musculoskeletal:   Arthritis   Spine Disorders:    Neurological:   Headaches   Chronic Pain Syndrome  Peripheral Neuropathy    Endocrine:   Diabetes        Physical Exam  General:  Obesity    Airway/Jaw/Neck:  Airway Findings: Mouth Opening: Normal Tongue: Normal  General Airway Assessment: Adult, Possible difficult intubation  Mallampati: IV  TM Distance: Normal, at least 6 cm  Jaw/Neck Findings:  Neck ROM: Normal ROM  Neck Findings:  Girth Increased      Dental:  Dental Findings: In tact   Chest/Lungs:  Chest/Lungs Clear    Heart/Vascular:  Heart Findings: Normal            Anesthesia Plan  Type of Anesthesia, risks & benefits discussed:  Anesthesia Type:  MAC  Patient's Preference:   Intra-op Monitoring Plan: standard ASA monitors  Intra-op Monitoring Plan Comments:   Post Op Pain Control Plan:   Post Op Pain Control Plan Comments:   Induction:   IV  Beta Blocker:  Patient is not currently on a Beta-Blocker (No further documentation required).       Informed Consent: Patient understands risks and agrees with Anesthesia plan.  Questions answered. Anesthesia consent signed with patient.  ASA Score: 3     Day of Surgery Review of History & Physical: I have interviewed and examined the patient. I have reviewed the patient's H&P dated:  There are no significant changes.  H&P update referred to the surgeon.         Ready For Surgery From Anesthesia  Perspective.

## 2017-09-15 NOTE — TRANSFER OF CARE
"Anesthesia Transfer of Care Note    Patient: José Miguel Garcia    Procedure(s) Performed: Procedure(s) (LRB):  INJECTION-STEROID-EPIDURAL-LUMBAR (N/A)    Patient location: PACU    Anesthesia Type: MAC    Transport from OR: Transported from OR on 2-3 L/min O2 by NC with adequate spontaneous ventilation    Post pain: adequate analgesia    Post assessment: no apparent anesthetic complications and tolerated procedure well    Post vital signs: stable    Level of consciousness: awake, alert and oriented    Nausea/Vomiting: no nausea/vomiting    Complications: none    Transfer of care protocol was followed      Last vitals:   Visit Vitals  BP (!) 178/72 (BP Location: Right arm, Patient Position: Lying)   Pulse 61   Temp 36.6 °C (97.9 °F) (Oral)   Resp 18   Ht 5' 7" (1.702 m)   Wt 108.9 kg (240 lb)   SpO2 95%   BMI 37.59 kg/m²     "

## 2017-09-15 NOTE — DISCHARGE SUMMARY
[facility]  Discharge Note  Short Stay    Admit Date: 9/15/2017    Discharge Date and Time: No discharge date for patient encounter.     Attending Physician: Froylan López Jr., MD     Discharge Provider: Froylan López Jr    Diagnoses:  Active Hospital Problems    Diagnosis  POA    *Lumbar radiculopathy [M54.16]  Yes      Resolved Hospital Problems    Diagnosis Date Resolved POA   No resolved problems to display.       Discharged Condition: good    Hospital Course: Patient was admitted for an outpatient procedure and tolerated the procedure well with no complications.    Final Diagnoses: Same as principal problem.    Disposition: Home or Self Care    Follow up/Patient Instructions:    Medications:  Reconciled Home Medications:   Current Discharge Medication List      CONTINUE these medications which have NOT CHANGED    Details   hydrocodone-acetaminophen 10-325mg (NORCO)  mg Tab Take 1 tablet by mouth every 6 to 8 hours as needed for Pain.  Qty: 60 tablet, Refills: 0      hydrocodone-acetaminophen 5-325mg (NORCO) 5-325 mg per tablet Take 1 tablet by mouth every 6 (six) hours as needed for Pain.  Qty: 30 tablet, Refills: 0    Associated Diagnoses: Acute bilateral low back pain with left-sided sciatica; Muscle spasm; Pars defect of lumbar spine; Spondylosis of lumbosacral region, unspecified spinal osteoarthritis complication status      meloxicam (MOBIC) 15 MG tablet Take 1 tablet (15 mg total) by mouth once daily.  Qty: 60 tablet, Refills: 0      metformin (FORTAMET) 500 mg 24 hr tablet Take 1 tablet (500 mg total) by mouth daily with breakfast.  Qty: 30 tablet, Refills: 11    Associated Diagnoses: Type 2 diabetes mellitus with hyperglycemia, without long-term current use of insulin             Discharge Procedure Orders  Diet general     Activity as tolerated     Remove dressing in 24 hours       Follow-up Information     Froylan López Jr, MD In 3 weeks.    Specialty:  Orthopedic  Surgery  Contact information:  [address]  [address]  [place] [id number]  [phone]                   Discharge Procedure Orders (must include Diet, Follow-up, Activity):    Discharge Procedure Orders (must include Diet, Follow-up, Activity)  Diet general     Activity as tolerated     Remove dressing in 24 hours

## 2017-09-15 NOTE — OP NOTE
DATE OF PROCEDURE:  9/15/2017     PREOPERATIVE DIAGNOSIS  Lumbar Radiculopathy    POSTOPERATIVE DIAGNOSIS  Lumbar Radiculopathy    PROCEDURE  Caudal sacrolumbar epidural steroid Marcaine injection  Intraoperative fluoroscopy  Epidurogram      SURGEON  Froylan López MD    ANESTHESIA  Monitored anesthesia care    ESTIMATED BLOOD LOSS  Less than 1 mL    INDICATION FOR SURGERY    Patient presents with complaints of back and associated leg radicular pain. The patients pain continues to persist, interfering in the patients quality of life and activities of daily living. Surgical procedure planned with steroid and Marcaine injection into the sacrolumbar epidural region in an attempt to improve leg radicular pain. The patient was informed that the surgical procedure provides no guarantee of improvement or worsening of present condition, but only an attempt to improve overall condition and function. The patient expressed understanding, and all questions were answered and still desired to proceed with operative procedure.      PROCEDURE IN DETAIL    The patient was taken to the operating room, and placed on operating table in prone position. All bony prominences were well padded and protected. Anesthesia achieved with monitored anesthesia care.  After adequate anesthesia was achieved, the lower back and sacral region was prepped and draped in the usual sterile fashion.  Approximately 3 ml of 0.25% Marcaine was utilized locally within skin and subcutaneous tissue. With the assistance of intraoperative fluoroscopy, an 18-gauge spinal needle was introduced and advanced into the epidural space via the sacral hiatus.  Confirmation was obtained with Omnipaque placed into the epidural space.  Mixture of approximately 4.5ml of 0.25% Marcaine preservative free, 4.5 ml of normal saline preservative free, and 2ml of 20mg Decadron was made and advanced into the epidural space via the sacral hiatus. At the completion of epidural  injection, the needle was slowly withdrawn. The surgical site was cleaned with normal saline with application of sterile occlusive Band-Aid. Anesthesia was reversed and the patient was transferred to recovery room in stable condition without immediate apparent surgical complications.

## 2017-09-15 NOTE — ANESTHESIA POSTPROCEDURE EVALUATION
"Anesthesia Post Evaluation    Patient: José Miguel Garcia    Procedure(s) Performed: Procedure(s) (LRB):  INJECTION-STEROID-EPIDURAL-LUMBAR (N/A)    Final Anesthesia Type: MAC  Patient location during evaluation: PACU  Patient participation: Yes- Able to Participate  Level of consciousness: awake and alert and oriented  Post-procedure vital signs: reviewed and stable  Pain management: adequate  Airway patency: patent  PONV status at discharge: No PONV  Anesthetic complications: no      Cardiovascular status: hemodynamically stable  Respiratory status: unassisted, spontaneous ventilation and room air  Hydration status: euvolemic  Follow-up not needed.        Visit Vitals  BP (!) 155/93   Pulse 68   Temp 36.6 °C (97.9 °F) (Oral)   Resp 18   Ht 5' 7" (1.702 m)   Wt 108.9 kg (240 lb)   SpO2 98%   BMI 37.59 kg/m²       Pain/Shyanne Score: Pain Assessment Performed: Yes (9/15/2017 10:30 AM)  Presence of Pain: denies (9/15/2017 10:30 AM)  Modified Shyanne Score: 20 (9/15/2017 10:30 AM)      "

## 2017-09-18 VITALS
DIASTOLIC BLOOD PRESSURE: 93 MMHG | TEMPERATURE: 98 F | RESPIRATION RATE: 18 BRPM | SYSTOLIC BLOOD PRESSURE: 155 MMHG | HEART RATE: 68 BPM | BODY MASS INDEX: 37.67 KG/M2 | HEIGHT: 67 IN | OXYGEN SATURATION: 98 % | WEIGHT: 240 LBS

## 2017-10-02 ENCOUNTER — OFFICE VISIT (OUTPATIENT)
Dept: ORTHOPEDICS | Facility: CLINIC | Age: 52
End: 2017-10-02
Payer: COMMERCIAL

## 2017-10-02 VITALS
HEART RATE: 72 BPM | SYSTOLIC BLOOD PRESSURE: 145 MMHG | DIASTOLIC BLOOD PRESSURE: 97 MMHG | BODY MASS INDEX: 37.99 KG/M2 | WEIGHT: 242.06 LBS | HEIGHT: 67 IN

## 2017-10-02 DIAGNOSIS — Z98.890 HX OF REPAIR OF RIGHT ROTATOR CUFF: Primary | ICD-10-CM

## 2017-10-02 PROCEDURE — 99213 OFFICE O/P EST LOW 20 MIN: CPT | Mod: S$GLB,,, | Performed by: ORTHOPAEDIC SURGERY

## 2017-10-08 NOTE — PROGRESS NOTES
Past Medical History:   Diagnosis Date    Arthritis     Diabetes mellitus     type 2    DVT (deep venous thrombosis) 2011    Approx. 2011, after having a bunion removed. pt was on warfarin for approx. 6 weeks. unsure of time    Sleep apnea     USES CPAP    Wears glasses        Past Surgical History:   Procedure Laterality Date    BUNIONECTOMY Left 2006    Pt developed a blood clot in leg, after having bunion removed. Had to do coumadin for approx. 6 weeks.    BUNIONECTOMY Left 2010    complicated by dvt    COLONOSCOPY N/A 11/17/2016    Procedure: COLONOSCOPY;  Surgeon: Keegan Vo MD;  Location: [facility];  Service: Endoscopy;  Laterality: N/A;    KNEE SURGERY      Patient does not recall which side    SHOULDER SURGERY Right     VASECTOMY Bilateral 2011       Current Outpatient Prescriptions   Medication Sig    metformin (GLUCOPHAGE-XR) 500 MG 24 hr tablet Take 1 tablet (500 mg total) by mouth daily with breakfast.     No current facility-administered medications for this visit.        Review of patient's allergies indicates:  No Known Allergies    Family History   Problem Relation Age of Onset    Heart block Mother     Kidney disease Mother     Cancer Paternal Aunt        Social History     Social History    Marital status: Single     Spouse name: N/A    Number of children: N/A    Years of education: N/A     Occupational History    Not on file.     Social History Main Topics    Smoking status: Former Smoker     Packs/day: 0.25     Years: 0.50    Smokeless tobacco: Never Used    Alcohol use 0.6 oz/week     1 Cans of beer per week    Drug use: No    Sexual activity: Not on file     Other Topics Concern    Not on file     Social History Narrative    No narrative on file       Chief Complaint:   Chief Complaint   Patient presents with    Right Shoulder - Pain       Consulting Physician: No ref. provider found    History of present illness: This is a 52 y.o. year old male following up  for right cuff repair 3-2-17.  Its his pain today at a 0 out of 10.  He has been doing therapy.    Review of Systems:    Constitution: Denies chills, fever, and sweats.    HENT: Denies headaches or blurry vision.    Cardiovascular: Denies chest pain or irregular heart beat.    Respiratory: Denies cough or shortness of breath.    Gastrointestinal: Denies abdominal pain, nausea, or vomiting.    Musculoskeletal:  Denies muscle cramps.    Neurological: Denies dizziness or focal weakness.    Psychiatric/Behavioral: Normal mental status.    Hematologic/Lymphatic: Denies bleeding problem or easy bruising/bleeding.    Skin: Denies rash or suspicious lesions.      Examination:    Vital Signs:    Vitals:    10/02/17 0825   BP: (!) 145/97   Pulse: 72       Body mass index is 37.91 kg/m².    This a well-developed, well nourished patient in no acute distress.    Alert and oriented and cooperative to examination.       Physical Exam: right shoulder    Inspection/Observation   Swelling:   none  Erythema:   none  Bruising:   none  Wounds:   healed  Drainage:  None    Range of Motion   Full    Muscle Strength   4+-5/5    Other   Sensation:  normal  Pulse:    palpable    Imaging:      Assessment: Hx of repair of right rotator cuff        Plan:  He is doing very well. We will give him a full release today. Discussed activity modification.    DISCLAIMER: This note may have been dictated using voice recognition software and may contain grammatical errors. Report sent to referring provider as required.

## 2019-01-16 ENCOUNTER — HOSPITAL ENCOUNTER (OUTPATIENT)
Dept: RADIOLOGY | Facility: HOSPITAL | Age: 54
Discharge: HOME | End: 2019-01-16
Attending: NURSE PRACTITIONER
Payer: COMMERCIAL

## 2019-01-16 DIAGNOSIS — M25.552 LEFT HIP PAIN: ICD-10-CM

## 2019-01-16 PROCEDURE — 73502 X-RAY EXAM HIP UNI 2-3 VIEWS: CPT | Mod: 26,LT,, | Performed by: RADIOLOGY

## 2019-01-16 PROCEDURE — 73502 XR PELVIS 3 VIEW INC HIP 2 VIEW LEFT: ICD-10-PCS | Mod: 26,LT,, | Performed by: RADIOLOGY

## 2019-01-16 PROCEDURE — 73502 X-RAY EXAM HIP UNI 2-3 VIEWS: CPT | Mod: TC,FY,LT

## 2019-01-28 PROBLEM — M85.38 OSTEITIS CONDENSANS ILII: Status: ACTIVE | Noted: 2019-01-28

## 2019-01-28 PROBLEM — M16.12 LOCALIZED OSTEOARTHROSIS OF LEFT HIP: Status: ACTIVE | Noted: 2019-01-28

## 2019-01-28 PROBLEM — M25.512 ACUTE PAIN OF LEFT SHOULDER: Status: RESOLVED | Noted: 2017-06-05 | Resolved: 2019-01-28

## 2019-01-28 PROBLEM — M54.42 ACUTE BILATERAL LOW BACK PAIN WITH LEFT-SIDED SCIATICA: Status: RESOLVED | Noted: 2017-08-21 | Resolved: 2019-01-28

## 2019-01-28 PROBLEM — G44.319 ACUTE POST-TRAUMATIC HEADACHE, NOT INTRACTABLE: Status: RESOLVED | Noted: 2017-05-09 | Resolved: 2019-01-28

## 2019-01-28 PROBLEM — M54.50 ACUTE BILATERAL LOW BACK PAIN WITHOUT SCIATICA: Status: RESOLVED | Noted: 2017-05-09 | Resolved: 2019-01-28

## 2019-08-19 ENCOUNTER — HOSPITAL ENCOUNTER (OUTPATIENT)
Dept: RADIOLOGY | Facility: HOSPITAL | Age: 54
Discharge: HOME | End: 2019-08-19
Attending: NURSE PRACTITIONER
Payer: COMMERCIAL

## 2019-08-19 DIAGNOSIS — R06.02 SHORTNESS OF BREATH: ICD-10-CM

## 2019-09-18 ENCOUNTER — HOSPITAL ENCOUNTER (OUTPATIENT)
Dept: CARDIOLOGY | Facility: HOSPITAL | Age: 54
Discharge: HOME | End: 2019-09-18
Attending: NURSE PRACTITIONER
Payer: COMMERCIAL

## 2019-09-18 ENCOUNTER — HOSPITAL ENCOUNTER (OUTPATIENT)
Dept: RADIOLOGY | Facility: HOSPITAL | Age: 54
Discharge: HOME | End: 2019-09-18
Attending: NURSE PRACTITIONER
Payer: COMMERCIAL

## 2019-09-18 VITALS
BODY MASS INDEX: 40.81 KG/M2 | HEIGHT: 67 IN | WEIGHT: 260 LBS | SYSTOLIC BLOOD PRESSURE: 128 MMHG | HEART RATE: 72 BPM | DIASTOLIC BLOOD PRESSURE: 87 MMHG

## 2019-09-18 DIAGNOSIS — R06.02 SHORTNESS OF BREATH AT REST: ICD-10-CM

## 2019-09-18 DIAGNOSIS — R53.83 FATIGUE, UNSPECIFIED TYPE: ICD-10-CM

## 2019-09-18 DIAGNOSIS — E78.5 DYSLIPIDEMIA ASSOCIATED WITH TYPE 2 DIABETES MELLITUS: ICD-10-CM

## 2019-09-18 DIAGNOSIS — I10 ESSENTIAL HYPERTENSION: ICD-10-CM

## 2019-09-18 DIAGNOSIS — E11.69 DYSLIPIDEMIA ASSOCIATED WITH TYPE 2 DIABETES MELLITUS: ICD-10-CM

## 2019-09-18 PROCEDURE — 93016 CV STRESS TEST SUPVJ ONLY: CPT | Mod: ,,, | Performed by: INTERNAL MEDICINE

## 2019-09-18 PROCEDURE — 93016 STRESS TEST WITH MYOCARDIAL PERFUSION (CUPID ONLY): ICD-10-PCS | Mod: ,,, | Performed by: INTERNAL MEDICINE

## 2019-09-18 PROCEDURE — 93017 CV STRESS TEST TRACING ONLY: CPT

## 2019-09-18 PROCEDURE — A9500 TC99M SESTAMIBI: HCPCS

## 2019-09-18 PROCEDURE — 78452 STRESS TEST WITH MYOCARDIAL PERFUSION (CUPID ONLY): ICD-10-PCS | Mod: 26,,, | Performed by: INTERNAL MEDICINE

## 2019-09-18 PROCEDURE — 78452 HT MUSCLE IMAGE SPECT MULT: CPT | Mod: 26,,, | Performed by: INTERNAL MEDICINE

## 2019-09-18 PROCEDURE — 63600175 PHARM REV CODE 636 W HCPCS: Performed by: NURSE PRACTITIONER

## 2019-09-18 PROCEDURE — 93018 STRESS TEST WITH MYOCARDIAL PERFUSION (CUPID ONLY): ICD-10-PCS | Mod: ,,, | Performed by: INTERNAL MEDICINE

## 2019-09-18 PROCEDURE — 93018 CV STRESS TEST I&R ONLY: CPT | Mod: ,,, | Performed by: INTERNAL MEDICINE

## 2019-09-18 RX ORDER — REGADENOSON 0.08 MG/ML
0.4 INJECTION, SOLUTION INTRAVENOUS ONCE
Status: COMPLETED | OUTPATIENT
Start: 2019-09-18 | End: 2019-09-18

## 2019-09-18 RX ADMIN — REGADENOSON 0.4 MG: 0.08 INJECTION, SOLUTION INTRAVENOUS at 10:09

## 2019-09-19 LAB
CV STRESS BASE HR: 64 BPM
DIASTOLIC BLOOD PRESSURE: 79 MMHG
EJECTION FRACTION- HIGH: 65 %
END DIASTOLIC INDEX-HIGH: 153 ML/M2
END DIASTOLIC INDEX-LOW: 93 ML/M2
END SYSTOLIC INDEX-HIGH: 71 ML/M2
END SYSTOLIC INDEX-LOW: 31 ML/M2
NUC REST DIASTOLIC VOLUME INDEX: 143
NUC REST EJECTION FRACTION: 42
NUC REST SYSTOLIC VOLUME INDEX: 82
NUC STRESS DIASTOLIC VOLUME INDEX: 128
NUC STRESS EJECTION FRACTION: 43 %
NUC STRESS SYSTOLIC VOLUME INDEX: 73
OHS CV CPX 85 PERCENT MAX PREDICTED HEART RATE MALE: 139
OHS CV CPX MAX PREDICTED HEART RATE: 164
OHS CV CPX PATIENT IS FEMALE: 0
OHS CV CPX PATIENT IS MALE: 1
OHS CV CPX PEAK DIASTOLIC BLOOD PRESSURE: 87 MMHG
OHS CV CPX PEAK HEAR RATE: 90 BPM
OHS CV CPX PEAK RATE PRESSURE PRODUCT: NORMAL
OHS CV CPX PEAK SYSTOLIC BLOOD PRESSURE: 128 MMHG
OHS CV CPX PERCENT MAX PREDICTED HEART RATE ACHIEVED: 55
OHS CV CPX RATE PRESSURE PRODUCT PRESENTING: 7808
RETIRED EF AND QEF - SEE NOTES: 53 %
STRESS ECHO TARGET HR: 139.4 BPM
SYSTOLIC BLOOD PRESSURE: 122 MMHG

## 2019-11-11 PROBLEM — E11.69 DYSLIPIDEMIA ASSOCIATED WITH TYPE 2 DIABETES MELLITUS: Status: ACTIVE | Noted: 2017-08-07

## 2019-11-11 PROBLEM — E78.5 DYSLIPIDEMIA ASSOCIATED WITH TYPE 2 DIABETES MELLITUS: Status: ACTIVE | Noted: 2017-08-07

## 2019-11-11 PROBLEM — R80.9 URINE TEST POSITIVE FOR MICROALBUMINURIA: Status: ACTIVE | Noted: 2019-11-11

## 2019-11-20 NOTE — PROGRESS NOTES
Anemia (new pt consult)      José Miguel Garcia is a 54 y.o.  This is a 54 yr old patient who is extremely worried that he may have cancer . He is here for anemia. He has sickle cell trait . His latest iron tests reveal that he is slightly low in iron saturation   He developed a DVT in his left calf after having a bunion removed. This resolved after anticoagulation     Past Medical History:   Diagnosis Date    Arthritis     Diabetes mellitus     type 2    DVT (deep venous thrombosis) 2011    Approx. 2011, after having a bunion removed. pt was on warfarin for approx. 6 weeks. unsure of time    Sleep apnea     USES CPAP    Wears glasses      Past Surgical History:   Procedure Laterality Date    BUNIONECTOMY Left 2006    Pt developed a blood clot in leg, after having bunion removed. Had to do coumadin for approx. 6 weeks.    BUNIONECTOMY Left 2010    complicated by dvt    COLONOSCOPY N/A 11/17/2016    Procedure: COLONOSCOPY;  Surgeon: Keegan Vo MD;  Location: [facility];  Service: Endoscopy;  Laterality: N/A;    KNEE SURGERY      Patient does not recall which side    SHOULDER SURGERY Right     VASECTOMY Bilateral 2011       Current Outpatient Medications:     alcohol swabs (ALCOHOL PREP PADS) PadM, Apply 1 each topically 2 (two) times daily., Disp: 100 each, Rfl: 11    atorvastatin (LIPITOR) 20 MG tablet, Take 1 tablet (20 mg total) by mouth once daily., Disp: 90 tablet, Rfl: 3    blood sugar diagnostic Strp, 1 strip by Misc.(Non-Drug; Combo Route) route 2 (two) times daily., Disp: 100 strip, Rfl: 11    ferrous sulfate (FEOSOL) 325 mg (65 mg iron) Tab tablet, Take 1 tablet (325 mg total) by mouth once daily., Disp: 90 tablet, Rfl: 0    lancets Misc, 1 each by Misc.(Non-Drug; Combo Route) route 2 (two) times daily., Disp: 100 each, Rfl: 11    lancing device (BD LANCET DEVICE) Misc, 1 each by Misc.(Non-Drug; Combo Route) route 2 (two) times daily., Disp: 1 each, Rfl: 1    losartan (COZAAR) 25  "MG tablet, Take 1 tablet (25 mg total) by mouth once daily., Disp: 90 tablet, Rfl: 3    metFORMIN (GLUCOPHAGE) 500 MG tablet, Take 1 tablet (500 mg total) by mouth daily with breakfast., Disp: 90 tablet, Rfl: 3  Review of patient's allergies indicates:  No Known Allergies  Social History     Tobacco Use    Smoking status: Former Smoker     Packs/day: 0.25     Years: 0.50     Pack years: 0.12    Smokeless tobacco: Never Used   Substance Use Topics    Alcohol use: Yes     Alcohol/week: 1.0 standard drinks     Types: 1 Cans of beer per week    Drug use: No     Family History   Problem Relation Age of Onset    Heart block Mother     Kidney disease Mother     Cancer Paternal Aunt        CONSTITUTIONAL: No fevers, chills, night sweats, wt. loss, appetite changes  SKIN: no rashes or itching  ENT: No headaches, head trauma, vision changes, or eye pain  LYMPH NODES: None noticed   CV: No chest pain, palpitations.   RESP: No dyspnea on exertion, cough, wheezing, or hemoptysis  GI: No nausea, emesis, diarrhea, constipation, melena, hematochezia, pain.   : No dysuria, hematuria, urgency, or frequency   HEME: No easy bruising, bleeding problems  PSYCHIATRIC: No depression, anxiety, psychosis, hallucinations.  NEURO: No seizures, memory loss, dizziness or difficulty sleeping  MSK: No arthralgias or joint swelling         /87   Pulse 74   Temp 99 °F (37.2 °C)   Resp 12   Ht 5' 7" (1.702 m)   Wt 120 kg (264 lb 8.8 oz)   SpO2 (!) 94%   BMI 41.43 kg/m²   Gen: NAD, A and O x3,   Psych: pleasant affect, normal thought process  Eyes: Pupils round and non dilated, EOM intact  Nose: Nares patent  OP clear, mucosa patent  Neck: suppple, no JVD, trachea midline, no palpable mass, no adenopathy  Lungs: CTAB, no wheezes, no use of accessory muscles  CV: S1S2 with RRR, No mrg  Abd: soft, NTND, + BS, No HSM, no ascites  Extr: No CCE, ANDREIA, strength normal, good capillary refill  Neuro: steady gait, CNs grossly " intact  Skin: intact, no lesions noted  Rheum: No joint swelling    Lab Results   Component Value Date    WBC 5.7 11/04/2019    HGB 12.2 (L) 11/04/2019    HCT 37.4 (L) 11/04/2019    MCV 86.0 11/04/2019     11/04/2019         CMP  Sodium   Date Value Ref Range Status   11/04/2019 140 135 - 146 mmol/L Final     Potassium   Date Value Ref Range Status   11/04/2019 3.8 3.5 - 5.3 mmol/L Final     Chloride   Date Value Ref Range Status   11/04/2019 103 98 - 110 mmol/L Final     CO2   Date Value Ref Range Status   11/04/2019 29 20 - 32 mmol/L Final     Glucose   Date Value Ref Range Status   11/04/2019 168 (H) 65 - 139 mg/dL Final     Comment:               Non-fasting reference interval          BUN, Bld   Date Value Ref Range Status   11/04/2019 14 7 - 25 mg/dL Final     Creatinine   Date Value Ref Range Status   11/04/2019 1.18 0.70 - 1.33 mg/dL Final     Comment:     For patients >49 years of age, the reference limit  for Creatinine is approximately 13% higher for people  identified as -American.          Calcium   Date Value Ref Range Status   11/04/2019 9.7 8.6 - 10.3 mg/dL Final     Total Protein   Date Value Ref Range Status   11/04/2019 6.9 6.1 - 8.1 g/dL Final     Albumin   Date Value Ref Range Status   11/04/2019 4.3 3.6 - 5.1 g/dL Final     Total Bilirubin   Date Value Ref Range Status   11/04/2019 0.3 0.2 - 1.2 mg/dL Final     Alkaline Phosphatase   Date Value Ref Range Status   11/04/2019 55 40 - 115 U/L Final     AST   Date Value Ref Range Status   11/04/2019 18 10 - 35 U/L Final     ALT   Date Value Ref Range Status   11/04/2019 18 9 - 46 U/L Final     eGFR if    Date Value Ref Range Status   11/04/2019 79 > OR = 60 mL/min/1.73m2 Final     eGFR if non    Date Value Ref Range Status   11/04/2019 69 > OR = 60 mL/min/1.73m2 Final       Encounter for medication counseling    Hb-SS disease without crisis  -     Garcia test, indirect, qualitative; Future; Expected  date: 11/21/2019  -     Haptoglobin; Future; Expected date: 11/21/2019  -     Immunoglobulins (IgG, IgA, IgM) Quantitative; Future; Expected date: 11/21/2019  -     Immunoglobulin free LT chains blood; Future; Expected date: 11/21/2019  -     Lactate dehydrogenase; Future; Expected date: 11/21/2019  -     Iron and TIBC; Future; Expected date: 11/21/2019  -     Methylmalonic acid, serum; Future; Expected date: 11/21/2019  -     Vitamin B1; Future; Expected date: 11/22/2019  -     Vitamin B6; Future; Expected date: 11/21/2019  -     Thalasseima and Hemoglobinopathy Eval; Future; Expected date: 11/21/2019  -     Hemoglobin Electrophoresis,Hgb A2 Hardeep.; Future; Expected date: 11/21/2019    On statin therapy    History of DVT (deep vein thrombosis)    Anemia, unspecified type        I have explained the lengthy differential diagnosis for anemia.Labs will be ordered to evaluate for bone marrow suppression, peripheral destruction as well as nutritional deficits. An ultrasound of the abdomen may be indicated to evaluate for hepatosplenomegaly which can lead to sequestration of cells. Ultimately a bone marrow biopsy and aspirate may be in order. SPEP and UPEP will also be included to look for a possible paraproteinemia.   I believe his anemia is related to his sickle cell but will verify such and test for other abnormalities just to be thorough . He is to increase his intake of iron rich food   Labs today   No need for workup of a provoked DVT   RTC in about two weeks to discuss  results and for further recs    Thank you for allowing me to evaluate and participate in the care of this pleasant patient. Please fell free to call me with any questions or concerns.    WarmlyPauline MD    This note was dictated with Dragon and briefly proofread. Please excuse any sentences that may be unclear or words misspelled

## 2019-11-21 ENCOUNTER — LAB VISIT (OUTPATIENT)
Dept: LAB | Facility: HOSPITAL | Age: 54
End: 2019-11-21
Attending: INTERNAL MEDICINE
Payer: COMMERCIAL

## 2019-11-21 ENCOUNTER — OFFICE VISIT (OUTPATIENT)
Dept: HEMATOLOGY/ONCOLOGY | Facility: CLINIC | Age: 54
End: 2019-11-21
Payer: COMMERCIAL

## 2019-11-21 VITALS
RESPIRATION RATE: 12 BRPM | SYSTOLIC BLOOD PRESSURE: 123 MMHG | HEIGHT: 67 IN | TEMPERATURE: 99 F | BODY MASS INDEX: 41.52 KG/M2 | WEIGHT: 264.56 LBS | DIASTOLIC BLOOD PRESSURE: 87 MMHG | OXYGEN SATURATION: 94 % | HEART RATE: 74 BPM

## 2019-11-21 DIAGNOSIS — D64.9 ANEMIA, UNSPECIFIED TYPE: ICD-10-CM

## 2019-11-21 DIAGNOSIS — D57.1 HB-SS DISEASE WITHOUT CRISIS: ICD-10-CM

## 2019-11-21 DIAGNOSIS — Z71.89 ENCOUNTER FOR MEDICATION COUNSELING: Primary | ICD-10-CM

## 2019-11-21 DIAGNOSIS — Z79.899 ON STATIN THERAPY: ICD-10-CM

## 2019-11-21 DIAGNOSIS — Z86.718 HISTORY OF DVT (DEEP VEIN THROMBOSIS): ICD-10-CM

## 2019-11-21 LAB
BLD GP AB SCN CELLS X3 SERPL QL: NORMAL
HAPTOGLOB SERPL-MCNC: 50 MG/DL (ref 30–250)
IGA SERPL-MCNC: 230 MG/DL (ref 40–350)
IGG SERPL-MCNC: 1291 MG/DL (ref 650–1600)
IGM SERPL-MCNC: 62 MG/DL (ref 50–300)
IRON SERPL-MCNC: 40 UG/DL (ref 45–160)
LDH SERPL L TO P-CCNC: 130 U/L (ref 110–260)
SATURATED IRON: 12 % (ref 20–50)
TOTAL IRON BINDING CAPACITY: 345 UG/DL (ref 250–450)
TRANSFERRIN SERPL-MCNC: 233 MG/DL (ref 200–375)

## 2019-11-21 PROCEDURE — 83921 ORGANIC ACID SINGLE QUANT: CPT

## 2019-11-21 PROCEDURE — 83020 HEMOGLOBIN ELECTROPHORESIS: CPT

## 2019-11-21 PROCEDURE — 99245 OFF/OP CONSLTJ NEW/EST HI 55: CPT | Mod: S$GLB,,, | Performed by: INTERNAL MEDICINE

## 2019-11-21 PROCEDURE — 83010 ASSAY OF HAPTOGLOBIN QUANT: CPT

## 2019-11-21 PROCEDURE — 83615 LACTATE (LD) (LDH) ENZYME: CPT

## 2019-11-21 PROCEDURE — 1125F PR PAIN SEVERITY QUANTIFIED, PAIN PRESENT: ICD-10-PCS | Mod: S$GLB,,, | Performed by: INTERNAL MEDICINE

## 2019-11-21 PROCEDURE — 83520 IMMUNOASSAY QUANT NOS NONAB: CPT | Mod: 59

## 2019-11-21 PROCEDURE — 36415 COLL VENOUS BLD VENIPUNCTURE: CPT

## 2019-11-21 PROCEDURE — 84425 ASSAY OF VITAMIN B-1: CPT

## 2019-11-21 PROCEDURE — 82784 ASSAY IGA/IGD/IGG/IGM EACH: CPT

## 2019-11-21 PROCEDURE — 99999 PR PBB SHADOW E&M-EST. PATIENT-LVL III: CPT | Mod: PBBFAC,,, | Performed by: INTERNAL MEDICINE

## 2019-11-21 PROCEDURE — 86850 RBC ANTIBODY SCREEN: CPT

## 2019-11-21 PROCEDURE — 85660 RBC SICKLE CELL TEST: CPT

## 2019-11-21 PROCEDURE — 99245 PR OFFICE CONSULTATION,LEVEL V: ICD-10-PCS | Mod: S$GLB,,, | Performed by: INTERNAL MEDICINE

## 2019-11-21 PROCEDURE — 99999 PR PBB SHADOW E&M-EST. PATIENT-LVL III: ICD-10-PCS | Mod: PBBFAC,,, | Performed by: INTERNAL MEDICINE

## 2019-11-21 PROCEDURE — 83020 HEMOGLOBIN ELECTROPHORESIS: CPT | Mod: 91

## 2019-11-21 PROCEDURE — 83540 ASSAY OF IRON: CPT

## 2019-11-21 PROCEDURE — 3008F BODY MASS INDEX DOCD: CPT | Mod: CPTII,S$GLB,, | Performed by: INTERNAL MEDICINE

## 2019-11-21 PROCEDURE — 1125F AMNT PAIN NOTED PAIN PRSNT: CPT | Mod: S$GLB,,, | Performed by: INTERNAL MEDICINE

## 2019-11-21 PROCEDURE — 83021 HEMOGLOBIN CHROMOTOGRAPHY: CPT | Mod: 91

## 2019-11-21 PROCEDURE — 84207 ASSAY OF VITAMIN B-6: CPT

## 2019-11-21 PROCEDURE — 3008F PR BODY MASS INDEX (BMI) DOCUMENTED: ICD-10-PCS | Mod: CPTII,S$GLB,, | Performed by: INTERNAL MEDICINE

## 2019-11-21 NOTE — LETTER
November 21, 2019      Lisa Y. Cooksey, STEPHANIE  [address]  [place] [address]           [facility]  [address]  [facility] [address]  Phone: [phone]          Patient: José Miguel Garcia   MR Number: [id number]   YOB: 1965   Date of Visit: 11/21/2019       Dear Lisa Y. Cooksey:    Thank you for referring José Miguel Garcia to me for evaluation. Attached you will find relevant portions of my assessment and plan of care.    If you have questions, please do not hesitate to call me. I look forward to following José Miguel Garcia along with you.    Sincerely,    Pauline García MD    Enclosure  CC:  No Recipients    If you would like to receive this communication electronically, please contact [email or web address] or [phone] to request more information on [employer] access.    For providers and/or their staff who would like to refer a patient to [facility], please contact us through our one-stop-shop provider referral line, [facility] , at [phone].    If you feel you have received this communication in error or would no longer like to receive these types of communications, please e-mail [email or web address]

## 2019-11-22 LAB
KAPPA LC SER QL IA: 1.4 MG/DL (ref 0.33–1.94)
KAPPA LC/LAMBDA SER IA: 0.93 (ref 0.26–1.65)
LAMBDA LC SER QL IA: 1.51 MG/DL (ref 0.57–2.63)

## 2019-11-25 LAB — HGB S BLD QL: POSITIVE

## 2019-11-26 LAB
FERRITIN SERPL-MCNC: 131 MCG/L (ref 24–336)
HEMATOLOGIST REVIEW: ABNORMAL
HGB A MFR BLD ELPH: 57.2 % (ref 95.8–98)
HGB A2 MFR BLD HPLC: 3.4 % (ref 2.2–3.2)
HGB A2 MFR BLD: 3.3 % (ref 2–3.3)
HGB F MFR BLD: 0.3 % (ref 0–0.9)
HGB FRACT BLD ELPH PH6.0-IMP: NORMAL
HGB FRACT BLD ELPH-IMP: ABNORMAL
HGB OTHER MFR BLD ELPH: ABNORMAL %
METHYLMALONATE SERPL-SCNC: 0.19 UMOL/L
PYRIDOXAL SERPL-MCNC: 12 UG/L (ref 5–50)
THEVP VARIANT 2: ABNORMAL
THEVP VARIANT 3: ABNORMAL
VIT B1 BLD-MCNC: 39 UG/L (ref 38–122)

## 2019-12-06 ENCOUNTER — OFFICE VISIT (OUTPATIENT)
Dept: HEMATOLOGY/ONCOLOGY | Facility: CLINIC | Age: 54
End: 2019-12-06
Payer: COMMERCIAL

## 2019-12-06 VITALS
RESPIRATION RATE: 20 BRPM | HEART RATE: 72 BPM | BODY MASS INDEX: 41.98 KG/M2 | WEIGHT: 267.44 LBS | SYSTOLIC BLOOD PRESSURE: 144 MMHG | DIASTOLIC BLOOD PRESSURE: 82 MMHG | TEMPERATURE: 99 F | OXYGEN SATURATION: 98 % | HEIGHT: 67 IN

## 2019-12-06 DIAGNOSIS — I82.4Z2 DEEP VEIN THROMBOSIS (DVT) OF DISTAL VEIN OF LEFT LOWER EXTREMITY, UNSPECIFIED CHRONICITY: ICD-10-CM

## 2019-12-06 DIAGNOSIS — D57.1 HB-SS DISEASE WITHOUT CRISIS: ICD-10-CM

## 2019-12-06 DIAGNOSIS — D64.9 ANEMIA, UNSPECIFIED TYPE: ICD-10-CM

## 2019-12-06 DIAGNOSIS — Z79.899 ON STATIN THERAPY: Primary | ICD-10-CM

## 2019-12-06 PROCEDURE — 99999 PR PBB SHADOW E&M-EST. PATIENT-LVL III: CPT | Mod: PBBFAC,,, | Performed by: INTERNAL MEDICINE

## 2019-12-06 PROCEDURE — 3008F PR BODY MASS INDEX (BMI) DOCUMENTED: ICD-10-PCS | Mod: CPTII,S$GLB,, | Performed by: INTERNAL MEDICINE

## 2019-12-06 PROCEDURE — 99999 PR PBB SHADOW E&M-EST. PATIENT-LVL III: ICD-10-PCS | Mod: PBBFAC,,, | Performed by: INTERNAL MEDICINE

## 2019-12-06 PROCEDURE — 99214 PR OFFICE/OUTPT VISIT, EST, LEVL IV, 30-39 MIN: ICD-10-PCS | Mod: S$GLB,,, | Performed by: INTERNAL MEDICINE

## 2019-12-06 PROCEDURE — 99214 OFFICE O/P EST MOD 30 MIN: CPT | Mod: S$GLB,,, | Performed by: INTERNAL MEDICINE

## 2019-12-06 PROCEDURE — 3008F BODY MASS INDEX DOCD: CPT | Mod: CPTII,S$GLB,, | Performed by: INTERNAL MEDICINE

## 2019-12-06 NOTE — PROGRESS NOTES
Anemia   Chief complaint feel dizzy    José Miguel Timothy Garcia is a 54 y.o.  This is a 54 yr old patient who is extremely worried that he may have cancer . He is here for anemia. He has sickle cell trait . His latest iron tests reveal that he is slightly low in iron saturation   He developed a DVT in his left calf after having a bunion removed. This resolved after anticoagulation and he has not had recurrence    Past Medical History:   Diagnosis Date    Diabetes mellitus     type 2    DVT (deep venous thrombosis) 2011    Approx. 2011, after having a bunion removed. pt was on warfarin for approx. 6 weeks. unsure of time    Sickle cell anemia     pt states all his life    Sleep apnea     USES CPAP    Wears glasses      Past Surgical History:   Procedure Laterality Date    BUNIONECTOMY Left 2006    Pt developed a blood clot in leg, after having bunion removed. Had to do coumadin for approx. 6 weeks.    BUNIONECTOMY Left 2010    complicated by dvt    COLONOSCOPY N/A 11/17/2016    Procedure: COLONOSCOPY;  Surgeon: Keegan Vo MD;  Location: [facility];  Service: Endoscopy;  Laterality: N/A;    KNEE SURGERY      Patient does not recall which side    SHOULDER SURGERY Right     VASECTOMY Bilateral 2011       Current Outpatient Medications:     alcohol swabs (ALCOHOL PREP PADS) PadM, Apply 1 each topically 2 (two) times daily., Disp: 100 each, Rfl: 11    atorvastatin (LIPITOR) 20 MG tablet, Take 1 tablet (20 mg total) by mouth once daily., Disp: 90 tablet, Rfl: 3    blood sugar diagnostic Strp, 1 strip by Misc.(Non-Drug; Combo Route) route 2 (two) times daily., Disp: 100 strip, Rfl: 11    ferrous sulfate (FEOSOL) 325 mg (65 mg iron) Tab tablet, Take 1 tablet (325 mg total) by mouth once daily., Disp: 90 tablet, Rfl: 0    lancets Misc, 1 each by Misc.(Non-Drug; Combo Route) route 2 (two) times daily., Disp: 100 each, Rfl: 11    lancing device (BD LANCET DEVICE) Misc, 1 each by Misc.(Non-Drug; Combo Route)  "route 2 (two) times daily., Disp: 1 each, Rfl: 1    losartan (COZAAR) 25 MG tablet, Take 1 tablet (25 mg total) by mouth once daily., Disp: 90 tablet, Rfl: 3    metFORMIN (GLUCOPHAGE) 500 MG tablet, Take 1 tablet (500 mg total) by mouth daily with breakfast., Disp: 90 tablet, Rfl: 3  Review of patient's allergies indicates:  No Known Allergies  Social History     Tobacco Use    Smoking status: Former Smoker     Packs/day: 0.25     Years: 0.50     Pack years: 0.12    Smokeless tobacco: Never Used   Substance Use Topics    Alcohol use: Yes     Alcohol/week: 1.0 standard drinks     Types: 1 Cans of beer per week    Drug use: No     Family History   Problem Relation Age of Onset    Heart block Mother     Kidney disease Mother     Cancer Paternal Aunt        CONSTITUTIONAL: No fevers, chills, night sweats, wt. loss, appetite changes  SKIN: no rashes or itching  ENT: No headaches, head trauma, vision changes, or eye pain  LYMPH NODES: None noticed   CV: No chest pain, palpitations.   RESP: No dyspnea on exertion, cough, wheezing, or hemoptysis  GI: No nausea, emesis, diarrhea, constipation, melena, hematochezia, pain.   : No dysuria, hematuria, urgency, or frequency   HEME: No easy bruising, bleeding problems  PSYCHIATRIC: No depression, anxiety, psychosis, hallucinations.  NEURO: No seizures, memory loss, intermittent dizziness   MSK: No arthralgias or joint swelling         BP (!) 144/82   Pulse 72   Temp 98.7 °F (37.1 °C) (Oral)   Resp 20   Ht 5' 7" (1.702 m)   Wt 121.3 kg (267 lb 6.7 oz)   SpO2 98%   BMI 41.88 kg/m²   Gen: NAD, A and O x3, well groomed conversant  Psych: pleasant affect, normal thought process  Eyes: Pupils round and non dilated, EOM intact  Nose: Nares patent  OP clear, mucosa patent  Neck: suppple, no JVD, trachea midline, no palpable mass no bruit  Lungs: CTAB, no wheezes, no use of accessory muscles  CV: S1S2 with RRR, No mrg  Abd: soft, NTND, + BS, No HSM, no ascites  Extr: No " CCE, ANDREIA, strength normal, good capillary refill  Neuro: steady gait, CNs grossly intact  Skin: intact, no lesions noted  Rheum: No joint swelling    Lab Results   Component Value Date    WBC 5.7 11/04/2019    HGB 12.2 (L) 11/04/2019    HCT 37.4 (L) 11/04/2019    MCV 86.0 11/04/2019     11/04/2019         CMP  Sodium   Date Value Ref Range Status   11/04/2019 140 135 - 146 mmol/L Final     Potassium   Date Value Ref Range Status   11/04/2019 3.8 3.5 - 5.3 mmol/L Final     Chloride   Date Value Ref Range Status   11/04/2019 103 98 - 110 mmol/L Final     CO2   Date Value Ref Range Status   11/04/2019 29 20 - 32 mmol/L Final     Glucose   Date Value Ref Range Status   11/04/2019 168 (H) 65 - 139 mg/dL Final     Comment:               Non-fasting reference interval          BUN, Bld   Date Value Ref Range Status   11/04/2019 14 7 - 25 mg/dL Final     Creatinine   Date Value Ref Range Status   11/04/2019 1.18 0.70 - 1.33 mg/dL Final     Comment:     For patients >49 years of age, the reference limit  for Creatinine is approximately 13% higher for people  identified as -American.          Calcium   Date Value Ref Range Status   11/04/2019 9.7 8.6 - 10.3 mg/dL Final     Total Protein   Date Value Ref Range Status   11/04/2019 6.9 6.1 - 8.1 g/dL Final     Albumin   Date Value Ref Range Status   11/04/2019 4.3 3.6 - 5.1 g/dL Final     Total Bilirubin   Date Value Ref Range Status   11/04/2019 0.3 0.2 - 1.2 mg/dL Final     Alkaline Phosphatase   Date Value Ref Range Status   11/04/2019 55 40 - 115 U/L Final     AST   Date Value Ref Range Status   11/04/2019 18 10 - 35 U/L Final     ALT   Date Value Ref Range Status   11/04/2019 18 9 - 46 U/L Final     eGFR if    Date Value Ref Range Status   11/04/2019 79 > OR = 60 mL/min/1.73m2 Final     eGFR if non    Date Value Ref Range Status   11/04/2019 69 > OR = 60 mL/min/1.73m2 Final       On statin therapy    Deep vein thrombosis (DVT)  of distal vein of left lower extremity, unspecified chronicity    Hb-SS disease without crisis    Anemia, unspecified type        1.  History of DVT no evidence of inherited thrombophilia  2.  Diabetes mellitus tolerating metformin  3.  Sickle cell trait with anemia no evidence of hypoxemia  Explained that it is imperative that he not be iron overloaded.  Iron overload can lead to cardiac compromise as well as hepatic compromise.  Is only to be transfused when it is imperative  He must gain better control of hypertriglyceridemia he is to follow up with his primary care physician for such  Dc from heme   No need for workup of a provoked DVT and no need for lifelong anticoagulation at this time       Thank you for allowing me to evaluate and participate in the care of this pleasant patient. Please fell free to call me with any questions or concerns.    Warmly,  Pauline García MD    This note was dictated with Dragon and briefly proofread. Please excuse any sentences that may be unclear or words misspelled

## 2020-01-03 ENCOUNTER — HOSPITAL ENCOUNTER (OUTPATIENT)
Dept: RADIOLOGY | Facility: HOSPITAL | Age: 55
Discharge: HOME | End: 2020-01-03
Attending: NURSE PRACTITIONER
Payer: COMMERCIAL

## 2020-01-03 DIAGNOSIS — M25.561 RIGHT KNEE PAIN, UNSPECIFIED CHRONICITY: ICD-10-CM

## 2020-01-03 PROCEDURE — 73562 X-RAY EXAM OF KNEE 3: CPT | Mod: TC,FY,RT

## 2020-01-03 PROCEDURE — 73562 X-RAY EXAM OF KNEE 3: CPT | Mod: 26,RT,, | Performed by: RADIOLOGY

## 2020-01-03 PROCEDURE — 73562 XR KNEE 3 VIEW RIGHT: ICD-10-PCS | Mod: 26,RT,, | Performed by: RADIOLOGY

## 2020-01-28 ENCOUNTER — OFFICE VISIT (OUTPATIENT)
Dept: ORTHOPEDICS | Facility: CLINIC | Age: 55
End: 2020-01-28
Payer: COMMERCIAL

## 2020-01-28 VITALS
SYSTOLIC BLOOD PRESSURE: 121 MMHG | DIASTOLIC BLOOD PRESSURE: 81 MMHG | HEART RATE: 89 BPM | HEIGHT: 67 IN | BODY MASS INDEX: 41.91 KG/M2 | WEIGHT: 267 LBS

## 2020-01-28 DIAGNOSIS — M17.11 PRIMARY OSTEOARTHRITIS OF RIGHT KNEE: Primary | ICD-10-CM

## 2020-01-28 PROCEDURE — 3008F PR BODY MASS INDEX (BMI) DOCUMENTED: ICD-10-PCS | Mod: CPTII,S$GLB,, | Performed by: ORTHOPAEDIC SURGERY

## 2020-01-28 PROCEDURE — 20610 DRAIN/INJ JOINT/BURSA W/O US: CPT | Mod: RT,S$GLB,, | Performed by: ORTHOPAEDIC SURGERY

## 2020-01-28 PROCEDURE — 3079F DIAST BP 80-89 MM HG: CPT | Mod: CPTII,S$GLB,, | Performed by: ORTHOPAEDIC SURGERY

## 2020-01-28 PROCEDURE — 3074F PR MOST RECENT SYSTOLIC BLOOD PRESSURE < 130 MM HG: ICD-10-PCS | Mod: CPTII,S$GLB,, | Performed by: ORTHOPAEDIC SURGERY

## 2020-01-28 PROCEDURE — 20610 LARGE JOINT ASPIRATION/INJECTION: R KNEE: ICD-10-PCS | Mod: RT,S$GLB,, | Performed by: ORTHOPAEDIC SURGERY

## 2020-01-28 PROCEDURE — 99999 PR PBB SHADOW E&M-EST. PATIENT-LVL III: CPT | Mod: PBBFAC,,, | Performed by: ORTHOPAEDIC SURGERY

## 2020-01-28 PROCEDURE — 99214 OFFICE O/P EST MOD 30 MIN: CPT | Mod: 25,S$GLB,, | Performed by: ORTHOPAEDIC SURGERY

## 2020-01-28 PROCEDURE — 99214 PR OFFICE/OUTPT VISIT, EST, LEVL IV, 30-39 MIN: ICD-10-PCS | Mod: 25,S$GLB,, | Performed by: ORTHOPAEDIC SURGERY

## 2020-01-28 PROCEDURE — 3079F PR MOST RECENT DIASTOLIC BLOOD PRESSURE 80-89 MM HG: ICD-10-PCS | Mod: CPTII,S$GLB,, | Performed by: ORTHOPAEDIC SURGERY

## 2020-01-28 PROCEDURE — 3074F SYST BP LT 130 MM HG: CPT | Mod: CPTII,S$GLB,, | Performed by: ORTHOPAEDIC SURGERY

## 2020-01-28 PROCEDURE — 3008F BODY MASS INDEX DOCD: CPT | Mod: CPTII,S$GLB,, | Performed by: ORTHOPAEDIC SURGERY

## 2020-01-28 PROCEDURE — 99999 PR PBB SHADOW E&M-EST. PATIENT-LVL III: ICD-10-PCS | Mod: PBBFAC,,, | Performed by: ORTHOPAEDIC SURGERY

## 2020-01-28 RX ORDER — TRIAMCINOLONE ACETONIDE 40 MG/ML
40 INJECTION, SUSPENSION INTRA-ARTICULAR; INTRAMUSCULAR
Status: DISCONTINUED | OUTPATIENT
Start: 2020-01-28 | End: 2020-01-28 | Stop reason: HOSPADM

## 2020-01-28 RX ADMIN — TRIAMCINOLONE ACETONIDE 40 MG: 40 INJECTION, SUSPENSION INTRA-ARTICULAR; INTRAMUSCULAR at 04:01

## 2020-01-28 NOTE — PROGRESS NOTES
Past Medical History:   Diagnosis Date    Diabetes mellitus     type 2    DVT (deep venous thrombosis) 2011    Approx. 2011, after having a bunion removed. pt was on warfarin for approx. 6 weeks. unsure of time    Sickle cell anemia     pt states all his life    Sleep apnea     USES CPAP    Wears glasses        Past Surgical History:   Procedure Laterality Date    BUNIONECTOMY Left 2006    Pt developed a blood clot in leg, after having bunion removed. Had to do coumadin for approx. 6 weeks.    BUNIONECTOMY Left 2010    complicated by dvt    COLONOSCOPY N/A 11/17/2016    Procedure: COLONOSCOPY;  Surgeon: Keegan Vo MD;  Location: [facility];  Service: Endoscopy;  Laterality: N/A;    KNEE SURGERY      Patient does not recall which side    SHOULDER SURGERY Right     VASECTOMY Bilateral 2011       Current Outpatient Medications   Medication Sig    alcohol swabs (ALCOHOL PREP PADS) PadM Apply 1 each topically 2 (two) times daily.    atorvastatin (LIPITOR) 20 MG tablet Take 1 tablet (20 mg total) by mouth once daily.    blood sugar diagnostic Strp 1 strip by Misc.(Non-Drug; Combo Route) route 2 (two) times daily.    ferrous sulfate (FEOSOL) 325 mg (65 mg iron) Tab tablet Take 1 tablet (325 mg total) by mouth once daily.    lancets Misc 1 each by Misc.(Non-Drug; Combo Route) route 2 (two) times daily.    lancing device (BD LANCET DEVICE) Misc 1 each by Misc.(Non-Drug; Combo Route) route 2 (two) times daily.    losartan (COZAAR) 25 MG tablet Take 1 tablet (25 mg total) by mouth once daily.    metFORMIN (GLUCOPHAGE) 500 MG tablet Take 1 tablet (500 mg total) by mouth daily with breakfast.     No current facility-administered medications for this visit.        Review of patient's allergies indicates:  No Known Allergies    Family History   Problem Relation Age of Onset    Heart block Mother     Kidney disease Mother     Cancer Paternal Aunt        Social History     Socioeconomic History     Marital status: Single     Spouse name: Not on file    Number of children: Not on file    Years of education: Not on file    Highest education level: Not on file   Occupational History    Not on file   Social Needs    Financial resource strain: Not on file    Food insecurity:     Worry: Not on file     Inability: Not on file    Transportation needs:     Medical: Not on file     Non-medical: Not on file   Tobacco Use    Smoking status: Former Smoker     Packs/day: 0.25     Years: 0.50     Pack years: 0.12    Smokeless tobacco: Never Used   Substance and Sexual Activity    Alcohol use: Yes     Alcohol/week: 1.0 standard drinks     Types: 1 Cans of beer per week    Drug use: No    Sexual activity: Yes   Lifestyle    Physical activity:     Days per week: Not on file     Minutes per session: Not on file    Stress: Not on file   Relationships    Social connections:     Talks on phone: Not on file     Gets together: Not on file     Attends Synagogue service: Not on file     Active member of club or organization: Not on file     Attends meetings of clubs or organizations: Not on file     Relationship status: Not on file   Other Topics Concern    Not on file   Social History Narrative    Not on file       Chief Complaint:   Chief Complaint   Patient presents with    Right Knee - Pain       Consulting Physician: No ref. provider found    History of present illness:    This is a 54 y.o. year old male who complains of right knee pain that he has had for years that is recently worse.  He puts his pain up to 7/10 worse with activities.  He does a lot of bending and walking and this is painful for him.  He has a varus thrust to his knee when he walks.  He thinks he had surgery on this knee many years ago.    Review of Systems:    Constitution:   Denies chills, fever, and sweats.  HENT:   Denies headaches or blurry vision.  Cardiovascular:  Denies chest pain or irregular heart beat.  Respiratory:   Denies cough or  "shortness of breath.  Gastrointestinal:  Denies abdominal pain, nausea, or vomiting.  Musculoskeletal:   Denies muscle cramps.  Neurological:   Denies dizziness or focal weakness.  Psychiatric/Behavior: Normal mental status.  Hematology/Lymph:  Denies bleeding problem or easy bruising/bleeding.  Skin:    Denies rash or suspicious lesions.    Examination:    Vital Signs:    Vitals:    01/28/20 1551   BP: 121/81   Pulse: 89   Weight: 121.1 kg (267 lb)   Height: 5' 7" (1.702 m)   PainSc:   7       Body mass index is 41.82 kg/m².    Constitution:   Well-developed, well nourished patient in no acute distress.  Neurological:   Alert and oriented x 3 and cooperative to examination.     Psychiatric/Behavior: Normal mental status.  Respiratory:   No shortness of breath.  Eyes:    Extraoccular muscles intact  Skin:    No scars, rash or suspicious lesions.    Physical Exam: Right Knee Exam    Gait   Antalgic    Skin  Rash:   None  Scars:   None    Inspection  Erythema:  None  Bruising:  None  Effusion:  None  Masses:  None  Lymphadenopathy: None    Range of Motion: 0 to 130°    Medial Joint : Yes  Lateral Joint : No    Patellofemoral Tenderness: Yes  Patellofemoral Crepitus: Yes    Lachman:  Normal  Anterior Drawer: Normal  Posterior Drawer: Normal    Nate's:  Negative  Apley's:  Negative    Varus Stress:  Stable  Valgus Stress:  Stable    Strength:  5/5    Pulses:  Palpable  Sensation:  Intact          Imaging: X-rays ordered and images interpreted today personally by me of right knee shows moderate to severe degenerative changes.         Assessment: Primary osteoarthritis of right knee  -     Large Joint Aspiration/Injection: R knee        Plan:  We discussed treatment options. He is considering a total knee arthroplasty but in the meantime he would like some relief.  We given him a steroid injection today and will set him up for Euflexxa series.  We also given him a brace today to stabilize and for " his thrust.        DISCLAIMER: This note may have been dictated using voice recognition software and may contain grammatical errors.     NOTE: Consult report sent to referring provider via [facility] [employer].

## 2020-01-28 NOTE — PROCEDURES
Large Joint Aspiration/Injection: R knee  Date/Time: 1/28/2020 4:15 PM  Performed by: Ezekiel Schaefer MD  Authorized by: Ezekiel Schaefer MD     Consent Done?:  Yes (Verbal)  Indications:  Pain  Timeout: Prior to procedure the correct patient, procedure, and site was verified      Location:  Knee  Site:  R knee  Prep: Patient was prepped and draped in usual sterile fashion    Approach:  Anteromedial  Medications:  40 mg triamcinolone acetonide 40 mg/mL

## 2020-02-11 ENCOUNTER — OFFICE VISIT (OUTPATIENT)
Dept: ORTHOPEDICS | Facility: CLINIC | Age: 55
End: 2020-02-11
Payer: COMMERCIAL

## 2020-02-11 DIAGNOSIS — M17.11 PRIMARY OSTEOARTHRITIS OF RIGHT KNEE: Primary | ICD-10-CM

## 2020-02-11 PROCEDURE — 20610 DRAIN/INJ JOINT/BURSA W/O US: CPT | Mod: RT,S$GLB,, | Performed by: ORTHOPAEDIC SURGERY

## 2020-02-11 PROCEDURE — 99499 UNLISTED E&M SERVICE: CPT | Mod: S$GLB,,, | Performed by: ORTHOPAEDIC SURGERY

## 2020-02-11 PROCEDURE — 20610 LARGE JOINT ASPIRATION/INJECTION: R KNEE: ICD-10-PCS | Mod: RT,S$GLB,, | Performed by: ORTHOPAEDIC SURGERY

## 2020-02-11 PROCEDURE — 99499 NO LOS: ICD-10-PCS | Mod: S$GLB,,, | Performed by: ORTHOPAEDIC SURGERY

## 2020-02-11 PROCEDURE — 99999 PR PBB SHADOW E&M-EST. PATIENT-LVL II: ICD-10-PCS | Mod: PBBFAC,,, | Performed by: ORTHOPAEDIC SURGERY

## 2020-02-11 PROCEDURE — 99999 PR PBB SHADOW E&M-EST. PATIENT-LVL II: CPT | Mod: PBBFAC,,, | Performed by: ORTHOPAEDIC SURGERY

## 2020-02-11 NOTE — PROGRESS NOTES
Past Medical History:   Diagnosis Date    Diabetes mellitus     type 2    DVT (deep venous thrombosis) 2011    Approx. 2011, after having a bunion removed. pt was on warfarin for approx. 6 weeks. unsure of time    Sickle cell anemia     pt states all his life    Sleep apnea     USES CPAP    Wears glasses        Past Surgical History:   Procedure Laterality Date    BUNIONECTOMY Left 2006    Pt developed a blood clot in leg, after having bunion removed. Had to do coumadin for approx. 6 weeks.    BUNIONECTOMY Left 2010    complicated by dvt    COLONOSCOPY N/A 11/17/2016    Procedure: COLONOSCOPY;  Surgeon: Keegan Vo MD;  Location: [facility];  Service: Endoscopy;  Laterality: N/A;    KNEE SURGERY      Patient does not recall which side    SHOULDER SURGERY Right     VASECTOMY Bilateral 2011       Current Outpatient Medications   Medication Sig    alcohol swabs (ALCOHOL PREP PADS) PadM Apply 1 each topically 2 (two) times daily.    atorvastatin (LIPITOR) 20 MG tablet Take 1 tablet (20 mg total) by mouth once daily.    blood sugar diagnostic Strp 1 strip by Misc.(Non-Drug; Combo Route) route 2 (two) times daily.    ferrous sulfate (FEOSOL) 325 mg (65 mg iron) Tab tablet Take 1 tablet (325 mg total) by mouth once daily.    lancets Misc 1 each by Misc.(Non-Drug; Combo Route) route 2 (two) times daily.    lancing device (BD LANCET DEVICE) Misc 1 each by Misc.(Non-Drug; Combo Route) route 2 (two) times daily.    losartan (COZAAR) 25 MG tablet Take 1 tablet (25 mg total) by mouth once daily.    metFORMIN (GLUCOPHAGE) 500 MG tablet Take 1 tablet (500 mg total) by mouth daily with breakfast.     No current facility-administered medications for this visit.        Review of patient's allergies indicates:  No Known Allergies    Family History   Problem Relation Age of Onset    Heart block Mother     Kidney disease Mother     Cancer Paternal Aunt        Social History     Socioeconomic History     Marital status: Single     Spouse name: Not on file    Number of children: Not on file    Years of education: Not on file    Highest education level: Not on file   Occupational History    Not on file   Social Needs    Financial resource strain: Not on file    Food insecurity:     Worry: Not on file     Inability: Not on file    Transportation needs:     Medical: Not on file     Non-medical: Not on file   Tobacco Use    Smoking status: Former Smoker     Packs/day: 0.25     Years: 0.50     Pack years: 0.12    Smokeless tobacco: Never Used   Substance and Sexual Activity    Alcohol use: Yes     Alcohol/week: 1.0 standard drinks     Types: 1 Cans of beer per week    Drug use: No    Sexual activity: Yes   Lifestyle    Physical activity:     Days per week: Not on file     Minutes per session: Not on file    Stress: Not on file   Relationships    Social connections:     Talks on phone: Not on file     Gets together: Not on file     Attends [employer] service: Not on file     Active member of club or organization: Not on file     Attends meetings of clubs or organizations: Not on file     Relationship status: Not on file   Other Topics Concern    Not on file   Social History Narrative    Not on file       Chief Complaint:   Chief Complaint   Patient presents with    Knee Pain     right knee Euflexxa 1/3       Consulting Physician: Ezekiel Schaefer MD    History of present illness:    This is a 54 y.o. year old male who complains of right knee pain that he has had for years that is recently worse.  He puts his pain up to 7/10 worse with activities.  He does a lot of bending and walking and this is painful for him.  He has a varus thrust to his knee when he walks.  He thinks he had surgery on this knee many years ago.    Review of Systems:    Constitution:   Denies chills, fever, and sweats.  HENT:   Denies headaches or blurry vision.  Cardiovascular:  Denies chest pain or irregular heart  beat.  Respiratory:   Denies cough or shortness of breath.  Gastrointestinal:  Denies abdominal pain, nausea, or vomiting.  Musculoskeletal:   Denies muscle cramps.  Neurological:   Denies dizziness or focal weakness.  Psychiatric/Behavior: Normal mental status.  Hematology/Lymph:  Denies bleeding problem or easy bruising/bleeding.  Skin:    Denies rash or suspicious lesions.    Examination:    Vital Signs:    There were no vitals filed for this visit.    There is no height or weight on file to calculate BMI.    Constitution:   Well-developed, well nourished patient in no acute distress.  Neurological:   Alert and oriented x 3 and cooperative to examination.     Psychiatric/Behavior: Normal mental status.  Respiratory:   No shortness of breath.  Eyes:    Extraoccular muscles intact  Skin:    No scars, rash or suspicious lesions.    Physical Exam: Right Knee Exam    Gait   Antalgic    Skin  Rash:   None  Scars:   None    Inspection  Erythema:  None  Bruising:  None  Effusion:  None  Masses:  None  Lymphadenopathy: None    Range of Motion: 0 to 130°    Medial Joint : Yes  Lateral Joint : No    Patellofemoral Tenderness: Yes  Patellofemoral Crepitus: Yes    Lachman:  Normal  Anterior Drawer: Normal  Posterior Drawer: Normal    Nate's:  Negative  Apley's:  Negative    Varus Stress:  Stable  Valgus Stress:  Stable    Strength:  5/5    Pulses:  Palpable  Sensation:  Intact          Imaging: X-rays of right knee shows moderate to severe degenerative changes.         Assessment: Primary osteoarthritis of right knee  -     Large Joint Aspiration/Injection: R knee        Plan:   Euflexxa series.      DISCLAIMER: This note may have been dictated using voice recognition software and may contain grammatical errors.     NOTE: Consult report sent to referring provider via [facility].

## 2020-02-11 NOTE — PROCEDURES
Large Joint Aspiration/Injection: R knee  Performed by: Ezekiel Schaefer MD  Authorized by: Ezekiel Schaefer MD  Date/Time: 2/11/2020 8:30 AM    Consent Done?:  Yes (Verbal)  Indications:  pain  Timeout: Immediately prior to procedure a time out was called to verify the correct patient, procedure, equipment, support staff and site/side marked as required.        Details:  Approach: anteromedial  Location:  Knee  Site:  R knee    Medications: 20 mg sodium hyaluronate (EUFLEXXA) 10 mg/mL(mw 2.4 -3.6 million)

## 2020-02-19 ENCOUNTER — OFFICE VISIT (OUTPATIENT)
Dept: ORTHOPEDICS | Facility: CLINIC | Age: 55
End: 2020-02-19
Payer: COMMERCIAL

## 2020-02-19 DIAGNOSIS — M17.11 PRIMARY OSTEOARTHRITIS OF RIGHT KNEE: Primary | ICD-10-CM

## 2020-02-19 PROCEDURE — 99999 PR PBB SHADOW E&M-EST. PATIENT-LVL II: CPT | Mod: PBBFAC,,, | Performed by: ORTHOPAEDIC SURGERY

## 2020-02-19 PROCEDURE — 20610 DRAIN/INJ JOINT/BURSA W/O US: CPT | Mod: RT,S$GLB,, | Performed by: ORTHOPAEDIC SURGERY

## 2020-02-19 PROCEDURE — 20610 LARGE JOINT ASPIRATION/INJECTION: R KNEE: ICD-10-PCS | Mod: RT,S$GLB,, | Performed by: ORTHOPAEDIC SURGERY

## 2020-02-19 PROCEDURE — 99999 PR PBB SHADOW E&M-EST. PATIENT-LVL II: ICD-10-PCS | Mod: PBBFAC,,, | Performed by: ORTHOPAEDIC SURGERY

## 2020-02-19 PROCEDURE — 99499 NO LOS: ICD-10-PCS | Mod: S$GLB,,, | Performed by: ORTHOPAEDIC SURGERY

## 2020-02-19 PROCEDURE — 99499 UNLISTED E&M SERVICE: CPT | Mod: S$GLB,,, | Performed by: ORTHOPAEDIC SURGERY

## 2020-02-19 NOTE — PROGRESS NOTES
Past Medical History:   Diagnosis Date    Diabetes mellitus     type 2    DVT (deep venous thrombosis) 2011    Approx. 2011, after having a bunion removed. pt was on warfarin for approx. 6 weeks. unsure of time    Sickle cell anemia     pt states all his life    Sleep apnea     USES CPAP    Wears glasses        Past Surgical History:   Procedure Laterality Date    BUNIONECTOMY Left 2006    Pt developed a blood clot in leg, after having bunion removed. Had to do coumadin for approx. 6 weeks.    BUNIONECTOMY Left 2010    complicated by dvt    COLONOSCOPY N/A 11/17/2016    Procedure: COLONOSCOPY;  Surgeon: Keegan Vo MD;  Location: [facility];  Service: Endoscopy;  Laterality: N/A;    KNEE SURGERY      Patient does not recall which side    SHOULDER SURGERY Right     VASECTOMY Bilateral 2011       Current Outpatient Medications   Medication Sig    alcohol swabs (ALCOHOL PREP PADS) PadM Apply 1 each topically 2 (two) times daily.    atorvastatin (LIPITOR) 20 MG tablet Take 1 tablet (20 mg total) by mouth once daily.    blood sugar diagnostic Strp 1 strip by Misc.(Non-Drug; Combo Route) route 2 (two) times daily.    ferrous sulfate (FEOSOL) 325 mg (65 mg iron) Tab tablet Take 1 tablet (325 mg total) by mouth once daily.    lancets Misc 1 each by Misc.(Non-Drug; Combo Route) route 2 (two) times daily.    lancing device (BD LANCET DEVICE) Misc 1 each by Misc.(Non-Drug; Combo Route) route 2 (two) times daily.    losartan (COZAAR) 25 MG tablet Take 1 tablet (25 mg total) by mouth once daily.    metFORMIN (GLUCOPHAGE) 500 MG tablet Take 1 tablet (500 mg total) by mouth daily with breakfast.     No current facility-administered medications for this visit.        Review of patient's allergies indicates:  No Known Allergies    Family History   Problem Relation Age of Onset    Heart block Mother     Kidney disease Mother     Cancer Paternal Aunt        Social History     Socioeconomic History     Marital status: Single     Spouse name: Not on file    Number of children: Not on file    Years of education: Not on file    Highest education level: Not on file   Occupational History    Not on file   Social Needs    Financial resource strain: Not on file    Food insecurity:     Worry: Not on file     Inability: Not on file    Transportation needs:     Medical: Not on file     Non-medical: Not on file   Tobacco Use    Smoking status: Former Smoker     Packs/day: 0.25     Years: 0.50     Pack years: 0.12    Smokeless tobacco: Never Used   Substance and Sexual Activity    Alcohol use: Yes     Alcohol/week: 1.0 standard drinks     Types: 1 Cans of beer per week    Drug use: No    Sexual activity: Yes   Lifestyle    Physical activity:     Days per week: Not on file     Minutes per session: Not on file    Stress: Not on file   Relationships    Social connections:     Talks on phone: Not on file     Gets together: Not on file     Attends Orthodoxy service: Not on file     Active member of club or organization: Not on file     Attends meetings of clubs or organizations: Not on file     Relationship status: Not on file   Other Topics Concern    Not on file   Social History Narrative    Not on file       Chief Complaint:   Chief Complaint   Patient presents with    Knee Pain     right knee Euflexxa 2/3       Consulting Physician: No ref. provider found    History of present illness:    This is a 54 y.o. year old male who complains of right knee pain that he has had for years that is recently worse.  He puts his pain up to 7/10 worse with activities.  He does a lot of bending and walking and this is painful for him.  He has a varus thrust to his knee when he walks.  He thinks he had surgery on this knee many years ago.    Review of Systems:    Constitution:   Denies chills, fever, and sweats.  HENT:   Denies headaches or blurry vision.  Cardiovascular:  Denies chest pain or irregular heart  beat.  Respiratory:   Denies cough or shortness of breath.  Gastrointestinal:  Denies abdominal pain, nausea, or vomiting.  Musculoskeletal:   Denies muscle cramps.  Neurological:   Denies dizziness or focal weakness.  Psychiatric/Behavior: Normal mental status.  Hematology/Lymph:  Denies bleeding problem or easy bruising/bleeding.  Skin:    Denies rash or suspicious lesions.    Examination:    Vital Signs:    There were no vitals filed for this visit.    There is no height or weight on file to calculate BMI.    Constitution:   Well-developed, well nourished patient in no acute distress.  Neurological:   Alert and oriented x 3 and cooperative to examination.     Psychiatric/Behavior: Normal mental status.  Respiratory:   No shortness of breath.  Eyes:    Extraoccular muscles intact  Skin:    No scars, rash or suspicious lesions.    Physical Exam: Right Knee Exam    Gait   Antalgic    Skin  Rash:   None  Scars:   None    Inspection  Erythema:  None  Bruising:  None  Effusion:  None  Masses:  None  Lymphadenopathy: None    Range of Motion: 0 to 130°    Medial Joint : Yes  Lateral Joint : No    Patellofemoral Tenderness: Yes  Patellofemoral Crepitus: Yes    Lachman:  Normal  Anterior Drawer: Normal  Posterior Drawer: Normal    Nate's:  Negative  Apley's:  Negative    Varus Stress:  Stable  Valgus Stress:  Stable    Strength:  5/5    Pulses:  Palpable  Sensation:  Intact          Imaging: X-rays of right knee shows moderate to severe degenerative changes.         Assessment: Primary osteoarthritis of right knee  -     Large Joint Aspiration/Injection: R knee        Plan:   Euflexxa series.      DISCLAIMER: This note may have been dictated using voice recognition software and may contain grammatical errors.     NOTE: Consult report sent to referring provider via [facility].

## 2020-02-19 NOTE — PROCEDURES
Large Joint Aspiration/Injection: R knee  Performed by: Ezekiel Schaefer MD  Authorized by: Ezekiel Schaefer MD  Date/Time: 2/19/2020 8:30 AM    Consent Done?:  Yes (Verbal)  Indications:  pain  Timeout: Immediately prior to procedure a time out was called to verify the correct patient, procedure, equipment, support staff and site/side marked as required.        Details:  Approach: anteromedial  Location:  Knee  Site:  R knee    Medications: 20 mg sodium hyaluronate (EUFLEXXA) 10 mg/mL(mw 2.4 -3.6 million)

## 2020-03-02 ENCOUNTER — OFFICE VISIT (OUTPATIENT)
Dept: ORTHOPEDICS | Facility: CLINIC | Age: 55
End: 2020-03-02
Payer: COMMERCIAL

## 2020-03-02 VITALS — HEIGHT: 67 IN | BODY MASS INDEX: 41.91 KG/M2 | WEIGHT: 267 LBS

## 2020-03-02 DIAGNOSIS — M17.11 PRIMARY OSTEOARTHRITIS OF RIGHT KNEE: Primary | ICD-10-CM

## 2020-03-02 PROCEDURE — 99999 PR PBB SHADOW E&M-EST. PATIENT-LVL II: CPT | Mod: PBBFAC,,, | Performed by: ORTHOPAEDIC SURGERY

## 2020-03-02 PROCEDURE — 99499 NO LOS: ICD-10-PCS | Mod: S$GLB,,, | Performed by: ORTHOPAEDIC SURGERY

## 2020-03-02 PROCEDURE — 99999 PR PBB SHADOW E&M-EST. PATIENT-LVL II: ICD-10-PCS | Mod: PBBFAC,,, | Performed by: ORTHOPAEDIC SURGERY

## 2020-03-02 PROCEDURE — 99499 UNLISTED E&M SERVICE: CPT | Mod: S$GLB,,, | Performed by: ORTHOPAEDIC SURGERY

## 2020-03-02 PROCEDURE — 20610 LARGE JOINT ASPIRATION/INJECTION: R KNEE: ICD-10-PCS | Mod: RT,S$GLB,, | Performed by: ORTHOPAEDIC SURGERY

## 2020-03-02 PROCEDURE — 20610 DRAIN/INJ JOINT/BURSA W/O US: CPT | Mod: RT,S$GLB,, | Performed by: ORTHOPAEDIC SURGERY

## 2020-03-06 NOTE — PROGRESS NOTES
Past Medical History:   Diagnosis Date    Diabetes mellitus     type 2    DVT (deep venous thrombosis) 2011    Approx. 2011, after having a bunion removed. pt was on warfarin for approx. 6 weeks. unsure of time    Sickle cell anemia     pt states all his life    Sleep apnea     USES CPAP    Wears glasses        Past Surgical History:   Procedure Laterality Date    BUNIONECTOMY Left 2006    Pt developed a blood clot in leg, after having bunion removed. Had to do coumadin for approx. 6 weeks.    BUNIONECTOMY Left 2010    complicated by dvt    COLONOSCOPY N/A 11/17/2016    Procedure: COLONOSCOPY;  Surgeon: Keegan Vo MD;  Location: [facility];  Service: Endoscopy;  Laterality: N/A;    KNEE SURGERY      Patient does not recall which side    SHOULDER SURGERY Right     VASECTOMY Bilateral 2011       Current Outpatient Medications   Medication Sig    alcohol swabs (ALCOHOL PREP PADS) PadM Apply 1 each topically 2 (two) times daily.    atorvastatin (LIPITOR) 20 MG tablet Take 1 tablet (20 mg total) by mouth once daily.    blood sugar diagnostic Strp 1 strip by Misc.(Non-Drug; Combo Route) route 2 (two) times daily.    ferrous sulfate (FEOSOL) 325 mg (65 mg iron) Tab tablet Take 1 tablet (325 mg total) by mouth once daily.    lancets Misc 1 each by Misc.(Non-Drug; Combo Route) route 2 (two) times daily.    lancing device (BD LANCET DEVICE) Misc 1 each by Misc.(Non-Drug; Combo Route) route 2 (two) times daily.    losartan (COZAAR) 25 MG tablet Take 1 tablet (25 mg total) by mouth once daily.    metFORMIN (GLUCOPHAGE) 500 MG tablet Take 1 tablet (500 mg total) by mouth daily with breakfast.     No current facility-administered medications for this visit.        Review of patient's allergies indicates:  No Known Allergies    Family History   Problem Relation Age of Onset    Heart block Mother     Kidney disease Mother     Cancer Paternal Aunt        Social History     Socioeconomic History     Marital status: Single     Spouse name: Not on file    Number of children: Not on file    Years of education: Not on file    Highest education level: Not on file   Occupational History    Not on file   Social Needs    Financial resource strain: Not on file    Food insecurity:     Worry: Not on file     Inability: Not on file    Transportation needs:     Medical: Not on file     Non-medical: Not on file   Tobacco Use    Smoking status: Former Smoker     Packs/day: 0.25     Years: 0.50     Pack years: 0.12    Smokeless tobacco: Never Used   Substance and Sexual Activity    Alcohol use: Yes     Alcohol/week: 1.0 standard drinks     Types: 1 Cans of beer per week    Drug use: No    Sexual activity: Yes   Lifestyle    Physical activity:     Days per week: Not on file     Minutes per session: Not on file    Stress: Not on file   Relationships    Social connections:     Talks on phone: Not on file     Gets together: Not on file     Attends [employer] service: Not on file     Active member of club or organization: Not on file     Attends meetings of clubs or organizations: Not on file     Relationship status: Not on file   Other Topics Concern    Not on file   Social History Narrative    Not on file       Chief Complaint:   Chief Complaint   Patient presents with    Knee Pain     right knee Euflexxa 3/3       Consulting Physician: No ref. provider found    History of present illness:    This is a 54 y.o. year old male who complains of right knee pain that he has had for years that is recently worse.  He puts his pain up to 7/10 worse with activities.  He does a lot of bending and walking and this is painful for him.  He has a varus thrust to his knee when he walks.  He thinks he had surgery on this knee many years ago.    Review of Systems:    Constitution:   Denies chills, fever, and sweats.  HENT:   Denies headaches or blurry vision.  Cardiovascular:  Denies chest pain or irregular heart  "beat.  Respiratory:   Denies cough or shortness of breath.  Gastrointestinal:  Denies abdominal pain, nausea, or vomiting.  Musculoskeletal:   Denies muscle cramps.  Neurological:   Denies dizziness or focal weakness.  Psychiatric/Behavior: Normal mental status.  Hematology/Lymph:  Denies bleeding problem or easy bruising/bleeding.  Skin:    Denies rash or suspicious lesions.    Examination:    Vital Signs:    Vitals:    03/02/20 0815   Weight: 121.1 kg (267 lb)   Height: 5' 7" (1.702 m)   PainSc:   4   PainLoc: Knee       Body mass index is 41.82 kg/m².    Constitution:   Well-developed, well nourished patient in no acute distress.  Neurological:   Alert and oriented x 3 and cooperative to examination.     Psychiatric/Behavior: Normal mental status.  Respiratory:   No shortness of breath.  Eyes:    Extraoccular muscles intact  Skin:    No scars, rash or suspicious lesions.    Physical Exam: Right Knee Exam    Gait   Antalgic    Skin  Rash:   None  Scars:   None    Inspection  Erythema:  None  Bruising:  None  Effusion:  None  Masses:  None  Lymphadenopathy: None    Range of Motion: 0 to 130°    Medial Joint : Yes  Lateral Joint : No    Patellofemoral Tenderness: Yes  Patellofemoral Crepitus: Yes    Lachman:  Normal  Anterior Drawer: Normal  Posterior Drawer: Normal    Nate's:  Negative  Apley's:  Negative    Varus Stress:  Stable  Valgus Stress:  Stable    Strength:  5/5    Pulses:  Palpable  Sensation:  Intact          Imaging: X-rays of right knee shows moderate to severe degenerative changes.         Assessment: Primary osteoarthritis of right knee  -     Large Joint Aspiration/Injection: R knee        Plan:   Euflexxa series.      DISCLAIMER: This note may have been dictated using voice recognition software and may contain grammatical errors.     NOTE: Consult report sent to referring provider via [facility] EMR.  "

## 2020-03-06 NOTE — PROCEDURES
Large Joint Aspiration/Injection: R knee  Performed by: Ezekiel Schaefer MD  Authorized by: Ezekiel Schaefer MD  Date/Time: 3/2/2020 8:30 AM    Consent Done?:  Yes (Verbal)  Indications:  pain  Timeout: Immediately prior to procedure a time out was called to verify the correct patient, procedure, equipment, support staff and site/side marked as required.        Details:  Approach: anteromedial  Location:  Knee  Site:  R knee    Medications: 20 mg sodium hyaluronate (EUFLEXXA) 10 mg/mL(mw 2.4 -3.6 million)

## 2020-05-14 PROBLEM — R73.9 HEMOGLOBIN A1C BETWEEN 7.0% AND 9.0%: Status: ACTIVE | Noted: 2017-08-07

## 2020-05-18 ENCOUNTER — HOSPITAL ENCOUNTER (OUTPATIENT)
Dept: RADIOLOGY | Facility: HOSPITAL | Age: 55
Discharge: HOME | End: 2020-05-18
Attending: ORTHOPAEDIC SURGERY
Payer: COMMERCIAL

## 2020-05-18 ENCOUNTER — OFFICE VISIT (OUTPATIENT)
Dept: ORTHOPEDICS | Facility: CLINIC | Age: 55
End: 2020-05-18
Payer: COMMERCIAL

## 2020-05-18 VITALS — TEMPERATURE: 97 F | HEIGHT: 67 IN | WEIGHT: 267 LBS | BODY MASS INDEX: 41.91 KG/M2 | RESPIRATION RATE: 18 BRPM

## 2020-05-18 DIAGNOSIS — M25.512 LEFT SHOULDER PAIN, UNSPECIFIED CHRONICITY: ICD-10-CM

## 2020-05-18 DIAGNOSIS — M17.11 PRIMARY OSTEOARTHRITIS OF RIGHT KNEE: ICD-10-CM

## 2020-05-18 DIAGNOSIS — M17.11 PRIMARY OSTEOARTHRITIS OF RIGHT KNEE: Primary | ICD-10-CM

## 2020-05-18 DIAGNOSIS — M25.561 RIGHT KNEE PAIN, UNSPECIFIED CHRONICITY: Primary | ICD-10-CM

## 2020-05-18 DIAGNOSIS — M25.512 ACUTE PAIN OF LEFT SHOULDER: ICD-10-CM

## 2020-05-18 DIAGNOSIS — M25.512 LEFT SHOULDER PAIN, UNSPECIFIED CHRONICITY: Primary | ICD-10-CM

## 2020-05-18 PROCEDURE — 99999 PR PBB SHADOW E&M-EST. PATIENT-LVL III: CPT | Mod: PBBFAC,,, | Performed by: ORTHOPAEDIC SURGERY

## 2020-05-18 PROCEDURE — 73564 X-RAY EXAM KNEE 4 OR MORE: CPT | Mod: 26,RT,, | Performed by: RADIOLOGY

## 2020-05-18 PROCEDURE — 3008F BODY MASS INDEX DOCD: CPT | Mod: CPTII,S$GLB,, | Performed by: ORTHOPAEDIC SURGERY

## 2020-05-18 PROCEDURE — 99214 PR OFFICE/OUTPT VISIT, EST, LEVL IV, 30-39 MIN: ICD-10-PCS | Mod: 25,S$GLB,, | Performed by: ORTHOPAEDIC SURGERY

## 2020-05-18 PROCEDURE — 73562 X-RAY EXAM OF KNEE 3: CPT | Mod: 26,LT,, | Performed by: RADIOLOGY

## 2020-05-18 PROCEDURE — 99999 PR PBB SHADOW E&M-EST. PATIENT-LVL III: ICD-10-PCS | Mod: PBBFAC,,, | Performed by: ORTHOPAEDIC SURGERY

## 2020-05-18 PROCEDURE — 73030 X-RAY EXAM OF SHOULDER: CPT | Mod: 26,LT,, | Performed by: RADIOLOGY

## 2020-05-18 PROCEDURE — 3008F PR BODY MASS INDEX (BMI) DOCUMENTED: ICD-10-PCS | Mod: CPTII,S$GLB,, | Performed by: ORTHOPAEDIC SURGERY

## 2020-05-18 PROCEDURE — 73562 X-RAY EXAM OF KNEE 3: CPT | Mod: TC,PN,LT

## 2020-05-18 PROCEDURE — 73030 XR SHOULDER TRAUMA 3 VIEW LEFT: ICD-10-PCS | Mod: 26,LT,, | Performed by: RADIOLOGY

## 2020-05-18 PROCEDURE — 73564 XR KNEE ORTHO RIGHT WITH FLEXION: ICD-10-PCS | Mod: 26,RT,, | Performed by: RADIOLOGY

## 2020-05-18 PROCEDURE — 73562 XR KNEE ORTHO RIGHT WITH FLEXION: ICD-10-PCS | Mod: 26,LT,, | Performed by: RADIOLOGY

## 2020-05-18 PROCEDURE — 99214 OFFICE O/P EST MOD 30 MIN: CPT | Mod: 25,S$GLB,, | Performed by: ORTHOPAEDIC SURGERY

## 2020-05-18 PROCEDURE — 20610 DRAIN/INJ JOINT/BURSA W/O US: CPT | Mod: LT,S$GLB,, | Performed by: ORTHOPAEDIC SURGERY

## 2020-05-18 PROCEDURE — 73030 X-RAY EXAM OF SHOULDER: CPT | Mod: TC,PN,LT

## 2020-05-18 PROCEDURE — 20610 LARGE JOINT ASPIRATION/INJECTION: L SUBACROMIAL BURSA: ICD-10-PCS | Mod: LT,S$GLB,, | Performed by: ORTHOPAEDIC SURGERY

## 2020-05-18 RX ORDER — TRAMADOL HYDROCHLORIDE 50 MG/1
50 TABLET ORAL EVERY 6 HOURS PRN
Qty: 28 TABLET | Refills: 0 | Status: CANCELLED | OUTPATIENT
Start: 2020-05-18 | End: 2020-05-28

## 2020-05-18 RX ORDER — TRIAMCINOLONE ACETONIDE 40 MG/ML
40 INJECTION, SUSPENSION INTRA-ARTICULAR; INTRAMUSCULAR
Status: DISCONTINUED | OUTPATIENT
Start: 2020-05-18 | End: 2020-05-18 | Stop reason: HOSPADM

## 2020-05-18 RX ADMIN — TRIAMCINOLONE ACETONIDE 40 MG: 40 INJECTION, SUSPENSION INTRA-ARTICULAR; INTRAMUSCULAR at 01:05

## 2020-05-18 NOTE — PROCEDURES
Large Joint Aspiration/Injection: L subacromial bursa  Date/Time: 5/18/2020 1:00 PM  Performed by: Ezekiel Schaefer MD  Authorized by: Ezekiel Schaefer MD     Consent Done?:  Yes (Verbal)  Indications:  Pain  Timeout: prior to procedure the correct patient, procedure, and site was verified    Approach:  Lateral  Location:  Shoulder  Site:  L subacromial bursa  Medications:  40 mg triamcinolone acetonide 40 mg/mL

## 2020-05-18 NOTE — PROGRESS NOTES
Past Medical History:   Diagnosis Date    Diabetes mellitus     type 2    DVT (deep venous thrombosis) 2011    Approx. 2011, after having a bunion removed. pt was on warfarin for approx. 6 weeks. unsure of time    Sickle cell anemia     pt states all his life    Sleep apnea     USES CPAP    Wears glasses        Past Surgical History:   Procedure Laterality Date    BUNIONECTOMY Left 2006    Pt developed a blood clot in leg, after having bunion removed. Had to do coumadin for approx. 6 weeks.    BUNIONECTOMY Left 2010    complicated by dvt    COLONOSCOPY N/A 11/17/2016    Procedure: COLONOSCOPY;  Surgeon: Keegan Vo MD;  Location: [facility];  Service: Endoscopy;  Laterality: N/A;    KNEE SURGERY      Patient does not recall which side    SHOULDER SURGERY Right     VASECTOMY Bilateral 2011       Current Outpatient Medications   Medication Sig    alcohol swabs (ALCOHOL PREP PADS) PadM Apply 1 each topically 2 (two) times daily.    atorvastatin (LIPITOR) 20 MG tablet Take 1 tablet (20 mg total) by mouth once daily.    blood sugar diagnostic Strp 1 strip by Misc.(Non-Drug; Combo Route) route 2 (two) times daily.    ferrous sulfate (FEOSOL) 325 mg (65 mg iron) Tab tablet Take 1 tablet (325 mg total) by mouth once daily.    lancets Misc 1 each by Misc.(Non-Drug; Combo Route) route 2 (two) times daily.    lancing device (BD LANCET DEVICE) Misc 1 each by Misc.(Non-Drug; Combo Route) route 2 (two) times daily.    losartan (COZAAR) 25 MG tablet Take 1 tablet (25 mg total) by mouth once daily.    metFORMIN (GLUCOPHAGE) 500 MG tablet Take 1 tablet (500 mg total) by mouth 2 (two) times daily with meals.    TURMERIC ORAL Take by mouth.     No current facility-administered medications for this visit.        Review of patient's allergies indicates:  No Known Allergies    Family History   Problem Relation Age of Onset    Heart block Mother     Kidney disease Mother     Cancer Paternal Aunt        Social  History     Socioeconomic History    Marital status: Single     Spouse name: Not on file    Number of children: Not on file    Years of education: Not on file    Highest education level: Not on file   Occupational History    Not on file   Social Needs    Financial resource strain: Not on file    Food insecurity:     Worry: Not on file     Inability: Not on file    Transportation needs:     Medical: Not on file     Non-medical: Not on file   Tobacco Use    Smoking status: Former Smoker     Packs/day: 0.25     Years: 0.50     Pack years: 0.12    Smokeless tobacco: Never Used   Substance and Sexual Activity    Alcohol use: Yes     Alcohol/week: 1.0 standard drinks     Types: 1 Cans of beer per week    Drug use: No    Sexual activity: Yes   Lifestyle    Physical activity:     Days per week: Not on file     Minutes per session: Not on file    Stress: Not on file   Relationships    Social connections:     Talks on phone: Not on file     Gets together: Not on file     Attends Baptism service: Not on file     Active member of club or organization: Not on file     Attends meetings of clubs or organizations: Not on file     Relationship status: Not on file   Other Topics Concern    Not on file   Social History Narrative    Not on file       Chief Complaint:   Chief Complaint   Patient presents with    Right Knee - Pain       Consulting Physician: No ref. provider found    History of present illness:    This is a 54 y.o. year old male who complains of right knee pain that he has had for years that is recently worse.  He puts his pain up to 6/10 worse with activities.  He does a lot of bending and walking and this is painful for him.  He has a varus thrust to his knee when he walks.  He also reports left shoulder pain that he has had for several weeks since he cut someone that was falling.  He describes the pain at a 6/10 and worse when he does overhead activities.    Review of Systems:    Constitution:  "  Denies chills, fever, and sweats.  HENT:   Denies headaches or blurry vision.  Cardiovascular:  Denies chest pain or irregular heart beat.  Respiratory:   Denies cough or shortness of breath.  Gastrointestinal:  Denies abdominal pain, nausea, or vomiting.  Musculoskeletal:   Denies muscle cramps.  Neurological:   Denies dizziness or focal weakness.  Psychiatric/Behavior: Normal mental status.  Hematology/Lymph:  Denies bleeding problem or easy bruising/bleeding.  Skin:    Denies rash or suspicious lesions.    Examination:    Vital Signs:    Vitals:    05/18/20 1313   Resp: 18   Temp: 96.9 °F (36.1 °C)   Weight: 121.1 kg (267 lb)   Height: 5' 7" (1.702 m)   PainSc:   6       Body mass index is 41.82 kg/m².    Constitution:   Well-developed, well nourished patient in no acute distress.  Neurological:   Alert and oriented x 3 and cooperative to examination.     Psychiatric/Behavior: Normal mental status.  Respiratory:   No shortness of breath.  Eyes:    Extraoccular muscles intact  Skin:    No scars, rash or suspicious lesions.    Physical Exam: Right Knee Exam    Gait   Antalgic    Skin  Rash:   None  Scars:   None    Inspection  Erythema:  None  Bruising:  None  Effusion:  None  Masses:  None  Lymphadenopathy: None    Range of Motion: 0 to 130°    Medial Joint : Yes  Lateral Joint : No    Patellofemoral Tenderness: Yes  Patellofemoral Crepitus: Yes    Lachman:  Normal  Anterior Drawer: Normal  Posterior Drawer: Normal    Nate's:  Negative  Apley's:  Negative    Varus Stress:  Stable  Valgus Stress:  Stable    Strength:  5/5    Pulses:  Palpable  Sensation:  Intact    Left Shoulder Exam     Skin  Rash:   None  Scars:   None    Inspection/Observation   Swelling:   none  Erythema:   none  Bruising:   none  Wounds:   none  Scapular Winging:  none  Scapular Dyskinesia:  mild  Atrophy:   none  Masses:   None  Lymphadenopathy: None    Tenderness   AC Joint:   Mild  Bicep groove:  Mild    Range of " Motion   Active Forward Flexion:  160  Active External Rotation:  30  Active Internal Rotation:  Hip pocket    Muscle Strength   Forward Flexion:  5/5  External Rotation:  5/5  Internal Rotation:  5/5    Tests & Signs   Cross Arm:   negative  Hawkin's test:   positive  Impingement:   positive    Other   Sensation:  normal  Pulse:    2+ radial            Imaging: X-rays of right knee shows severe medial degenerative changes.  X-rays of the left shoulder appear normal with mild wear.       Assessment: Primary osteoarthritis of right knee    Acute pain of left shoulder  -     Large Joint Aspiration/Injection: L subacromial bursa        Plan:  We injected his left shoulder today for a rotator cuff strain.  At this point he is interested in a right total knee replacement.  The previous injections helped a little bit about have worn off.  We discussed options and he would like to proceed with a knee replacement.  We discussed the risk and benefits including the potential for incomplete pain relief.  Will also call in a prescription for Ultram. After discussing the diagnosis and reviewing treatment options, the patient/family elected to proceed with surgical intervention.    In preparation for surgery:    A pre-operative clearance request was placed with primary care physician.    Appropriate pre-operative labs were ordered.    An EKG examination was ordered.    A chest X-ray was ordered.    A pre-operative planning MRI study was ordered.    Pertinent risk factors and comorbidities for surgery were identified and reviewed, including DM, sickle cell disease and previous DVT    Pre-operative antibiotics were ordered.    The risks, benefits, and alternatives to the procedure were explained to the patient/family including, but not limited to: incomplete pain relief, surgical failure, hardware failure, need for further procedures, damage to nerves, arteries, blood vessels and other structures, infection, Deep Vein Thrombosis  (DVT), Pulmonary Embolus (PE), Complex Regional Pain Syndrome as well as general anesthetic complications including seizure, stroke, heart attack and even death. The patient/family understood these risks and wished to proceed and signed the informed consent. All questions were answered. No guarantees were implied or stated.    We discussed COVID19 with the patient. They are aware of our current policies and procedures, were given the option of delaying surgery, and they elected to proceed.    We will obtain the necessary clearances and schedule the patient for surgery.            DISCLAIMER: This note may have been dictated using voice recognition software and may contain grammatical errors.     NOTE: Consult report sent to referring provider via [facility] EMR.

## 2020-05-19 RX ORDER — TRAMADOL HYDROCHLORIDE 50 MG/1
50 TABLET ORAL EVERY 6 HOURS PRN
Qty: 30 TABLET | Refills: 0 | Status: ON HOLD | OUTPATIENT
Start: 2020-05-19 | End: 2020-12-23 | Stop reason: CLARIF

## 2020-05-20 RX ORDER — MUPIROCIN 20 MG/G
OINTMENT TOPICAL
Status: CANCELLED | OUTPATIENT
Start: 2020-05-20

## 2020-05-20 RX ORDER — SODIUM CHLORIDE 9 MG/ML
INJECTION, SOLUTION INTRAVENOUS CONTINUOUS
Status: CANCELLED | OUTPATIENT
Start: 2020-05-20

## 2020-05-22 ENCOUNTER — TELEPHONE (OUTPATIENT)
Dept: ORTHOPEDICS | Facility: CLINIC | Age: 55
End: 2020-05-22

## 2020-05-22 NOTE — TELEPHONE ENCOUNTER
Peer to peer was done today with Dr Schaefer. Patients insurance denied coverage of the MRI of his knee. Spoke to patient, advised insurance denied his MRI will need to cancel his surgery scheduled for 7/1 with Dr Schaefer. Advised if he would like to still have surgery we can refer him to another orthopedic doctor within [facility]. Pt verbalized understanding, will think about it and call us back.

## 2020-05-22 NOTE — TELEPHONE ENCOUNTER
Returned call. Patient requested for his office notes and xray reports pertaining to his right knee to be faxed over to Dr. Jeter at [phone] in an attempt to receive insurance approval for Right TKA. Requested documents have been faxed accordingly, per patient's request.

## 2020-05-22 NOTE — TELEPHONE ENCOUNTER
----- Message from Ankit Maxwell sent at 5/22/2020  3:46 PM CDT -----  Contact: pt   Want to speak to you again about medical records

## 2020-06-25 ENCOUNTER — HOSPITAL ENCOUNTER (OUTPATIENT)
Dept: RADIOLOGY | Facility: HOSPITAL | Age: 55
Discharge: HOME | End: 2020-06-25
Attending: NURSE PRACTITIONER
Payer: COMMERCIAL

## 2020-06-25 DIAGNOSIS — Z01.818 PREOPERATIVE CLEARANCE: ICD-10-CM

## 2020-06-25 DIAGNOSIS — R10.84 GENERALIZED ABDOMINAL PAIN: ICD-10-CM

## 2020-06-25 PROCEDURE — 76700 US EXAM ABDOM COMPLETE: CPT | Mod: 26,,, | Performed by: RADIOLOGY

## 2020-06-25 PROCEDURE — 71046 XR CHEST PA AND LATERAL: ICD-10-PCS | Mod: 26,,, | Performed by: RADIOLOGY

## 2020-06-25 PROCEDURE — 76700 US EXAM ABDOM COMPLETE: CPT | Mod: TC

## 2020-06-25 PROCEDURE — 71046 X-RAY EXAM CHEST 2 VIEWS: CPT | Mod: 26,,, | Performed by: RADIOLOGY

## 2020-06-25 PROCEDURE — 71046 X-RAY EXAM CHEST 2 VIEWS: CPT | Mod: TC,FY

## 2020-06-25 PROCEDURE — 76700 US ABDOMEN COMPLETE: ICD-10-PCS | Mod: 26,,, | Performed by: RADIOLOGY

## 2020-06-30 PROBLEM — K76.0 FATTY INFILTRATION OF LIVER: Status: ACTIVE | Noted: 2020-06-30

## 2020-07-02 ENCOUNTER — LAB VISIT (OUTPATIENT)
Dept: LAB | Facility: HOSPITAL | Age: 55
End: 2020-07-02
Attending: NURSE PRACTITIONER
Payer: COMMERCIAL

## 2020-07-02 DIAGNOSIS — Z01.818 PREOPERATIVE CLEARANCE: ICD-10-CM

## 2020-07-02 LAB
APTT BLDCRRT: 23.3 SEC (ref 21–32)
INR PPP: 1 (ref 0.8–1.2)
PROTHROMBIN TIME: 10.2 SEC (ref 9–12.5)

## 2020-07-02 PROCEDURE — 87081 CULTURE SCREEN ONLY: CPT

## 2020-07-02 PROCEDURE — 85610 PROTHROMBIN TIME: CPT

## 2020-07-02 PROCEDURE — 85730 THROMBOPLASTIN TIME PARTIAL: CPT

## 2020-07-02 PROCEDURE — 36415 COLL VENOUS BLD VENIPUNCTURE: CPT

## 2020-07-04 LAB — MRSA SPEC QL CULT: NORMAL

## 2020-12-03 ENCOUNTER — OFFICE VISIT (OUTPATIENT)
Dept: GASTROENTEROLOGY | Facility: CLINIC | Age: 55
End: 2020-12-03
Payer: COMMERCIAL

## 2020-12-03 VITALS — HEIGHT: 67 IN | WEIGHT: 266.13 LBS | BODY MASS INDEX: 41.77 KG/M2

## 2020-12-03 DIAGNOSIS — K76.0 FATTY LIVER: ICD-10-CM

## 2020-12-03 DIAGNOSIS — D50.9 IRON DEFICIENCY ANEMIA, UNSPECIFIED IRON DEFICIENCY ANEMIA TYPE: Primary | ICD-10-CM

## 2020-12-03 DIAGNOSIS — K21.9 GASTROESOPHAGEAL REFLUX DISEASE, UNSPECIFIED WHETHER ESOPHAGITIS PRESENT: ICD-10-CM

## 2020-12-03 DIAGNOSIS — Z86.010 HISTORY OF COLON POLYPS: ICD-10-CM

## 2020-12-03 DIAGNOSIS — R19.5 DARK STOOLS: ICD-10-CM

## 2020-12-03 PROCEDURE — 99204 PR OFFICE/OUTPT VISIT, NEW, LEVL IV, 45-59 MIN: ICD-10-PCS | Mod: S$GLB,,, | Performed by: NURSE PRACTITIONER

## 2020-12-03 PROCEDURE — 99204 OFFICE O/P NEW MOD 45 MIN: CPT | Mod: S$GLB,,, | Performed by: NURSE PRACTITIONER

## 2020-12-03 PROCEDURE — 3008F BODY MASS INDEX DOCD: CPT | Mod: CPTII,S$GLB,, | Performed by: NURSE PRACTITIONER

## 2020-12-03 PROCEDURE — 99999 PR PBB SHADOW E&M-EST. PATIENT-LVL IV: ICD-10-PCS | Mod: PBBFAC,,, | Performed by: NURSE PRACTITIONER

## 2020-12-03 PROCEDURE — 99999 PR PBB SHADOW E&M-EST. PATIENT-LVL IV: CPT | Mod: PBBFAC,,, | Performed by: NURSE PRACTITIONER

## 2020-12-03 PROCEDURE — 1126F AMNT PAIN NOTED NONE PRSNT: CPT | Mod: S$GLB,,, | Performed by: NURSE PRACTITIONER

## 2020-12-03 PROCEDURE — 3008F PR BODY MASS INDEX (BMI) DOCUMENTED: ICD-10-PCS | Mod: CPTII,S$GLB,, | Performed by: NURSE PRACTITIONER

## 2020-12-03 PROCEDURE — 1126F PR PAIN SEVERITY QUANTIFIED, NO PAIN PRESENT: ICD-10-PCS | Mod: S$GLB,,, | Performed by: NURSE PRACTITIONER

## 2020-12-03 NOTE — LETTER
December 3, 2020      Lisa Y. Cooksey, STEPHANIE  [address]  [place]  [place] [address]           [facility]  [address], [address]  [place] [address]  Phone: [phone]          Patient: José Miguel Garcia   MR Number: [id number]   YOB: 1965   Date of Visit: 12/3/2020       Dear Lisa Y. Cooksey:    Thank you for referring José Miguel Garcia to me for evaluation. Attached you will find relevant portions of my assessment and plan of care.    If you have questions, please do not hesitate to call me. I look forward to following José Miguel Garcia along with you.    Sincerely,    Tamiko Jerry, STEPHANIE    Enclosure  CC:  No Recipients    If you would like to receive this communication electronically, please contact [email or web address] or [phone] to request more information on [employer] Link access.    For providers and/or their staff who would like to refer a patient to [facility], please contact us through our one-stop-shop provider referral line, [facility] , at [phone].    If you feel you have received this communication in error or would no longer like to receive these types of communications, please e-mail [email or web address]

## 2020-12-03 NOTE — PROGRESS NOTES
Subjective:       Patient ID: José Miguel Garcia is a 55 y.o. male, Body mass index is 41.68 kg/m².    Chief Complaint: Anemia      Patient is new to me. Established patient of Dr. Vo.  Referred by Lisa Cooksey, NP for [facility].    Anemia  Presents for initial visit. Symptoms include malaise/fatigue. There has been no abdominal pain, anorexia, bruising/bleeding easily, confusion, fever, light-headedness, pallor, palpitations, pica or weight loss. Signs of blood loss that are present include melena (reports dark stools intermittently; onset when he first started iron supplement). Signs of blood loss that are not present include hematemesis and hematochezia. Past treatments include oral iron supplements (Currently: Ferrous sulfate 325 mg three times per week). Past medical history includes chronic liver disease (fatty liver). There is no history of alcohol abuse, chronic renal disease, clotting disorder, dementia, heart failure, hypothyroidism, inflammatory bowel disease, malabsorption, recent illness, recent surgery or recent trauma. Procedure history includes colonoscopy (done in 2016). There is no past history of EGD or FOBT.     Review of Systems   Constitutional: Positive for malaise/fatigue. Negative for appetite change, fever, unexpected weight change and weight loss.   HENT: Negative for trouble swallowing.    Respiratory: Negative for cough and shortness of breath.    Cardiovascular: Negative for chest pain and palpitations.   Gastrointestinal: Positive for melena (reports dark stools intermittently; onset when he first started iron supplement). Negative for abdominal distention, abdominal pain, anal bleeding, anorexia, blood in stool, constipation (bowel movements are once per day; taking Colace daily), diarrhea, hematemesis, hematochezia, nausea, rectal pain and vomiting.        Hx of GERD- well controlled on Protonix   Genitourinary: Negative for difficulty urinating, dysuria and hematuria.    Musculoskeletal: Negative for gait problem.   Skin: Negative for pallor and rash.   Neurological: Negative for speech difficulty and light-headedness.   Hematological: Does not bruise/bleed easily.   Psychiatric/Behavioral: Negative for confusion.       Past Medical History:   Diagnosis Date    Diabetes mellitus     type 2    DVT (deep venous thrombosis) 2011    Approx. 2011, after having a bunion removed. pt was on warfarin for approx. 6 weeks. unsure of time    Sickle cell anemia     pt states all his life    Sleep apnea     USES CPAP    Wears glasses       Past Surgical History:   Procedure Laterality Date    BUNIONECTOMY Left 2006    Pt developed a blood clot in leg, after having bunion removed. Had to do coumadin for approx. 6 weeks.    BUNIONECTOMY Left 2010    complicated by dvt    COLONOSCOPY N/A 11/17/2016    Procedure: COLONOSCOPY;  Surgeon: Keegan Vo MD;  Location[facility];  Service: Endoscopy;  Laterality: N/A;    KNEE SURGERY      Patient does not recall which side    SHOULDER SURGERY Right     VASECTOMY Bilateral 2011      Family History   Problem Relation Age of Onset    Heart block Mother     Kidney disease Mother     Cancer Paternal Aunt       Wt Readings from Last 10 Encounters:   12/03/20 120.7 kg (266 lb 1.5 oz)   11/30/20 120.2 kg (265 lb)   06/30/20 118.8 kg (262 lb)   06/24/20 120.2 kg (265 lb)   05/18/20 121.1 kg (267 lb)   05/14/20 121.1 kg (267 lb)   03/02/20 121.1 kg (267 lb)   01/28/20 121.1 kg (267 lb)   01/09/20 121.1 kg (267 lb)   12/06/19 121.3 kg (267 lb 6.7 oz)     Lab Results   Component Value Date    WBC 6.5 11/25/2020    HGB 12.7 (L) 11/25/2020    HCT 38.1 (L) 11/25/2020    MCV 84.7 11/25/2020     11/25/2020     CMP  Sodium   Date Value Ref Range Status   10/13/2020 143 135 - 146 mmol/L Final     Potassium   Date Value Ref Range Status   10/13/2020 4.3 3.5 - 5.3 mmol/L Final     Chloride   Date Value Ref Range Status   10/13/2020 105 98 - 110  mmol/L Final     CO2   Date Value Ref Range Status   10/13/2020 31 20 - 32 mmol/L Final     Glucose   Date Value Ref Range Status   10/13/2020 137 65 - 139 mg/dL Final     Comment:               Non-fasting reference interval          BUN   Date Value Ref Range Status   10/13/2020 12 7 - 25 mg/dL Final     Creatinine   Date Value Ref Range Status   10/13/2020 1.34 (H) 0.70 - 1.33 mg/dL Final     Comment:     For patients >49 years of age, the reference limit  for Creatinine is approximately 13% higher for people  identified as -American.          Calcium   Date Value Ref Range Status   10/13/2020 9.1 8.6 - 10.3 mg/dL Final     Total Protein   Date Value Ref Range Status   10/13/2020 6.8 6.1 - 8.1 g/dL Final     Albumin   Date Value Ref Range Status   10/13/2020 4.4 3.6 - 5.1 g/dL Final     Total Bilirubin   Date Value Ref Range Status   10/13/2020 0.3 0.2 - 1.2 mg/dL Final     Alkaline Phosphatase   Date Value Ref Range Status   06/24/2020 51 35 - 144 U/L Final     AST   Date Value Ref Range Status   10/13/2020 21 10 - 35 U/L Final     ALT   Date Value Ref Range Status   10/13/2020 15 9 - 46 U/L Final     eGFR if    Date Value Ref Range Status   10/13/2020 68 > OR = 60 mL/min/1.73m2 Final     eGFR if non    Date Value Ref Range Status   10/13/2020 58 (L) > OR = 60 mL/min/1.73m2 Final       Lab Results   Component Value Date    IRON 60 11/25/2020    TIBC 287 11/25/2020    FERRITIN 134 11/25/2020          Reviewed prior medical records including radiology report of US of abdomen 6/25/2020 & endoscopy history (see surgical history).     Objective:      Physical Exam  Constitutional:       General: He is not in acute distress.     Appearance: He is well-developed.   HENT:      Head: Normocephalic.      Right Ear: Hearing normal.      Left Ear: Hearing normal.      Nose: Nose normal.      Mouth/Throat:      Comments: Pt wearing mask due to COVID concerns  Eyes:      General: Lids  are normal.      Conjunctiva/sclera: Conjunctivae normal.      Pupils: Pupils are equal, round, and reactive to light.   Neck:      Musculoskeletal: Normal range of motion.      Trachea: Trachea normal.   Cardiovascular:      Rate and Rhythm: Normal rate and regular rhythm.      Heart sounds: Normal heart sounds. No murmur.   Pulmonary:      Effort: Pulmonary effort is normal. No respiratory distress.      Breath sounds: Normal breath sounds. No stridor. No wheezing.   Abdominal:      General: Bowel sounds are normal. There is no distension.      Palpations: Abdomen is soft. There is no mass.      Tenderness: There is abdominal tenderness (mild) in the left upper quadrant. There is no guarding or rebound.   Musculoskeletal: Normal range of motion.   Skin:     General: Skin is warm and dry.      Findings: No rash.      Comments: Non jaundiced   Neurological:      Mental Status: He is alert and oriented to person, place, and time.   Psychiatric:         Speech: Speech normal.         Behavior: Behavior normal. Behavior is cooperative.           Assessment:       1. Iron deficiency anemia, unspecified iron deficiency anemia type    2. Dark stools    3. Gastroesophageal reflux disease, unspecified whether esophagitis present    4. Fatty liver    5. History of colon polyps           Plan:   All diagnoses and orders for this visit:    Iron deficiency anemia, unspecified iron deficiency anemia type  - Discussed with patient the different ways that anemia occurs: blood loss (such as from the gi tract), the body is not making enough, or the body is breaking down the rbcs too quickly; recommend EGD and Colonoscopy to further evaluate gi tract for possible blood loss and pending results of endoscopies, possible UGI with Small Bowel Follow Through/video capsule study  - Continue Ferrous sulfate 325 mg three times per week  - Follow-up with PCP and/or hematology for continued evaluation and management    Dark stools   - Likely  due to iron supplement   - Schedule EGD    Gastroesophageal reflux disease, unspecified whether esophagitis present   - Continue Protonix 40 mg once daily   - Take PPI in the morning 30 minutes before breakfast   - Recommend to avoid large meals, avoid eating within 3 hours of bedtime, elevate head of bed if nocturnal symptoms are present, smoking cessation (if current smoker), & weight loss (if overweight).    - Recommend minimize/avoid high-fat foods, chocolate, caffeine, citrus, alcohol, & tomato products.   - Advised to avoid/limit use of NSAID's, since they can cause GI upset, bleeding, and/or ulcers. If needed, take with food.     Fatty liver   - For fatty liver recommend: low fat, low cholesterol diet, maintain good control of blood sugars and cholesterol levels, exercise, weight loss (if overweight), minimize/avoid alcohol and tylenol products, & follow-up with PCP for continued evaluation and management; if specialist is needed, recommend seeing hepatology.    History of colon polyps    If no improvement in symptoms or symptoms worsen, call/follow-up at clinic or go to ER

## 2020-12-03 NOTE — PATIENT INSTRUCTIONS
Iron-Deficiency Anemia (Adult)  Red blood cells carry oxygen to the tissues of your body. Anemia is a condition in which you have too few red blood cells. You need iron to make red cells. Anemia makes you feel tired and run down. When anemia becomes severe, your skin becomes pale. You may feel short of breath after physical activity. Other symptoms include:  · Headaches  · Dizziness  · Leg cramps with physical activity  · Drowsiness  · Restless legs  Your anemia is caused by not having enough iron in your body. This may be because of:  · Loss of blood. This can be caused by heavy menstrual periods. It can also be caused by bleeding from the stomach or intestines.  · Poor diet. You may not be eating enough foods that contain iron.  · Inability to absorb iron from the foods you eat  · Pregnancy  If your blood count is low enough, your healthcare provider may prescribe an iron supplement. It usually takes about 2 to 3 months of treatment with iron supplements to correct anemia. Severe cases of anemia need a blood transfusion to quickly ease symptoms and deliver more oxygen to the cells.  Home care  Follow these guidelines when caring for yourself at home:  · Eat foods high in iron. This will boost the amount of iron stored in your body. It is a natural way to build up the number of blood cells. Good sources of iron include beef, liver, spinach and other dark green leafy vegetables, whole grains, beans, and nuts.  · Do not overexert yourself.  · Talk with your healthcare provider before traveling by air or traveling to high altitudes.  Follow-up care  Follow up with your healthcare provider in 2 months, or as advised. This is to have another red blood cell count to be sure your anemia has been fixed.  When to seek medical advice  Call your healthcare provider right away if any of these occur:  · Shortness of breath or chest pain  · Dizziness or fainting  · Vomiting blood or passing red or black-colored stool   Date  Last Reviewed: 2/25/2016  © 8068-9369 [employer]. [address] [place] [address], [place] [address]. All rights reserved. This information is not intended as a substitute for professional medical care. Always follow your healthcare professional's instructions.

## 2020-12-03 NOTE — H&P (VIEW-ONLY)
Subjective:       Patient ID: José Miguel Garcia is a 55 y.o. male, Body mass index is 41.68 kg/m².    Chief Complaint: Anemia      Patient is new to me. Established patient of Dr. Vo.  Referred by Lisa Cooksey, NP for [facility].    Anemia  Presents for initial visit. Symptoms include malaise/fatigue. There has been no abdominal pain, anorexia, bruising/bleeding easily, confusion, fever, light-headedness, pallor, palpitations, pica or weight loss. Signs of blood loss that are present include melena (reports dark stools intermittently; onset when he first started iron supplement). Signs of blood loss that are not present include hematemesis and hematochezia. Past treatments include oral iron supplements (Currently: Ferrous sulfate 325 mg three times per week). Past medical history includes chronic liver disease (fatty liver). There is no history of alcohol abuse, chronic renal disease, clotting disorder, dementia, heart failure, hypothyroidism, inflammatory bowel disease, malabsorption, recent illness, recent surgery or recent trauma. Procedure history includes colonoscopy (done in 2016). There is no past history of EGD or FOBT.     Review of Systems   Constitutional: Positive for malaise/fatigue. Negative for appetite change, fever, unexpected weight change and weight loss.   HENT: Negative for trouble swallowing.    Respiratory: Negative for cough and shortness of breath.    Cardiovascular: Negative for chest pain and palpitations.   Gastrointestinal: Positive for melena (reports dark stools intermittently; onset when he first started iron supplement). Negative for abdominal distention, abdominal pain, anal bleeding, anorexia, blood in stool, constipation (bowel movements are once per day; taking Colace daily), diarrhea, hematemesis, hematochezia, nausea, rectal pain and vomiting.        Hx of GERD- well controlled on Protonix   Genitourinary: Negative for difficulty urinating, dysuria and hematuria.    Musculoskeletal: Negative for gait problem.   Skin: Negative for pallor and rash.   Neurological: Negative for speech difficulty and light-headedness.   Hematological: Does not bruise/bleed easily.   Psychiatric/Behavioral: Negative for confusion.       Past Medical History:   Diagnosis Date    Diabetes mellitus     type 2    DVT (deep venous thrombosis) 2011    Approx. 2011, after having a bunion removed. pt was on warfarin for approx. 6 weeks. unsure of time    Sickle cell anemia     pt states all his life    Sleep apnea     USES CPAP    Wears glasses       Past Surgical History:   Procedure Laterality Date    BUNIONECTOMY Left 2006    Pt developed a blood clot in leg, after having bunion removed. Had to do coumadin for approx. 6 weeks.    BUNIONECTOMY Left 2010    complicated by dvt    COLONOSCOPY N/A 11/17/2016    Procedure: COLONOSCOPY;  Surgeon: Keegan Vo MD;  Location: [facility];  Service: Endoscopy;  Laterality: N/A;    KNEE SURGERY      Patient does not recall which side    SHOULDER SURGERY Right     VASECTOMY Bilateral 2011      Family History   Problem Relation Age of Onset    Heart block Mother     Kidney disease Mother     Cancer Paternal Aunt       Wt Readings from Last 10 Encounters:   12/03/20 120.7 kg (266 lb 1.5 oz)   11/30/20 120.2 kg (265 lb)   06/30/20 118.8 kg (262 lb)   06/24/20 120.2 kg (265 lb)   05/18/20 121.1 kg (267 lb)   05/14/20 121.1 kg (267 lb)   03/02/20 121.1 kg (267 lb)   01/28/20 121.1 kg (267 lb)   01/09/20 121.1 kg (267 lb)   12/06/19 121.3 kg (267 lb 6.7 oz)     Lab Results   Component Value Date    WBC 6.5 11/25/2020    HGB 12.7 (L) 11/25/2020    HCT 38.1 (L) 11/25/2020    MCV 84.7 11/25/2020     11/25/2020     CMP  Sodium   Date Value Ref Range Status   10/13/2020 143 135 - 146 mmol/L Final     Potassium   Date Value Ref Range Status   10/13/2020 4.3 3.5 - 5.3 mmol/L Final     Chloride   Date Value Ref Range Status   10/13/2020 105 98 - 110  mmol/L Final     CO2   Date Value Ref Range Status   10/13/2020 31 20 - 32 mmol/L Final     Glucose   Date Value Ref Range Status   10/13/2020 137 65 - 139 mg/dL Final     Comment:               Non-fasting reference interval          BUN   Date Value Ref Range Status   10/13/2020 12 7 - 25 mg/dL Final     Creatinine   Date Value Ref Range Status   10/13/2020 1.34 (H) 0.70 - 1.33 mg/dL Final     Comment:     For patients >49 years of age, the reference limit  for Creatinine is approximately 13% higher for people  identified as -American.          Calcium   Date Value Ref Range Status   10/13/2020 9.1 8.6 - 10.3 mg/dL Final     Total Protein   Date Value Ref Range Status   10/13/2020 6.8 6.1 - 8.1 g/dL Final     Albumin   Date Value Ref Range Status   10/13/2020 4.4 3.6 - 5.1 g/dL Final     Total Bilirubin   Date Value Ref Range Status   10/13/2020 0.3 0.2 - 1.2 mg/dL Final     Alkaline Phosphatase   Date Value Ref Range Status   06/24/2020 51 35 - 144 U/L Final     AST   Date Value Ref Range Status   10/13/2020 21 10 - 35 U/L Final     ALT   Date Value Ref Range Status   10/13/2020 15 9 - 46 U/L Final     eGFR if    Date Value Ref Range Status   10/13/2020 68 > OR = 60 mL/min/1.73m2 Final     eGFR if non    Date Value Ref Range Status   10/13/2020 58 (L) > OR = 60 mL/min/1.73m2 Final       Lab Results   Component Value Date    IRON 60 11/25/2020    TIBC 287 11/25/2020    FERRITIN 134 11/25/2020          Reviewed prior medical records including radiology report of US of abdomen 6/25/2020 & endoscopy history (see surgical history).     Objective:      Physical Exam  Constitutional:       General: He is not in acute distress.     Appearance: He is well-developed.   HENT:      Head: Normocephalic.      Right Ear: Hearing normal.      Left Ear: Hearing normal.      Nose: Nose normal.      Mouth/Throat:      Comments: Pt wearing mask due to COVID concerns  Eyes:      General: Lids  are normal.      Conjunctiva/sclera: Conjunctivae normal.      Pupils: Pupils are equal, round, and reactive to light.   Neck:      Musculoskeletal: Normal range of motion.      Trachea: Trachea normal.   Cardiovascular:      Rate and Rhythm: Normal rate and regular rhythm.      Heart sounds: Normal heart sounds. No murmur.   Pulmonary:      Effort: Pulmonary effort is normal. No respiratory distress.      Breath sounds: Normal breath sounds. No stridor. No wheezing.   Abdominal:      General: Bowel sounds are normal. There is no distension.      Palpations: Abdomen is soft. There is no mass.      Tenderness: There is abdominal tenderness (mild) in the left upper quadrant. There is no guarding or rebound.   Musculoskeletal: Normal range of motion.   Skin:     General: Skin is warm and dry.      Findings: No rash.      Comments: Non jaundiced   Neurological:      Mental Status: He is alert and oriented to person, place, and time.   Psychiatric:         Speech: Speech normal.         Behavior: Behavior normal. Behavior is cooperative.           Assessment:       1. Iron deficiency anemia, unspecified iron deficiency anemia type    2. Dark stools    3. Gastroesophageal reflux disease, unspecified whether esophagitis present    4. Fatty liver    5. History of colon polyps           Plan:   All diagnoses and orders for this visit:    Iron deficiency anemia, unspecified iron deficiency anemia type  - Discussed with patient the different ways that anemia occurs: blood loss (such as from the gi tract), the body is not making enough, or the body is breaking down the rbcs too quickly; recommend EGD and Colonoscopy to further evaluate gi tract for possible blood loss and pending results of endoscopies, possible UGI with Small Bowel Follow Through/video capsule study  - Continue Ferrous sulfate 325 mg three times per week  - Follow-up with PCP and/or hematology for continued evaluation and management    Dark stools   - Likely  due to iron supplement   - Schedule EGD    Gastroesophageal reflux disease, unspecified whether esophagitis present   - Continue Protonix 40 mg once daily   - Take PPI in the morning 30 minutes before breakfast   - Recommend to avoid large meals, avoid eating within 3 hours of bedtime, elevate head of bed if nocturnal symptoms are present, smoking cessation (if current smoker), & weight loss (if overweight).    - Recommend minimize/avoid high-fat foods, chocolate, caffeine, citrus, alcohol, & tomato products.   - Advised to avoid/limit use of NSAID's, since they can cause GI upset, bleeding, and/or ulcers. If needed, take with food.     Fatty liver   - For fatty liver recommend: low fat, low cholesterol diet, maintain good control of blood sugars and cholesterol levels, exercise, weight loss (if overweight), minimize/avoid alcohol and tylenol products, & follow-up with PCP for continued evaluation and management; if specialist is needed, recommend seeing hepatology.    History of colon polyps    If no improvement in symptoms or symptoms worsen, call/follow-up at clinic or go to ER

## 2020-12-08 DIAGNOSIS — Z01.818 PRE-OP TESTING: ICD-10-CM

## 2020-12-08 DIAGNOSIS — D50.9 IRON DEFICIENCY ANEMIA, UNSPECIFIED IRON DEFICIENCY ANEMIA TYPE: Primary | ICD-10-CM

## 2020-12-20 ENCOUNTER — LAB VISIT (OUTPATIENT)
Dept: PRIMARY CARE CLINIC | Facility: CLINIC | Age: 55
End: 2020-12-20
Payer: COMMERCIAL

## 2020-12-20 DIAGNOSIS — Z01.818 PRE-OP TESTING: ICD-10-CM

## 2020-12-20 PROCEDURE — U0003 INFECTIOUS AGENT DETECTION BY NUCLEIC ACID (DNA OR RNA); SEVERE ACUTE RESPIRATORY SYNDROME CORONAVIRUS 2 (SARS-COV-2) (CORONAVIRUS DISEASE [COVID-19]), AMPLIFIED PROBE TECHNIQUE, MAKING USE OF HIGH THROUGHPUT TECHNOLOGIES AS DESCRIBED BY CMS-2020-01-R: HCPCS

## 2020-12-21 LAB — SARS-COV-2 RNA RESP QL NAA+PROBE: NOT DETECTED

## 2020-12-23 ENCOUNTER — ANESTHESIA EVENT (OUTPATIENT)
Dept: ENDOSCOPY | Facility: HOSPITAL | Age: 55
End: 2020-12-23
Payer: COMMERCIAL

## 2020-12-23 ENCOUNTER — ANESTHESIA (OUTPATIENT)
Dept: ENDOSCOPY | Facility: HOSPITAL | Age: 55
End: 2020-12-23
Payer: COMMERCIAL

## 2020-12-23 ENCOUNTER — HOSPITAL ENCOUNTER (OUTPATIENT)
Facility: HOSPITAL | Age: 55
Discharge: HOME | End: 2020-12-23
Attending: INTERNAL MEDICINE | Admitting: INTERNAL MEDICINE
Payer: COMMERCIAL

## 2020-12-23 VITALS
TEMPERATURE: 98 F | OXYGEN SATURATION: 99 % | RESPIRATION RATE: 22 BRPM | BODY MASS INDEX: 40.81 KG/M2 | HEART RATE: 87 BPM | SYSTOLIC BLOOD PRESSURE: 183 MMHG | DIASTOLIC BLOOD PRESSURE: 95 MMHG | HEIGHT: 67 IN | WEIGHT: 260 LBS

## 2020-12-23 DIAGNOSIS — K29.70 GASTRITIS, PRESENCE OF BLEEDING UNSPECIFIED, UNSPECIFIED CHRONICITY, UNSPECIFIED GASTRITIS TYPE: Primary | ICD-10-CM

## 2020-12-23 DIAGNOSIS — K64.8 INTERNAL HEMORRHOIDS: ICD-10-CM

## 2020-12-23 DIAGNOSIS — D50.9 FE DEFICIENCY ANEMIA: ICD-10-CM

## 2020-12-23 DIAGNOSIS — K44.9 HIATAL HERNIA: ICD-10-CM

## 2020-12-23 DIAGNOSIS — K63.5 POLYP OF COLON, UNSPECIFIED PART OF COLON, UNSPECIFIED TYPE: ICD-10-CM

## 2020-12-23 PROCEDURE — 43239 EGD BIOPSY SINGLE/MULTIPLE: CPT | Mod: 22,,, | Performed by: INTERNAL MEDICINE

## 2020-12-23 PROCEDURE — 63600175 PHARM REV CODE 636 W HCPCS: Performed by: NURSE ANESTHETIST, CERTIFIED REGISTERED

## 2020-12-23 PROCEDURE — D9220A PRA ANESTHESIA: ICD-10-PCS | Mod: ANES,,, | Performed by: ANESTHESIOLOGY

## 2020-12-23 PROCEDURE — 45380 PR COLONOSCOPY,BIOPSY: ICD-10-PCS | Mod: 51,,, | Performed by: INTERNAL MEDICINE

## 2020-12-23 PROCEDURE — 37000009 HC ANESTHESIA EA ADD 15 MINS: Performed by: INTERNAL MEDICINE

## 2020-12-23 PROCEDURE — 88342 IMHCHEM/IMCYTCHM 1ST ANTB: CPT | Mod: 26,,, | Performed by: STUDENT IN AN ORGANIZED HEALTH CARE EDUCATION/TRAINING PROGRAM

## 2020-12-23 PROCEDURE — 37000008 HC ANESTHESIA 1ST 15 MINUTES: Performed by: INTERNAL MEDICINE

## 2020-12-23 PROCEDURE — 88305 TISSUE EXAM BY PATHOLOGIST: ICD-10-PCS | Mod: 26,,, | Performed by: STUDENT IN AN ORGANIZED HEALTH CARE EDUCATION/TRAINING PROGRAM

## 2020-12-23 PROCEDURE — 25000003 PHARM REV CODE 250: Performed by: NURSE ANESTHETIST, CERTIFIED REGISTERED

## 2020-12-23 PROCEDURE — 88342 IMHCHEM/IMCYTCHM 1ST ANTB: CPT | Performed by: STUDENT IN AN ORGANIZED HEALTH CARE EDUCATION/TRAINING PROGRAM

## 2020-12-23 PROCEDURE — 45380 COLONOSCOPY AND BIOPSY: CPT | Mod: 51,,, | Performed by: INTERNAL MEDICINE

## 2020-12-23 PROCEDURE — D9220A PRA ANESTHESIA: Mod: CRNA,,, | Performed by: NURSE ANESTHETIST, CERTIFIED REGISTERED

## 2020-12-23 PROCEDURE — 45380 COLONOSCOPY AND BIOPSY: CPT | Performed by: INTERNAL MEDICINE

## 2020-12-23 PROCEDURE — 88342 CHG IMMUNOCYTOCHEMISTRY: ICD-10-PCS | Mod: 26,,, | Performed by: STUDENT IN AN ORGANIZED HEALTH CARE EDUCATION/TRAINING PROGRAM

## 2020-12-23 PROCEDURE — 43239 PR EGD, FLEX, W/BIOPSY, SGL/MULTI: ICD-10-PCS | Mod: 22,,, | Performed by: INTERNAL MEDICINE

## 2020-12-23 PROCEDURE — D9220A PRA ANESTHESIA: Mod: ANES,,, | Performed by: ANESTHESIOLOGY

## 2020-12-23 PROCEDURE — 88305 TISSUE EXAM BY PATHOLOGIST: CPT | Mod: 59 | Performed by: STUDENT IN AN ORGANIZED HEALTH CARE EDUCATION/TRAINING PROGRAM

## 2020-12-23 PROCEDURE — 88305 TISSUE EXAM BY PATHOLOGIST: CPT | Mod: 26,,, | Performed by: STUDENT IN AN ORGANIZED HEALTH CARE EDUCATION/TRAINING PROGRAM

## 2020-12-23 PROCEDURE — 27201012 HC FORCEPS, HOT/COLD, DISP: Performed by: INTERNAL MEDICINE

## 2020-12-23 PROCEDURE — D9220A PRA ANESTHESIA: ICD-10-PCS | Mod: CRNA,,, | Performed by: NURSE ANESTHETIST, CERTIFIED REGISTERED

## 2020-12-23 PROCEDURE — 25000003 PHARM REV CODE 250: Performed by: INTERNAL MEDICINE

## 2020-12-23 PROCEDURE — 43239 EGD BIOPSY SINGLE/MULTIPLE: CPT | Performed by: INTERNAL MEDICINE

## 2020-12-23 RX ORDER — ONDANSETRON 2 MG/ML
INJECTION INTRAMUSCULAR; INTRAVENOUS
Status: DISCONTINUED | OUTPATIENT
Start: 2020-12-23 | End: 2020-12-23

## 2020-12-23 RX ORDER — PROPOFOL 10 MG/ML
VIAL (ML) INTRAVENOUS
Status: DISCONTINUED | OUTPATIENT
Start: 2020-12-23 | End: 2020-12-23

## 2020-12-23 RX ORDER — DEXTROMETHORPHAN/PSEUDOEPHED 2.5-7.5/.8
DROPS ORAL
Status: COMPLETED | OUTPATIENT
Start: 2020-12-23 | End: 2020-12-23

## 2020-12-23 RX ORDER — LIDOCAINE HCL/PF 100 MG/5ML
SYRINGE (ML) INTRAVENOUS
Status: DISCONTINUED | OUTPATIENT
Start: 2020-12-23 | End: 2020-12-23

## 2020-12-23 RX ORDER — SUCRALFATE 1 G/1
1 TABLET ORAL
Qty: 360 TABLET | Refills: 0 | Status: SHIPPED | OUTPATIENT
Start: 2020-12-23 | End: 2021-03-23

## 2020-12-23 RX ORDER — SODIUM CHLORIDE 9 MG/ML
INJECTION, SOLUTION INTRAVENOUS CONTINUOUS
Status: DISCONTINUED | OUTPATIENT
Start: 2020-12-23 | End: 2020-12-23 | Stop reason: HOSPADM

## 2020-12-23 RX ADMIN — PROPOFOL 10 MG: 10 INJECTION, EMULSION INTRAVENOUS at 08:12

## 2020-12-23 RX ADMIN — PROPOFOL 150 MG: 10 INJECTION, EMULSION INTRAVENOUS at 08:12

## 2020-12-23 RX ADMIN — GLYCOPYRROLATE 0.2 MG: 0.2 INJECTION, SOLUTION INTRAMUSCULAR; INTRAVITREAL at 08:12

## 2020-12-23 RX ADMIN — ONDANSETRON 4 MG: 2 INJECTION, SOLUTION INTRAMUSCULAR; INTRAVENOUS at 08:12

## 2020-12-23 RX ADMIN — LIDOCAINE HYDROCHLORIDE 100 MG: 20 INJECTION INTRAVENOUS at 08:12

## 2020-12-23 RX ADMIN — PROPOFOL 30 MG: 10 INJECTION, EMULSION INTRAVENOUS at 08:12

## 2020-12-23 RX ADMIN — SIMETHICONE 40 MG: 20 SUSPENSION/ DROPS ORAL at 08:12

## 2020-12-23 RX ADMIN — PROPOFOL 20 MG: 10 INJECTION, EMULSION INTRAVENOUS at 08:12

## 2020-12-23 RX ADMIN — SODIUM CHLORIDE 10 ML/HR: 0.9 INJECTION, SOLUTION INTRAVENOUS at 08:12

## 2020-12-23 NOTE — [employer] PATIENT INSTRUCTIONS
Discharge Summary/Instructions after an Endoscopic Procedure  Patient Name: José Miguel Garcia  Patient MRN: [id number]  Patient YOB: 1965 Wednesday, December 23, 2020  Keegan Coleman MD  RESTRICTIONS:  During your procedure today, you received medications for sedation.  These   medications may affect your judgment, balance and coordination.  Therefore,   for 24 hours, you have the following restrictions:   - DO NOT drive a car, operate machinery, make legal/financial decisions,   sign important papers or drink alcohol.    ACTIVITY:  Today: no heavy lifting, straining or running due to procedural   sedation/anesthesia.  The following day: return to full activity including work.  DIET:  Eat and drink normally unless instructed otherwise.     TREATMENT FOR COMMON SIDE EFFECTS:  - Mild abdominal pain, nausea, belching, bloating or excessive gas:  rest,   eat lightly and use a heating pad.  - Sore Throat: treat with throat lozenges and/or gargle with warm salt   water.  - Because air was used during the procedure, expelling large amounts of air   from your rectum or belching is normal.  - If a bowel prep was taken, you may not have a bowel movement for 1-3 days.    This is normal.  SYMPTOMS TO WATCH FOR AND REPORT TO YOUR PHYSICIAN:  1. Abdominal pain or bloating, other than gas cramps.  2. Chest pain.  3. Back pain.  4. Signs of infection such as: chills or fever occurring within 24 hours   after the procedure.  5. Rectal bleeding, which would show as bright red, maroon, or black stools.   (A tablespoon of blood from the rectum is not serious, especially if   hemorrhoids are present.)  6. Vomiting.  7. Weakness or dizziness.  GO DIRECTLY TO THE NEAREST EMERGENCY ROOM IF YOU HAVE ANY OF THE FOLLOWING:      Difficulty breathing              Chills and/or fever over 101 F   Persistent vomiting and/or vomiting blood   Severe abdominal pain   Severe chest pain   Black, tarry stools   Bleeding- more than one  tablespoon   Any other symptom or condition that you feel may need urgent attention  Your doctor recommends these additional instructions:  If any biopsies were taken, your doctors clinic will contact you in 1 to 2   weeks with any results.  - Patient has a contact number available for emergencies.  The signs and   symptoms of potential delayed complications were discussed with the   patient.  Return to normal activities tomorrow.  Written discharge   instructions were provided to the patient.   - Resume previous diet.   - Continue present medications.   - No aspirin, ibuprofen, naproxen, or other non-steroidal anti-inflammatory   drugs.   - Await pathology results.   - Discharge patient to home (ambulatory).   - Perform a colonoscopy today.   - Use sucralfate tablets 1 gram PO QID for 10 days.   - Return to nurse practitioner in 6 weeks.  For questions, problems or results please call your physician - Keegan Coleman MD at Work:  [phone].  [facility], EMERGENCY ROOM PHONE NUMBER: [phone]  IF A COMPLICATION OR EMERGENCY SITUATION ARISES AND YOU ARE UNABLE TO REACH   YOUR PHYSICIAN - GO DIRECTLY TO THE EMERGENCY ROOM.  Keegan Coleman MD  12/23/2020 8:39:06 AM  This report has been verified and signed electronically.  [employer]

## 2020-12-23 NOTE — [employer] PATIENT INSTRUCTIONS
Discharge Summary/Instructions after an Endoscopic Procedure  Patient Name: José Miguel Garcia  Patient MRN: [id number]  Patient YOB: 1965 Wednesday, December 23, 2020  Keegan Coleman MD  RESTRICTIONS:  During your procedure today, you received medications for sedation.  These   medications may affect your judgment, balance and coordination.  Therefore,   for 24 hours, you have the following restrictions:   - DO NOT drive a car, operate machinery, make legal/financial decisions,   sign important papers or drink alcohol.    ACTIVITY:  Today: no heavy lifting, straining or running due to procedural   sedation/anesthesia.  The following day: return to full activity including work.  DIET:  Eat and drink normally unless instructed otherwise.     TREATMENT FOR COMMON SIDE EFFECTS:  - Mild abdominal pain, nausea, belching, bloating or excessive gas:  rest,   eat lightly and use a heating pad.  - Sore Throat: treat with throat lozenges and/or gargle with warm salt   water.  - Because air was used during the procedure, expelling large amounts of air   from your rectum or belching is normal.  - If a bowel prep was taken, you may not have a bowel movement for 1-3 days.    This is normal.  SYMPTOMS TO WATCH FOR AND REPORT TO YOUR PHYSICIAN:  1. Abdominal pain or bloating, other than gas cramps.  2. Chest pain.  3. Back pain.  4. Signs of infection such as: chills or fever occurring within 24 hours   after the procedure.  5. Rectal bleeding, which would show as bright red, maroon, or black stools.   (A tablespoon of blood from the rectum is not serious, especially if   hemorrhoids are present.)  6. Vomiting.  7. Weakness or dizziness.  GO DIRECTLY TO THE NEAREST EMERGENCY ROOM IF YOU HAVE ANY OF THE FOLLOWING:      Difficulty breathing              Chills and/or fever over 101 F   Persistent vomiting and/or vomiting blood   Severe abdominal pain   Severe chest pain   Black, tarry stools   Bleeding- more than one  tablespoon   Any other symptom or condition that you feel may need urgent attention  Your doctor recommends these additional instructions:  If any biopsies were taken, your doctors clinic will contact you in 1 to 2   weeks with any results.  - Patient has a contact number available for emergencies.  The signs and   symptoms of potential delayed complications were discussed with the   patient.  Return to normal activities tomorrow.  Written discharge   instructions were provided to the patient.   - High fiber diet.   - Continue present medications.   - Await pathology results.   - Repeat colonoscopy in 5 years for surveillance.   - Discharge patient to home (ambulatory).   - To visualize the small bowel, perform video capsule endoscopy at   appointment to be scheduled.   - Return to nurse practitioner after studies are complete.  For questions, problems or results please call your physician - Keegan Coleman MD at Work:  [phone].  [facility], EMERGENCY ROOM PHONE NUMBER: [phone]  IF A COMPLICATION OR EMERGENCY SITUATION ARISES AND YOU ARE UNABLE TO REACH   YOUR PHYSICIAN - GO DIRECTLY TO THE EMERGENCY ROOM.  Keegan Coleman MD  12/23/2020 8:55:00 AM  This report has been verified and signed electronically.  [employer]

## 2020-12-23 NOTE — ANESTHESIA PREPROCEDURE EVALUATION
12/23/2020  José Miguel Garcia is a 55 y.o., male.    Anesthesia Evaluation    I have reviewed the Patient Summary Reports.    I have reviewed the Nursing Notes.    I have reviewed the Medications.     Review of Systems  Anesthesia Hx:  No problems with previous Anesthesia    Social:  Former Smoker    Hematology/Oncology:         -- Anemia: Hematology Comments: Sickle Cell Trait     Cardiovascular:   Hypertension, well controlled    Pulmonary:   Sleep Apnea DVT   Education provided regarding risk of obstructive sleep apnea     Renal/:  Renal/ Normal     Hepatic/GI:   Liver Disease,    Musculoskeletal:   Arthritis     Neurological:   Neuromuscular Disease,    Endocrine:   Diabetes, well controlled, type 2        Physical Exam  General:  Well nourished, Morbid Obesity, Obesity    Airway/Jaw/Neck:  Airway Findings: Mouth Opening: Normal Tongue: Normal  General Airway Assessment: Adult  Oropharynx Findings:  Mallampati: II  Jaw/Neck Findings:  Neck ROM: Normal ROM     Eyes/Ears/Nose:  Eyes/Ears/Nose Findings:    Dental:  Dental Findings:   Chest/Lungs:  Chest/Lungs Findings: Normal Respiratory Rate     Heart/Vascular:  Heart Findings: Rate: Normal  Rhythm: Regular Rhythm        Mental Status:  Mental Status Findings:  Cooperative, Alert and Oriented         Anesthesia Plan  Type of Anesthesia, risks & benefits discussed:  Anesthesia Type:  general  Patient's Preference:   Intra-op Monitoring Plan: standard ASA monitors  Intra-op Monitoring Plan Comments:   Post Op Pain Control Plan: multimodal analgesia  Post Op Pain Control Plan Comments:   Induction:   IV  Beta Blocker:  Patient is not currently on a Beta-Blocker (No further documentation required).       Informed Consent: Patient understands risks and agrees with Anesthesia plan.  Questions answered. Anesthesia consent signed with patient.  ASA Score: 3      Day of Surgery Review of History & Physical:            Ready For Surgery From Anesthesia Perspective.

## 2020-12-23 NOTE — PLAN OF CARE
Pt met discharge criteria, MD spoke to patient and aunt, family at bedside, discharged via wheelchair, Tash explained capsule procedure and scheduled with patient.  CP, RN

## 2020-12-23 NOTE — ANESTHESIA POSTPROCEDURE EVALUATION
Anesthesia Post Evaluation    Patient: José Miguel Garcia    Procedure(s) Performed: Procedure(s) (LRB):  EGD (ESOPHAGOGASTRODUODENOSCOPY) (N/A)  COLONOSCOPY (N/A)    Final Anesthesia Type: general      Patient location during evaluation: PACU  Patient participation: Yes- Able to Participate  Level of consciousness: awake and alert and oriented  Post-procedure vital signs: reviewed and stable  Pain management: adequate  Airway patency: patent    PONV status at discharge: No PONV  Anesthetic complications: no      Cardiovascular status: blood pressure returned to baseline and stable  Respiratory status: unassisted and spontaneous ventilation  Hydration status: euvolemic  Follow-up not needed.          Vitals Value Taken Time   /94 12/23/20 0936   Temp 36.7 °C (98.1 °F) 12/23/20 0935   Pulse 84 12/23/20 0941   Resp 22 12/23/20 0854   SpO2 95 % 12/23/20 0941   Vitals shown include unvalidated device data.      Event Time   Out of Recovery 09:57:40         Pain/Shyanne Score: Shyanne Score: 10 (12/23/2020  9:30 AM)

## 2020-12-23 NOTE — TRANSFER OF CARE
"Anesthesia Transfer of Care Note    Patient: José Miguel Garcia    Procedure(s) Performed: Procedure(s) (LRB):  EGD (ESOPHAGOGASTRODUODENOSCOPY) (N/A)  COLONOSCOPY (N/A)    Patient location: PACU    Anesthesia Type: general    Transport from OR: Transported from OR on room air with adequate spontaneous ventilation    Post pain: adequate analgesia    Post assessment: no apparent anesthetic complications and tolerated procedure well    Post vital signs: stable    Level of consciousness: sedated    Nausea/Vomiting: no nausea/vomiting    Complications: none    Transfer of care protocol was followed      Last vitals:   Visit Vitals  BP (!) 140/81   Pulse 87   Temp 36.7 °C (98.1 °F) (Skin)   Resp (!) 22   Ht 5' 7" (1.702 m)   Wt 117.9 kg (260 lb)   SpO2 99%   BMI 40.72 kg/m²     "

## 2020-12-23 NOTE — DISCHARGE INSTRUCTIONS
Hernia    What Is a Hiatal Hernia?    Hiatal hernia is when the area where the stomach and esophagus meet bulges up through the diaphragm into the chest cavity. In some cases, part of the stomach may bulge above the diaphragm. Stomach acid may move up into the esophagus and cause symptoms. The symptoms are often blamed on gastroesophageal reflux disease (GERD). You may only know about the hernia when it shows up on an X-ray taken for other reasons.   What you may feel  The hiatus is a normal hole in the diaphragm. The esophagus passes through this hole and leads to the stomach. In some cases, part of the stomach may bulge above the diaphragm. This bulge is called a hernia. Stomach acid may move up into the esophagus and cause symptoms.  When you eat, the muscle at the hiatus relaxes to allow food to pass into the stomach. It tightens again to keep food and digestive acids in the stomach.  Many people with hiatal hernias have mild symptoms. You may notice the following GERD symptoms:  · Heartburn or other chest discomfort  · A feeling of chest fullness after a meal  · Frequent burping  · Acid taste in the mouth  · Trouble swallowing  Treating symptoms  If you have been diagnosed with hiatal hernia, these suggestions may help improve symptoms:  · Lose excess weight. Extra weight puts pressure on the stomach and esophagus.  · Dont lie down after eating. Sit up for at least an hour after eating. Lying down after eating can increase symptoms.  · Avoid certain foods and drinks. These include fatty foods, chocolate, coffee, mint, and other foods that cause symptoms for you.  · Dont smoke or drink alcohol. These can worsen symptoms.  · Look at your medicines. Discuss your medicines with your healthcare provider. Many medicines can cause symptoms.  · Consider an antacid medicine. Ask your healthcare provider about over-the-counter and prescription medicines that may help.  · Ask about surgery, if needed. Surgery is a  treatment choice for some people. Your healthcare provider can determine if surgery is an option for you.    Date Last Reviewed: 10/1/2016  © 7342-2055 [employer]. [address] [place] [address], [place] [address]. All rights reserved. This information is not intended as a substitute for professional medical care. Always follow your healthcare professional's instructions.        Understanding Colon and Rectal Polyps    The colon (also called the large intestine) is a muscular tube that forms the last part of the digestive tract. It absorbs water and stores food waste. The colon is about 4 to 6 feet long. The rectum is the last 6 inches of the colon. The colon and rectum have a smooth lining composed of millions of cells. Changes in these cells can lead to growths in the colon that can become cancerous and should be removed. Multiple tests are available to screen for colon cancer, but the colonoscopy is the most recommended test. During colonoscopy, these polyps can be removed. How often you need this test depends on many things including your condition, your family history, symptoms, and what the findings were at the previous colonoscopy.   When the colon lining changes  Changes that happen in the cells that line the colon or rectum can lead to growths called polyps. Over a period of years, polyps can turn cancerous. Removing polyps early may prevent cancer from ever forming.  Polyps  Polyps are fleshy clumps of tissue that form on the lining of the colon or rectum. Small polyps are usually benign (not cancerous). However, over time, cells in a polyp can change and become cancerous. Certain types of polyps known as adenomatous polyps are premalignant. The risk for invasive cancer increases with the size of the polyp and certain cell and gene features. This means that they can become cancerous if they're not removed. Hyperplastic polyps are benign. They can grow quite large and not turn  cancerous.   Cancer  Almost all colorectal cancers start when polyp cells begin growing abnormally. As a cancerous tumor grows, it may involve more and more of the colon or rectum. In time, cancer can also grow beyond the colon or rectum and spread to nearby organs or to glands called lymph nodes. The cells can also travel to other parts of the body. This is known as metastasis. The earlier a cancerous tumor is removed, the better the chance of preventing its spread.    Date Last Reviewed: 8/1/2016 © 2000-2017 [employer]. [address] [place] [address]. All rights reserved. This information is not intended as a substitute for professional medical care. Always follow your healthcare professional's instructions.        Hemorrhoids    Hemorrhoids are swollen and inflamed veins inside the rectum and near the anus. The rectum is the last several inches of the colon. The anus is the passage between the rectum and the outside of the body.  Causes  The veins can become swollen due to increased pressure in them. This is most often caused by:  · Chronic constipation or diarrhea  · Straining when having a bowel movement  · Sitting too long on the toilet  · A low-fiber diet  · Pregnancy  Symptoms  · Bleeding from the rectum (this may be noticeable after bowel movements)  · Lump near the anus  · Itching around the anus  · Pain around the anus  There are different types of hemorrhoids. Depending on the type you have and the severity, you may be able to treat yourself at home. In some cases, a procedure may be the best treatment option. Your healthcare provider can tell you more about this, if needed.  Home care  General care  · To get relief from pain or itching, try:  ¨ Topical products. Your healthcare provider may prescribe or recommend creams, ointments, or pads that can be applied to the hemorrhoid. Use these exactly as directed.  ¨ Medicines. Your healthcare provider may recommend stool softeners,  suppositories, or laxatives to help manage constipation. Use these exactly as directed.  ¨ Sitz baths. A sitz bath involves sitting in a few inches of warm bath water. Be careful not to make the water so hot that you burn yourself--test it before sitting in it. Soak for about 10 to 15 minutes a few times a day. This may help relieve pain.  Tips to help prevent hemorrhoids  · Eat more fiber. Fiber adds bulk to stool and absorbs water as it moves through your colon. This makes stool softer and easier to pass.  ¨ Increase the fiber in your diet with more fiber-rich foods. These include fresh fruit, vegetables, and whole grains.  ¨ Take a fiber supplement or bulking agent, if advised to by your provider. These include products such as psyllium or methylcellulose.  · Drink plenty of water, if directed to by your provider. This can help keep stool soft.  · Be more active. Frequent exercise aids digestion and helps prevent constipation. It may also help make bowel movements more regular.  · Dont strain during bowel movements. This can make hemorrhoids more likely. Also, dont sit on the toilet for long periods of time.  Follow-up care  Follow up with your healthcare provider, or as advised. If a culture or imaging tests were done, you will be notified of the results when they are ready. This may take a few days or longer.  When to seek medical advice  Call your healthcare provider right away if any of these occur:  · Increased bleeding from the rectum  · Increased pain around the rectum or anus  · Weakness or dizziness  Call 911  Call 911 or return to the emergency department right away if any of these occur:  · Trouble breathing or swallowing  · Fainting or loss of consciousness  · Unusually fast heart rate  · Vomiting blood  · Large amounts of blood in stool  Date Last Reviewed: 6/22/2015  © 1027-4339 [employer]. [address] [place] [address], [place] [address]. All rights reserved. This information is not  intended as a substitute for professional medical care. Always follow your healthcare professional's instructions.        Diverticulitis    Some people get pouches along the wall of the colon as they get older. The pouches, called diverticuli, usually cause no symptoms. If the pouches become blocked, you can get an infection. This infection is called diverticulitis. It causes pain in your lower abdomen and fever. If not treated, it can become a serious condition, causing an abscess to form inside the pouch. The abscess may block the intestinal tract even or rupture, spreading infection throughout the abdomen.  When treatment is started early, oral antibiotics alone may be enough to cure diverticulitis. This method is tried first. But, if you don't improve or if your condition gets worse while using oral antibiotics, you may need to be admitted to the hospital for IV antibiotics. Severe cases may require surgery.  Home care  The following guidelines will help you care for yourself at home:  · During the acute illness, rest and follow your healthcare provider's instructions about diet. Sometimes you will need to follow a clear liquid diet to rest your bowel. Once your symptoms are better, you may be told to follow a low-fiber diet for some time. Include foods like:  ¨ Flake cereal, mashed potatoes, pancakes, waffles, pasta, white bread, rice, applesauce, bananas, eggs, fish, poultry, tofu, and cooked soft vegetables  · Take antibiotics exactly as instructed. Don't miss any doses or stop taking the medication, even if you feel better.  · Monitor your temperature and tell your healthcare provider if you have rising temperatures.  Preventing future attacks  Once you have an episode of diverticulitis, you are at risk for having it again. After you have recovered from this episode, you may be able to lower your risk by eating a high-fiber diet (20 gm/day to 35 gm/day of fiber). This cleans out the colon pouches that  already exist and may prevent new ones from forming. Foods high in fiber include fresh fruits and edible peelings, raw or lightly cooked vegetables, whole grain cereals and breads, dried beans and peas, and bran.  Other steps that can help prevent future attacks include:  · Take your medicines, such as antibiotics, as your healthcare provider says.  · Drink 6 to 8 glasses of water every day, unless told otherwise.  · Use a heating pad or hot water bottle to help abdominal cramping or pain.  · Begin an exercise program. Ask your healthcare provider how to get started. You can benefit from simple activities such as walking or gardening.  · Treat diarrhea with a bland diet. Start with liquids only; then slowly add fiber over time.  · Watch for changes in your bowel movements (constipation to diarrhea). Avoid constipation by eating a high fiber diet and taking a stool softener if needed.  · Get plenty of rest and sleep.  Follow-up care  Follow up with your healthcare provider as advised or sooner if you are not getting better in the next 2 days.  When to seek medical advice  Call your healthcare provider right away if any of these occur:  · Fever of 100.4°F (38°C) or higher, or as directed by your healthcare provider  · Repeated vomiting or swelling of the abdomen  · Weakness, dizziness, light-headedness  · Pain in your abdomen that gets worse, severe, or spreads to your back  · Pain that moves to the right lower abdomen  · Rectal bleeding (stools that are red, black or maroon color)  · Unexpected vaginal bleeding  Date Last Reviewed: 9/1/2016  © 6944-6713 [employer]. [address] [place] [address], [place] [address]. All rights reserved. This information is not intended as a substitute for professional medical care. Always follow your healthcare professional's instructions.        Celiac Disease     With celiac disease, villi inside the small intestine become damaged and cannot absorb nutrients properly.      Celiac disease is caused by a sensitivity or allergy to gluten. This is a protein found in many grains such as wheat, barley, and rye. Celiac disease affects villi (tiny, fingerlike stalks) in the small bowel (intestine). Normally, the villi make it possible for the small bowel to absorb nutrients from the food you eat. But celiac disease damages the villi. As a result, you cant absorb the nutrients you need, even if you eat plenty of food. Celiac disease is an autoimmune disease. You can manage the disease by removing gluten from your diet. This relieves your symptoms. It also reverses the damage to your small bowel. Celiac disease is sometimes called celiac sprue.  Causes of celiac disease  Celiac disease may have a genetic component. This means it can be passed down in families. If your healthcare provider thinks that you have celiac disease, he or she may advise that other members of your family be checked for it as well.  Signs and symptoms of celiac disease  The symptoms of celiac disease can vary for each person. Some people have no symptoms at all. If symptoms do happen, they can include:  · Diarrhea, constipation, or both  · Light colored, foul-smelling or fatty stool  · Belly pain and cramping  · Belly swelling or bloating  · Weight loss  · Bone or joint pain  · Iron deficiency  · Headaches  · Tiredness and loss of energy  · Mood changes, irritability, and depression  · Infertility  · Unexplained elevated liver tests  · Canker sores  · Skin rash  · Tooth enamel problems  Diagnosing celiac disease  Your healthcare provider will ask about your symptoms and health history. Youll also have a physical exam. Tests are then done to confirm the problem. These can include:  · Blood tests. These help check for specific proteins in the blood that are present with celiac disease. They also check for anemia and help rule out other problems. The tests are done by taking a blood sample.  · Upper endoscopy with  biopsy. This is done to see inside the stomach and duodenum (first part of the small bowel). For the test, an endoscope is used. This is a thin, flexible tube with a tiny camera on the end. Its inserted through the mouth and down into the stomach and duodenum. Tools are passed through the endoscope to remove tiny tissue samples (biopsy). The tissue samples are taken to a lab and looked at under a microscope. This is to check the tiny villi for damage. This test must be done while you are still eating food with gluten. This is the only way to see whether the presence of gluten is damaging the villi.  · Genetic tests. These check for problems with specific genes linked to celiac disease. They are done by taking blood samples.  Treating celiac disease  To treat celiac disease, you must remove all sources of gluten from your diet. This will allow the villi to heal, so that nutrients can be absorbed normally. Its important to follow a strict, gluten-free diet daily, even if you dont have symptoms. If you dont do this, the small bowel can become permanently damaged, which can lead to serious health problems. These include bone disease, cancer of the small bowel, and various nervous system disorders.  Sources of gluten  Gluten is found in wheat, barley, and rye. The most common foods with gluten are those made with wheat flour. These include bread, pasta, cake, and cereal. Gluten is also often found in beer, gravies, salad dressings, and most packaged foods. It is even found in some nonfood products, such as certain medicines and cosmetics. Your healthcare provider can refer you to a dietitian to  you about what you should avoid. The resources below will also give you lists of food and products that contain gluten.  Follow-up  Youll meet with your healthcare provider periodically to monitor your health. During these visits, routine blood tests are often done to make sure your condition is under control. Your  healthcare provider can also refer you to other healthcare providers or support and advocacy groups to help you cope with your condition.  Learning more about celiac disease  The following resources can help you learn more about celiac disease and how to manage it:  · Celiac Disease Foundation, www.celiac.org  · Celiac Sprue Association, www.csaceliacs.org  · Gluten Intolerance Group, www.gluten.net  · National [place] of Diabetes and Digestive and Kidney Diseases, www2.niddk.nih.gov   Date Last Reviewed: 7/1/2016 © 2000-2017 [employer]. [address] [place] [address]. All rights reserved. This information is not intended as a substitute for professional medical care. Always follow your healthcare professional's instructions.        Gastritis (Adult)    Gastritis is inflammation and irritation of the stomach lining. It can be present for a short time (acute) or be long lasting (chronic). Gastritis is often caused by infection with bacteria called H pylori. More than a third of people in the  have this bacteria in their bodies. In many cases, H pylori causes no problems or symptoms. In some people, though, the infection irritates the stomach lining and causes gastritis. Other causes of stomach irritation include drinking alcohol or taking pain-relieving medicines called NSAIDs (such as aspirin or ibuprofen).   Symptoms of gastritis can include:  · Abdominal pain or bloating  · Loss of appetite  · Nausea or vomiting  · Vomiting blood or having black stools  · Feeling more tired than usual  An inflamed and irritated stomach lining is more likely to develop a sore called an ulcer. To help prevent this, gastritis should be treated.  Home care  If needed, medicines may be prescribed. If you have H pylori infection, treating it will likely relieve your symptoms. Other changes can help reduce stomach irritation and help it heal.  · If you have been prescribed medicines for H pylori infection,  take them as directed. Take all of the medicine until it is finished or your healthcare provider tells you to stop, even if you feel better.  · Your healthcare provider may recommend avoiding NSAIDs. If you take daily aspirin for your heart or other medical reasons, do not stop without talking to your healthcare provider first.  · Avoid drinking alcohol.  · Stop smoking. Smoking can irritate the stomach and delay healing. As much as possible, stay away from second hand smoke.  Follow-up care  Follow up with your healthcare provider, or as advised by our staff. Testing may be needed to check for inflammation or an ulcer.  When to seek medical advice  Call your healthcare provider for any of the following:  · Stomach pain that gets worse or moves to the lower right abdomen (appendix area)  · Chest pain that appears or gets worse, or spreads to the back, neck, shoulder, or arm  · Frequent vomiting (cant keep down liquids)  · Blood in the stool or vomit (red or black in color)  · Feeling weak or dizzy  · Fever of 100.4ºF (38ºC) or higher, or as directed by your healthcare provider  Date Last Reviewed: 6/22/2015  © 9841-9735 [employer]. [address] [place] [address], [place] [address]. All rights reserved. This information is not intended as a substitute for professional medical care. Always follow your healthcare professional's instructions.

## 2020-12-29 LAB
FINAL PATHOLOGIC DIAGNOSIS: NORMAL
GROSS: NORMAL
Lab: NORMAL
MICROSCOPIC EXAM: NORMAL

## 2020-12-30 ENCOUNTER — HOSPITAL ENCOUNTER (OUTPATIENT)
Facility: HOSPITAL | Age: 55
Discharge: HOME | End: 2020-12-30
Attending: INTERNAL MEDICINE | Admitting: INTERNAL MEDICINE
Payer: COMMERCIAL

## 2020-12-30 VITALS
WEIGHT: 260 LBS | TEMPERATURE: 98 F | DIASTOLIC BLOOD PRESSURE: 104 MMHG | SYSTOLIC BLOOD PRESSURE: 142 MMHG | OXYGEN SATURATION: 97 % | HEIGHT: 67 IN | RESPIRATION RATE: 16 BRPM | BODY MASS INDEX: 40.81 KG/M2 | HEART RATE: 83 BPM

## 2020-12-30 DIAGNOSIS — D50.9 IRON DEFICIENCY ANEMIA, UNSPECIFIED IRON DEFICIENCY ANEMIA TYPE: Primary | ICD-10-CM

## 2020-12-30 PROCEDURE — 91110 GI TRC IMG INTRAL ESOPH-ILE: CPT | Performed by: INTERNAL MEDICINE

## 2020-12-30 PROCEDURE — 25000003 PHARM REV CODE 250: Performed by: INTERNAL MEDICINE

## 2020-12-30 PROCEDURE — 91110 PR GI TRACT CAPSULE ENDOSCOPY: ICD-10-PCS | Mod: 26,,, | Performed by: INTERNAL MEDICINE

## 2020-12-30 PROCEDURE — 91110 GI TRC IMG INTRAL ESOPH-ILE: CPT | Mod: 26,,, | Performed by: INTERNAL MEDICINE

## 2020-12-30 RX ORDER — DEXTROMETHORPHAN/PSEUDOEPHED 2.5-7.5/.8
40 DROPS ORAL ONCE
Status: COMPLETED | OUTPATIENT
Start: 2020-12-30 | End: 2020-12-30

## 2020-12-30 RX ADMIN — SIMETHICONE 40 MG: 20 SUSPENSION/ DROPS ORAL at 07:12

## 2021-01-26 ENCOUNTER — HOSPITAL ENCOUNTER (OUTPATIENT)
Dept: RADIOLOGY | Facility: HOSPITAL | Age: 56
Discharge: HOME | End: 2021-01-26
Attending: NURSE PRACTITIONER
Payer: COMMERCIAL

## 2021-01-26 DIAGNOSIS — R04.2 HEMOPTYSIS: ICD-10-CM

## 2021-01-26 DIAGNOSIS — U07.1 COVID-19 VIRUS INFECTION: ICD-10-CM

## 2021-01-27 ENCOUNTER — HOSPITAL ENCOUNTER (EMERGENCY)
Facility: HOSPITAL | Age: 56
Discharge: HOME | End: 2021-01-27
Attending: INTERNAL MEDICINE
Payer: COMMERCIAL

## 2021-01-27 VITALS
SYSTOLIC BLOOD PRESSURE: 140 MMHG | HEART RATE: 94 BPM | RESPIRATION RATE: 20 BRPM | OXYGEN SATURATION: 91 % | TEMPERATURE: 98 F | WEIGHT: 250 LBS | BODY MASS INDEX: 39.24 KG/M2 | DIASTOLIC BLOOD PRESSURE: 93 MMHG | HEIGHT: 67 IN

## 2021-01-27 DIAGNOSIS — I82.401 DEEP VEIN THROMBOSIS (DVT) OF RIGHT LOWER EXTREMITY, UNSPECIFIED CHRONICITY, UNSPECIFIED VEIN: ICD-10-CM

## 2021-01-27 DIAGNOSIS — R06.02 SOB (SHORTNESS OF BREATH): ICD-10-CM

## 2021-01-27 DIAGNOSIS — U07.1 COVID-19 VIRUS INFECTION: Primary | ICD-10-CM

## 2021-01-27 LAB
ALBUMIN SERPL BCP-MCNC: 3.3 G/DL (ref 3.5–5.2)
ALP SERPL-CCNC: 51 U/L (ref 55–135)
ALT SERPL W/O P-5'-P-CCNC: 19 U/L (ref 10–44)
ANION GAP SERPL CALC-SCNC: 10 MMOL/L (ref 8–16)
AST SERPL-CCNC: 33 U/L (ref 10–40)
BASOPHILS # BLD AUTO: 0.01 K/UL (ref 0–0.2)
BASOPHILS NFR BLD: 0.2 % (ref 0–1.9)
BILIRUB SERPL-MCNC: 1 MG/DL (ref 0.1–1)
BNP SERPL-MCNC: 17 PG/ML (ref 0–99)
BUN SERPL-MCNC: 11 MG/DL (ref 6–20)
CALCIUM SERPL-MCNC: 8.6 MG/DL (ref 8.7–10.5)
CHLORIDE SERPL-SCNC: 95 MMOL/L (ref 95–110)
CO2 SERPL-SCNC: 34 MMOL/L (ref 23–29)
CREAT SERPL-MCNC: 1.1 MG/DL (ref 0.5–1.4)
CRP SERPL-MCNC: 6.51 MG/DL (ref 0–0.75)
D DIMER PPP IA.FEU-MCNC: 0.83 MG/L FEU
DIFFERENTIAL METHOD: ABNORMAL
EOSINOPHIL # BLD AUTO: 0 K/UL (ref 0–0.5)
EOSINOPHIL NFR BLD: 0.7 % (ref 0–8)
ERYTHROCYTE [DISTWIDTH] IN BLOOD BY AUTOMATED COUNT: 15.9 % (ref 11.5–14.5)
EST. GFR  (AFRICAN AMERICAN): >60 ML/MIN/1.73 M^2
EST. GFR  (NON AFRICAN AMERICAN): >60 ML/MIN/1.73 M^2
GLUCOSE SERPL-MCNC: 150 MG/DL (ref 70–110)
HCT VFR BLD AUTO: 35.6 % (ref 40–54)
HGB BLD-MCNC: 11.6 G/DL (ref 14–18)
IMM GRANULOCYTES # BLD AUTO: 0.03 K/UL (ref 0–0.04)
IMM GRANULOCYTES NFR BLD AUTO: 0.5 % (ref 0–0.5)
INR PPP: 1.1 (ref 0.8–1.2)
LACTATE SERPL-SCNC: 1.5 MMOL/L (ref 0.5–2.2)
LYMPHOCYTES # BLD AUTO: 0.7 K/UL (ref 1–4.8)
LYMPHOCYTES NFR BLD: 13 % (ref 18–48)
MCH RBC QN AUTO: 27.3 PG (ref 27–31)
MCHC RBC AUTO-ENTMCNC: 32.6 G/DL (ref 32–36)
MCV RBC AUTO: 84 FL (ref 82–98)
MONOCYTES # BLD AUTO: 0.3 K/UL (ref 0.3–1)
MONOCYTES NFR BLD: 5.3 % (ref 4–15)
MYCOPLASMA AB SER QL: NORMAL
NEUTROPHILS # BLD AUTO: 4.4 K/UL (ref 1.8–7.7)
NEUTROPHILS NFR BLD: 80.3 % (ref 38–73)
NRBC BLD-RTO: 0 /100 WBC
PLATELET # BLD AUTO: 313 K/UL (ref 150–350)
PMV BLD AUTO: 10.6 FL (ref 9.2–12.9)
POTASSIUM SERPL-SCNC: 3.2 MMOL/L (ref 3.5–5.1)
PROCALCITONIN SERPL IA-MCNC: 0.23 NG/ML
PROT SERPL-MCNC: 7.5 G/DL (ref 6–8.4)
PROTHROMBIN TIME: 11.5 SEC (ref 9–12.5)
RBC # BLD AUTO: 4.25 M/UL (ref 4.6–6.2)
SODIUM SERPL-SCNC: 139 MMOL/L (ref 136–145)
TROPONIN I SERPL DL<=0.01 NG/ML-MCNC: 0.01 NG/ML (ref 0.02–0.5)
WBC # BLD AUTO: 5.47 K/UL (ref 3.9–12.7)

## 2021-01-27 PROCEDURE — 86738 MYCOPLASMA ANTIBODY: CPT

## 2021-01-27 PROCEDURE — 83880 ASSAY OF NATRIURETIC PEPTIDE: CPT

## 2021-01-27 PROCEDURE — 99285 EMERGENCY DEPT VISIT HI MDM: CPT | Mod: 25

## 2021-01-27 PROCEDURE — 93010 EKG 12-LEAD: ICD-10-PCS | Mod: ,,, | Performed by: INTERNAL MEDICINE

## 2021-01-27 PROCEDURE — 84484 ASSAY OF TROPONIN QUANT: CPT

## 2021-01-27 PROCEDURE — 84145 PROCALCITONIN (PCT): CPT

## 2021-01-27 PROCEDURE — 93005 ELECTROCARDIOGRAM TRACING: CPT

## 2021-01-27 PROCEDURE — 25000003 PHARM REV CODE 250: Performed by: INTERNAL MEDICINE

## 2021-01-27 PROCEDURE — 71045 X-RAY EXAM CHEST 1 VIEW: CPT | Mod: 26,,, | Performed by: RADIOLOGY

## 2021-01-27 PROCEDURE — 80053 COMPREHEN METABOLIC PANEL: CPT

## 2021-01-27 PROCEDURE — 85025 COMPLETE CBC W/AUTO DIFF WBC: CPT

## 2021-01-27 PROCEDURE — 93010 ELECTROCARDIOGRAM REPORT: CPT | Mod: ,,, | Performed by: INTERNAL MEDICINE

## 2021-01-27 PROCEDURE — 87040 BLOOD CULTURE FOR BACTERIA: CPT

## 2021-01-27 PROCEDURE — 63600175 PHARM REV CODE 636 W HCPCS: Performed by: INTERNAL MEDICINE

## 2021-01-27 PROCEDURE — 71045 XR CHEST AP PORTABLE: ICD-10-PCS | Mod: 26,,, | Performed by: RADIOLOGY

## 2021-01-27 PROCEDURE — 85610 PROTHROMBIN TIME: CPT

## 2021-01-27 PROCEDURE — 96374 THER/PROPH/DIAG INJ IV PUSH: CPT

## 2021-01-27 PROCEDURE — 85379 FIBRIN DEGRADATION QUANT: CPT

## 2021-01-27 PROCEDURE — 83605 ASSAY OF LACTIC ACID: CPT

## 2021-01-27 PROCEDURE — 86140 C-REACTIVE PROTEIN: CPT

## 2021-01-27 PROCEDURE — 82728 ASSAY OF FERRITIN: CPT

## 2021-01-27 PROCEDURE — 71045 X-RAY EXAM CHEST 1 VIEW: CPT | Mod: TC,FY

## 2021-01-27 RX ORDER — ASPIRIN 325 MG
650 TABLET, DELAYED RELEASE (ENTERIC COATED) ORAL
Status: COMPLETED | OUTPATIENT
Start: 2021-01-27 | End: 2021-01-27

## 2021-01-27 RX ORDER — DEXAMETHASONE SODIUM PHOSPHATE 100 MG/10ML
10 INJECTION INTRAMUSCULAR; INTRAVENOUS
Status: COMPLETED | OUTPATIENT
Start: 2021-01-27 | End: 2021-01-27

## 2021-01-27 RX ORDER — DEXAMETHASONE SODIUM PHOSPHATE 10 MG/ML
10 INJECTION INTRAMUSCULAR; INTRAVENOUS
Status: DISCONTINUED | OUTPATIENT
Start: 2021-01-27 | End: 2021-01-27

## 2021-01-27 RX ADMIN — DEXAMETHASONE SODIUM PHOSPHATE 10 MG: 10 INJECTION INTRAMUSCULAR; INTRAVENOUS at 12:01

## 2021-01-27 RX ADMIN — ASPIRIN 650 MG: 325 TABLET, COATED ORAL at 12:01

## 2021-01-27 RX ADMIN — RIVAROXABAN 10 MG: 10 TABLET, FILM COATED ORAL at 12:01

## 2021-01-28 LAB — FERRITIN SERPL-MCNC: 796 NG/ML (ref 20–300)

## 2021-02-02 LAB — BACTERIA BLD CULT: NORMAL

## 2021-02-08 ENCOUNTER — HOSPITAL ENCOUNTER (OUTPATIENT)
Dept: RADIOLOGY | Facility: HOSPITAL | Age: 56
Discharge: HOME | End: 2021-02-08
Attending: INTERNAL MEDICINE
Payer: COMMERCIAL

## 2021-04-20 ENCOUNTER — IMMUNIZATION (OUTPATIENT)
Dept: PRIMARY CARE CLINIC | Facility: CLINIC | Age: 56
End: 2021-04-20
Payer: COMMERCIAL

## 2021-04-20 DIAGNOSIS — Z23 NEED FOR VACCINATION: Primary | ICD-10-CM

## 2021-04-20 PROCEDURE — 91300 COVID-19, MRNA, LNP-S, PF, 30 MCG/0.3 ML DOSE VACCINE: ICD-10-PCS | Mod: S$GLB,,, | Performed by: FAMILY MEDICINE

## 2021-04-20 PROCEDURE — 91300 COVID-19, MRNA, LNP-S, PF, 30 MCG/0.3 ML DOSE VACCINE: CPT | Mod: S$GLB,,, | Performed by: FAMILY MEDICINE

## 2021-04-20 PROCEDURE — 0001A COVID-19, MRNA, LNP-S, PF, 30 MCG/0.3 ML DOSE VACCINE: CPT | Mod: CV19,S$GLB,, | Performed by: FAMILY MEDICINE

## 2021-04-20 PROCEDURE — 0001A COVID-19, MRNA, LNP-S, PF, 30 MCG/0.3 ML DOSE VACCINE: ICD-10-PCS | Mod: CV19,S$GLB,, | Performed by: FAMILY MEDICINE

## 2021-04-26 ENCOUNTER — OFFICE VISIT (OUTPATIENT)
Dept: PODIATRY | Facility: CLINIC | Age: 56
End: 2021-04-26
Payer: COMMERCIAL

## 2021-04-26 VITALS
DIASTOLIC BLOOD PRESSURE: 93 MMHG | HEIGHT: 67 IN | TEMPERATURE: 98 F | WEIGHT: 266 LBS | HEART RATE: 83 BPM | BODY MASS INDEX: 41.75 KG/M2 | SYSTOLIC BLOOD PRESSURE: 139 MMHG | RESPIRATION RATE: 18 BRPM

## 2021-04-26 DIAGNOSIS — B35.3 TINEA PEDIS OF BOTH FEET: Primary | ICD-10-CM

## 2021-04-26 DIAGNOSIS — E11.9 TYPE 2 DIABETES MELLITUS WITHOUT COMPLICATION, WITHOUT LONG-TERM CURRENT USE OF INSULIN: ICD-10-CM

## 2021-04-26 DIAGNOSIS — M20.11 HALLUX VALGUS OF RIGHT FOOT: ICD-10-CM

## 2021-04-26 PROCEDURE — 1125F AMNT PAIN NOTED PAIN PRSNT: CPT | Mod: S$GLB,,, | Performed by: PODIATRIST

## 2021-04-26 PROCEDURE — 99203 PR OFFICE/OUTPT VISIT, NEW, LEVL III, 30-44 MIN: ICD-10-PCS | Mod: S$GLB,,, | Performed by: PODIATRIST

## 2021-04-26 PROCEDURE — 3008F BODY MASS INDEX DOCD: CPT | Mod: S$GLB,,, | Performed by: PODIATRIST

## 2021-04-26 PROCEDURE — 99999 PR PBB SHADOW E&M-EST. PATIENT-LVL IV: ICD-10-PCS | Mod: PBBFAC,,, | Performed by: PODIATRIST

## 2021-04-26 PROCEDURE — 99203 OFFICE O/P NEW LOW 30 MIN: CPT | Mod: S$GLB,,, | Performed by: PODIATRIST

## 2021-04-26 PROCEDURE — 3051F HG A1C>EQUAL 7.0%<8.0%: CPT | Mod: S$GLB,,, | Performed by: PODIATRIST

## 2021-04-26 PROCEDURE — 3051F PR MOST RECENT HEMOGLOBIN A1C LEVEL 7.0 - < 8.0%: ICD-10-PCS | Mod: S$GLB,,, | Performed by: PODIATRIST

## 2021-04-26 PROCEDURE — 3008F PR BODY MASS INDEX (BMI) DOCUMENTED: ICD-10-PCS | Mod: S$GLB,,, | Performed by: PODIATRIST

## 2021-04-26 PROCEDURE — 1125F PR PAIN SEVERITY QUANTIFIED, PAIN PRESENT: ICD-10-PCS | Mod: S$GLB,,, | Performed by: PODIATRIST

## 2021-04-26 PROCEDURE — 99999 PR PBB SHADOW E&M-EST. PATIENT-LVL IV: CPT | Mod: PBBFAC,,, | Performed by: PODIATRIST

## 2021-04-26 RX ORDER — SUCRALFATE 1 G/1
1 TABLET ORAL 4 TIMES DAILY
COMMUNITY
Start: 2021-03-23 | End: 2024-02-05

## 2021-04-28 ENCOUNTER — TELEPHONE (OUTPATIENT)
Dept: PODIATRY | Facility: CLINIC | Age: 56
End: 2021-04-28

## 2021-04-28 PROBLEM — B35.3 TINEA PEDIS OF BOTH FEET: Status: ACTIVE | Noted: 2021-04-28

## 2021-04-28 RX ORDER — KETOCONAZOLE 20 MG/G
CREAM TOPICAL 2 TIMES DAILY
Qty: 60 G | Refills: 2 | Status: SHIPPED | OUTPATIENT
Start: 2021-04-28 | End: 2021-08-11 | Stop reason: SDUPTHER

## 2021-04-30 PROBLEM — E11.9 TYPE 2 DIABETES MELLITUS WITHOUT COMPLICATION, WITHOUT LONG-TERM CURRENT USE OF INSULIN: Status: ACTIVE | Noted: 2021-04-30

## 2021-04-30 PROBLEM — M20.11 HALLUX VALGUS OF RIGHT FOOT: Status: ACTIVE | Noted: 2021-04-30

## 2021-05-10 ENCOUNTER — IMMUNIZATION (OUTPATIENT)
Dept: PRIMARY CARE CLINIC | Facility: CLINIC | Age: 56
End: 2021-05-10
Payer: COMMERCIAL

## 2021-05-10 DIAGNOSIS — Z23 NEED FOR VACCINATION: Primary | ICD-10-CM

## 2021-05-10 PROCEDURE — 0002A COVID-19, MRNA, LNP-S, PF, 30 MCG/0.3 ML DOSE VACCINE: CPT | Mod: CV19,S$GLB,, | Performed by: FAMILY MEDICINE

## 2021-05-10 PROCEDURE — 91300 COVID-19, MRNA, LNP-S, PF, 30 MCG/0.3 ML DOSE VACCINE: ICD-10-PCS | Mod: S$GLB,,, | Performed by: FAMILY MEDICINE

## 2021-05-10 PROCEDURE — 91300 COVID-19, MRNA, LNP-S, PF, 30 MCG/0.3 ML DOSE VACCINE: CPT | Mod: S$GLB,,, | Performed by: FAMILY MEDICINE

## 2021-05-10 PROCEDURE — 0002A COVID-19, MRNA, LNP-S, PF, 30 MCG/0.3 ML DOSE VACCINE: ICD-10-PCS | Mod: CV19,S$GLB,, | Performed by: FAMILY MEDICINE

## 2021-05-20 ENCOUNTER — PROCEDURE VISIT (OUTPATIENT)
Dept: RESPIRATORY THERAPY | Facility: HOSPITAL | Age: 56
End: 2021-05-20
Attending: NURSE PRACTITIONER
Payer: COMMERCIAL

## 2021-05-20 DIAGNOSIS — R05.9 COUGH: ICD-10-CM

## 2021-05-20 PROCEDURE — 94060 EVALUATION OF WHEEZING: CPT

## 2021-05-20 PROCEDURE — 25000242 PHARM REV CODE 250 ALT 637 W/ HCPCS: Performed by: NURSE PRACTITIONER

## 2021-05-20 PROCEDURE — 94729 DIFFUSING CAPACITY: CPT

## 2021-05-20 PROCEDURE — 94727 GAS DIL/WSHOT DETER LNG VOL: CPT

## 2021-05-20 PROCEDURE — 94760 N-INVAS EAR/PLS OXIMETRY 1: CPT

## 2021-05-20 RX ORDER — ALBUTEROL SULFATE 0.83 MG/ML
2.5 SOLUTION RESPIRATORY (INHALATION) ONCE
Status: COMPLETED | OUTPATIENT
Start: 2021-05-20 | End: 2021-05-20

## 2021-05-20 RX ADMIN — ALBUTEROL SULFATE 2.5 MG: 2.5 SOLUTION RESPIRATORY (INHALATION) at 09:05

## 2021-05-21 LAB
BRPFT: NORMAL
DLCO ADJ PRE: 18.65 ML/(MIN*MMHG)
DLCO SINGLE BREATH LLN: 20.23
DLCO SINGLE BREATH PRE REF: 68.7 %
DLCO SINGLE BREATH REF: 27.16
DLCOC SBVA LLN: 2.88
DLCOC SBVA PRE REF: 102.8 %
DLCOC SBVA REF: 4.18
DLCOC SINGLE BREATH LLN: 20.23
DLCOC SINGLE BREATH PRE REF: 68.7 %
DLCOC SINGLE BREATH REF: 27.16
DLCOVA LLN: 2.88
DLCOVA PRE REF: 102.8 %
DLCOVA PRE: 4.29 ML/(MIN*MMHG*L)
DLCOVA REF: 4.18
DLVAADJ PRE: 4.29 ML/(MIN*MMHG*L)
ERVN2 LLN: -16448.85
ERVN2 PRE REF: 27.8 %
ERVN2 PRE: 0.32 L
ERVN2 REF: 1.15
FEF 25 75 CHG: 13.9 %
FEF 25 75 LLN: 1.09
FEF 25 75 POST REF: 106.2 %
FEF 25 75 PRE REF: 93.3 %
FEF 25 75 REF: 2.52
FET100 CHG: -15.8 %
FEV1 CHG: 7.2 %
FEV1 FVC CHG: 3 %
FEV1 FVC LLN: 68
FEV1 FVC POST REF: 101.8 %
FEV1 FVC PRE REF: 98.8 %
FEV1 FVC REF: 79
FEV1 LLN: 2.07
FEV1 POST REF: 92.2 %
FEV1 PRE REF: 86 %
FEV1 REF: 2.83
FRCN2 LLN: 2.41
FRCN2 PRE REF: 44.2 %
FRCN2 REF: 3.4
FVC CHG: 4.1 %
FVC LLN: 2.68
FVC POST REF: 90.6 %
FVC PRE REF: 87.1 %
FVC REF: 3.58
IVC PRE: 2.8 L
IVC SINGLE BREATH LLN: 2.68
IVC SINGLE BREATH PRE REF: 78.1 %
IVC SINGLE BREATH REF: 3.58
MVV LLN: 108
MVV PRE REF: 41.8 %
MVV REF: 127
PEF CHG: 15.6 %
PEF LLN: 5.37
PEF POST REF: 80.7 %
PEF PRE REF: 69.8 %
PEF REF: 7.8
POST FEF 25 75: 2.67 L/S
POST FET 100: 10.18 SEC
POST FEV1 FVC: 80.4 %
POST FEV1: 2.61 L
POST FVC: 3.25 L
POST PEF: 6.29 L/S
PRE DLCO: 18.65 ML/(MIN*MMHG)
PRE FEF 25 75: 2.35 L/S
PRE FET 100: 12.1 SEC
PRE FEV1 FVC: 78.06 %
PRE FEV1: 2.43 L
PRE FRC N2: 1.5 L
PRE FVC: 3.12 L
PRE MVV: 53 L/MIN
PRE PEF: 5.45 L/S
RVN2 LLN: 1.58
RVN2 PRE REF: 44.7 %
RVN2 PRE: 1.01 L
RVN2 REF: 2.25
RVN2TLCN2 LLN: 27.21
RVN2TLCN2 PRE REF: 67.5 %
RVN2TLCN2 PRE: 24.41 %
RVN2TLCN2 REF: 36.19
TLCN2 LLN: 5.35
TLCN2 PRE REF: 63.4 %
TLCN2 PRE: 4.13 L
TLCN2 REF: 6.5
VA PRE: 4.35 L
VA SINGLE BREATH LLN: 6.35
VA SINGLE BREATH PRE REF: 68.4 %
VA SINGLE BREATH REF: 6.35
VCMAXN2 LLN: 2.68
VCMAXN2 PRE REF: 87.1 %
VCMAXN2 PRE: 3.12 L
VCMAXN2 REF: 3.58

## 2021-06-02 ENCOUNTER — HOSPITAL ENCOUNTER (OUTPATIENT)
Dept: RADIOLOGY | Facility: HOSPITAL | Age: 56
Discharge: HOME | End: 2021-06-02
Attending: INTERNAL MEDICINE
Payer: COMMERCIAL

## 2021-06-02 PROCEDURE — 93970 US LOWER EXTREMITY VEINS BILATERAL: ICD-10-PCS | Mod: 26,,, | Performed by: RADIOLOGY

## 2021-06-02 PROCEDURE — 93970 EXTREMITY STUDY: CPT | Mod: 26,,, | Performed by: RADIOLOGY

## 2021-06-02 PROCEDURE — 93970 EXTREMITY STUDY: CPT | Mod: TC

## 2021-06-08 ENCOUNTER — HOSPITAL ENCOUNTER (OUTPATIENT)
Dept: RADIOLOGY | Facility: HOSPITAL | Age: 56
Discharge: HOME | End: 2021-06-08
Attending: ORTHOPAEDIC SURGERY
Payer: COMMERCIAL

## 2021-06-08 ENCOUNTER — OFFICE VISIT (OUTPATIENT)
Dept: ORTHOPEDICS | Facility: CLINIC | Age: 56
End: 2021-06-08
Payer: COMMERCIAL

## 2021-06-08 VITALS — BODY MASS INDEX: 41.75 KG/M2 | WEIGHT: 266 LBS | RESPIRATION RATE: 18 BRPM | HEIGHT: 67 IN

## 2021-06-08 DIAGNOSIS — M17.12 PRIMARY OSTEOARTHRITIS OF LEFT KNEE: Primary | ICD-10-CM

## 2021-06-08 DIAGNOSIS — M25.562 LEFT KNEE PAIN, UNSPECIFIED CHRONICITY: ICD-10-CM

## 2021-06-08 DIAGNOSIS — M25.562 LEFT KNEE PAIN, UNSPECIFIED CHRONICITY: Primary | ICD-10-CM

## 2021-06-08 DIAGNOSIS — M25.562 CHRONIC PAIN OF LEFT KNEE: Primary | ICD-10-CM

## 2021-06-08 DIAGNOSIS — M25.512 LEFT SHOULDER PAIN, UNSPECIFIED CHRONICITY: Primary | ICD-10-CM

## 2021-06-08 DIAGNOSIS — G89.29 CHRONIC PAIN OF LEFT KNEE: Primary | ICD-10-CM

## 2021-06-08 DIAGNOSIS — M25.562 CHRONIC PAIN OF LEFT KNEE: ICD-10-CM

## 2021-06-08 DIAGNOSIS — G89.29 CHRONIC PAIN OF LEFT KNEE: ICD-10-CM

## 2021-06-08 PROCEDURE — 73562 XR KNEE ORTHO LEFT WITH FLEXION: ICD-10-PCS | Mod: 26,RT,, | Performed by: RADIOLOGY

## 2021-06-08 PROCEDURE — 99214 PR OFFICE/OUTPT VISIT, EST, LEVL IV, 30-39 MIN: ICD-10-PCS | Mod: 25,S$GLB,, | Performed by: ORTHOPAEDIC SURGERY

## 2021-06-08 PROCEDURE — 20610 LARGE JOINT ASPIRATION/INJECTION: L KNEE: ICD-10-PCS | Mod: LT,S$GLB,, | Performed by: ORTHOPAEDIC SURGERY

## 2021-06-08 PROCEDURE — 73564 X-RAY EXAM KNEE 4 OR MORE: CPT | Mod: 26,LT,, | Performed by: RADIOLOGY

## 2021-06-08 PROCEDURE — 73564 XR KNEE ORTHO LEFT WITH FLEXION: ICD-10-PCS | Mod: 26,LT,, | Performed by: RADIOLOGY

## 2021-06-08 PROCEDURE — 99999 PR PBB SHADOW E&M-EST. PATIENT-LVL IV: ICD-10-PCS | Mod: PBBFAC,,, | Performed by: ORTHOPAEDIC SURGERY

## 2021-06-08 PROCEDURE — 73564 X-RAY EXAM KNEE 4 OR MORE: CPT | Mod: TC,PN,LT

## 2021-06-08 PROCEDURE — 3008F PR BODY MASS INDEX (BMI) DOCUMENTED: ICD-10-PCS | Mod: CPTII,S$GLB,, | Performed by: ORTHOPAEDIC SURGERY

## 2021-06-08 PROCEDURE — 99999 PR PBB SHADOW E&M-EST. PATIENT-LVL IV: CPT | Mod: PBBFAC,,, | Performed by: ORTHOPAEDIC SURGERY

## 2021-06-08 PROCEDURE — 99214 OFFICE O/P EST MOD 30 MIN: CPT | Mod: 25,S$GLB,, | Performed by: ORTHOPAEDIC SURGERY

## 2021-06-08 PROCEDURE — 3008F BODY MASS INDEX DOCD: CPT | Mod: CPTII,S$GLB,, | Performed by: ORTHOPAEDIC SURGERY

## 2021-06-08 PROCEDURE — 20610 DRAIN/INJ JOINT/BURSA W/O US: CPT | Mod: LT,S$GLB,, | Performed by: ORTHOPAEDIC SURGERY

## 2021-06-08 PROCEDURE — 73562 X-RAY EXAM OF KNEE 3: CPT | Mod: 26,RT,, | Performed by: RADIOLOGY

## 2021-06-08 RX ORDER — TRIAMCINOLONE ACETONIDE 40 MG/ML
40 INJECTION, SUSPENSION INTRA-ARTICULAR; INTRAMUSCULAR
Status: DISCONTINUED | OUTPATIENT
Start: 2021-06-08 | End: 2021-06-08 | Stop reason: HOSPADM

## 2021-06-08 RX ORDER — IBUPROFEN 800 MG/1
800 TABLET ORAL 3 TIMES DAILY
Qty: 90 TABLET | Refills: 0 | Status: SHIPPED | OUTPATIENT
Start: 2021-06-08 | End: 2021-07-06 | Stop reason: SDUPTHER

## 2021-06-08 RX ADMIN — TRIAMCINOLONE ACETONIDE 40 MG: 40 INJECTION, SUSPENSION INTRA-ARTICULAR; INTRAMUSCULAR at 01:06

## 2021-06-11 ENCOUNTER — HOSPITAL ENCOUNTER (OUTPATIENT)
Dept: RADIOLOGY | Facility: HOSPITAL | Age: 56
Discharge: HOME | End: 2021-06-11
Attending: ORTHOPAEDIC SURGERY
Payer: COMMERCIAL

## 2021-06-11 DIAGNOSIS — M25.512 LEFT SHOULDER PAIN, UNSPECIFIED CHRONICITY: ICD-10-CM

## 2021-06-11 PROCEDURE — 73221 MRI JOINT UPR EXTREM W/O DYE: CPT | Mod: TC,LT

## 2021-06-11 PROCEDURE — 73221 MRI SHOULDER WITHOUT CONTRAST LEFT: ICD-10-PCS | Mod: 26,LT,, | Performed by: RADIOLOGY

## 2021-06-11 PROCEDURE — 73221 MRI JOINT UPR EXTREM W/O DYE: CPT | Mod: 26,LT,, | Performed by: RADIOLOGY

## 2021-06-17 DIAGNOSIS — M17.12 PRIMARY OSTEOARTHRITIS OF LEFT KNEE: Primary | ICD-10-CM

## 2021-06-22 ENCOUNTER — OFFICE VISIT (OUTPATIENT)
Dept: ORTHOPEDICS | Facility: CLINIC | Age: 56
End: 2021-06-22
Payer: COMMERCIAL

## 2021-06-22 VITALS — HEIGHT: 67 IN | BODY MASS INDEX: 41.75 KG/M2 | WEIGHT: 266 LBS | RESPIRATION RATE: 16 BRPM

## 2021-06-22 DIAGNOSIS — M75.102 LEFT ROTATOR CUFF TEAR ARTHROPATHY: Primary | ICD-10-CM

## 2021-06-22 DIAGNOSIS — M17.12 PRIMARY OSTEOARTHRITIS OF LEFT KNEE: ICD-10-CM

## 2021-06-22 DIAGNOSIS — M12.812 LEFT ROTATOR CUFF TEAR ARTHROPATHY: Primary | ICD-10-CM

## 2021-06-22 PROCEDURE — 3008F BODY MASS INDEX DOCD: CPT | Mod: CPTII,S$GLB,, | Performed by: ORTHOPAEDIC SURGERY

## 2021-06-22 PROCEDURE — 99214 OFFICE O/P EST MOD 30 MIN: CPT | Mod: 25,S$GLB,, | Performed by: ORTHOPAEDIC SURGERY

## 2021-06-22 PROCEDURE — 1125F PR PAIN SEVERITY QUANTIFIED, PAIN PRESENT: ICD-10-PCS | Mod: S$GLB,,, | Performed by: ORTHOPAEDIC SURGERY

## 2021-06-22 PROCEDURE — 99999 PR PBB SHADOW E&M-EST. PATIENT-LVL V: CPT | Mod: PBBFAC,,, | Performed by: ORTHOPAEDIC SURGERY

## 2021-06-22 PROCEDURE — 20610 DRAIN/INJ JOINT/BURSA W/O US: CPT | Mod: LT,S$GLB,, | Performed by: ORTHOPAEDIC SURGERY

## 2021-06-22 PROCEDURE — 20610 LARGE JOINT ASPIRATION/INJECTION: L KNEE: ICD-10-PCS | Mod: LT,S$GLB,, | Performed by: ORTHOPAEDIC SURGERY

## 2021-06-22 PROCEDURE — 99214 PR OFFICE/OUTPT VISIT, EST, LEVL IV, 30-39 MIN: ICD-10-PCS | Mod: 25,S$GLB,, | Performed by: ORTHOPAEDIC SURGERY

## 2021-06-22 PROCEDURE — 3008F PR BODY MASS INDEX (BMI) DOCUMENTED: ICD-10-PCS | Mod: CPTII,S$GLB,, | Performed by: ORTHOPAEDIC SURGERY

## 2021-06-22 PROCEDURE — 99999 PR PBB SHADOW E&M-EST. PATIENT-LVL V: ICD-10-PCS | Mod: PBBFAC,,, | Performed by: ORTHOPAEDIC SURGERY

## 2021-06-22 PROCEDURE — 1125F AMNT PAIN NOTED PAIN PRSNT: CPT | Mod: S$GLB,,, | Performed by: ORTHOPAEDIC SURGERY

## 2021-06-24 PROBLEM — M12.812 LEFT ROTATOR CUFF TEAR ARTHROPATHY: Status: ACTIVE | Noted: 2021-06-24

## 2021-06-24 PROBLEM — M75.102 LEFT ROTATOR CUFF TEAR ARTHROPATHY: Status: ACTIVE | Noted: 2021-06-24

## 2021-06-24 RX ORDER — MUPIROCIN 20 MG/G
OINTMENT TOPICAL
Status: CANCELLED | OUTPATIENT
Start: 2021-06-24

## 2021-07-02 ENCOUNTER — HOSPITAL ENCOUNTER (OUTPATIENT)
Dept: PREADMISSION TESTING | Facility: HOSPITAL | Age: 56
Discharge: HOME | End: 2021-07-02
Attending: ORTHOPAEDIC SURGERY
Payer: COMMERCIAL

## 2021-07-02 VITALS — HEIGHT: 67 IN | BODY MASS INDEX: 41.75 KG/M2 | WEIGHT: 266 LBS

## 2021-07-06 DIAGNOSIS — M25.562 CHRONIC PAIN OF LEFT KNEE: ICD-10-CM

## 2021-07-06 DIAGNOSIS — G89.29 CHRONIC PAIN OF LEFT KNEE: ICD-10-CM

## 2021-07-06 RX ORDER — IBUPROFEN 800 MG/1
800 TABLET ORAL 3 TIMES DAILY
Qty: 90 TABLET | Refills: 0 | Status: SHIPPED | OUTPATIENT
Start: 2021-07-06 | End: 2021-08-11

## 2021-07-07 ENCOUNTER — TELEPHONE (OUTPATIENT)
Dept: ORTHOPEDICS | Facility: CLINIC | Age: 56
End: 2021-07-07

## 2021-07-07 RX ORDER — OXYCODONE HYDROCHLORIDE 5 MG/1
5 TABLET ORAL ONCE AS NEEDED
Status: CANCELLED | OUTPATIENT
Start: 2021-07-07 | End: 2032-12-03

## 2021-07-07 RX ORDER — FENTANYL CITRATE 50 UG/ML
25 INJECTION, SOLUTION INTRAMUSCULAR; INTRAVENOUS EVERY 5 MIN PRN
Status: CANCELLED | OUTPATIENT
Start: 2021-07-07

## 2021-07-08 ENCOUNTER — HOSPITAL ENCOUNTER (OUTPATIENT)
Facility: HOSPITAL | Age: 56
Discharge: HOME | End: 2021-07-08
Attending: ORTHOPAEDIC SURGERY | Admitting: ORTHOPAEDIC SURGERY
Payer: COMMERCIAL

## 2021-07-08 VITALS
TEMPERATURE: 98 F | RESPIRATION RATE: 18 BRPM | SYSTOLIC BLOOD PRESSURE: 143 MMHG | HEART RATE: 84 BPM | WEIGHT: 266 LBS | OXYGEN SATURATION: 97 % | DIASTOLIC BLOOD PRESSURE: 89 MMHG | BODY MASS INDEX: 41.75 KG/M2 | HEIGHT: 67 IN

## 2021-07-08 DIAGNOSIS — M75.102 LEFT ROTATOR CUFF TEAR ARTHROPATHY: ICD-10-CM

## 2021-07-08 DIAGNOSIS — M12.812 LEFT ROTATOR CUFF TEAR ARTHROPATHY: ICD-10-CM

## 2021-07-08 PROCEDURE — 93010 EKG 12-LEAD: ICD-10-PCS | Mod: ,,, | Performed by: GENERAL PRACTICE

## 2021-07-08 PROCEDURE — 99499 UNLISTED E&M SERVICE: CPT | Mod: ,,, | Performed by: ORTHOPAEDIC SURGERY

## 2021-07-08 PROCEDURE — 27201423 OPTIME MED/SURG SUP & DEVICES STERILE SUPPLY: Performed by: ORTHOPAEDIC SURGERY

## 2021-07-08 PROCEDURE — 93005 ELECTROCARDIOGRAM TRACING: CPT

## 2021-07-08 PROCEDURE — 93010 ELECTROCARDIOGRAM REPORT: CPT | Mod: ,,, | Performed by: GENERAL PRACTICE

## 2021-07-08 PROCEDURE — 99499 NO LOS: ICD-10-PCS | Mod: ,,, | Performed by: ORTHOPAEDIC SURGERY

## 2021-07-08 RX ORDER — LIDOCAINE HYDROCHLORIDE 10 MG/ML
1 INJECTION, SOLUTION EPIDURAL; INFILTRATION; INTRACAUDAL; PERINEURAL ONCE
Status: DISCONTINUED | OUTPATIENT
Start: 2021-07-08 | End: 2021-07-08 | Stop reason: HOSPADM

## 2021-07-08 RX ORDER — MUPIROCIN 20 MG/G
OINTMENT TOPICAL
Status: DISCONTINUED | OUTPATIENT
Start: 2021-07-08 | End: 2021-07-08 | Stop reason: HOSPADM

## 2021-07-08 RX ORDER — SODIUM CHLORIDE, SODIUM LACTATE, POTASSIUM CHLORIDE, CALCIUM CHLORIDE 600; 310; 30; 20 MG/100ML; MG/100ML; MG/100ML; MG/100ML
INJECTION, SOLUTION INTRAVENOUS CONTINUOUS
Status: DISCONTINUED | OUTPATIENT
Start: 2021-07-08 | End: 2021-07-08 | Stop reason: HOSPADM

## 2021-08-08 DIAGNOSIS — M25.562 CHRONIC PAIN OF LEFT KNEE: ICD-10-CM

## 2021-08-08 DIAGNOSIS — G89.29 CHRONIC PAIN OF LEFT KNEE: ICD-10-CM

## 2021-08-09 ENCOUNTER — HOSPITAL ENCOUNTER (OUTPATIENT)
Dept: CARDIOLOGY | Facility: HOSPITAL | Age: 56
Discharge: HOME | End: 2021-08-09
Attending: INTERNAL MEDICINE
Payer: COMMERCIAL

## 2021-08-09 VITALS
WEIGHT: 266 LBS | BODY MASS INDEX: 41.75 KG/M2 | SYSTOLIC BLOOD PRESSURE: 182 MMHG | HEART RATE: 97 BPM | HEIGHT: 67 IN | DIASTOLIC BLOOD PRESSURE: 99 MMHG

## 2021-08-09 DIAGNOSIS — R94.39 ABNORMAL CARDIOVASCULAR STRESS TEST: ICD-10-CM

## 2021-08-09 LAB
AORTIC ROOT ANNULUS: 3.7 CM
AORTIC VALVE CUSP SEPERATION: 2.12 CM
BSA FOR ECHO PROCEDURE: 2.39 M2
CV ECHO LV RWT: 0.44 CM
CV STRESS BASE HR: 83 BPM
DIASTOLIC BLOOD PRESSURE: 94 MMHG
DOP CALC LVOT AREA: 4.6 CM2
DOP CALC LVOT DIAMETER: 2.41 CM
ECHO LV POSTERIOR WALL: 1.19 CM (ref 0.6–1.1)
EJECTION FRACTION: 61 %
FRACTIONAL SHORTENING: 33 % (ref 28–44)
INTERVENTRICULAR SEPTUM: 1.31 CM (ref 0.6–1.1)
LA MAJOR: 4.83 CM
LA MINOR: 3.61 CM
LEFT ATRIUM SIZE: 4.86 CM
LEFT ATRIUM VOLUME INDEX MOD: 9.4 ML/M2
LEFT ATRIUM VOLUME MOD: 21.45 CM3
LEFT INTERNAL DIMENSION IN SYSTOLE: 3.64 CM (ref 2.1–4)
LEFT VENTRICLE DIASTOLIC VOLUME INDEX: 63.43 ML/M2
LEFT VENTRICLE DIASTOLIC VOLUME: 144.61 ML
LEFT VENTRICLE MASS INDEX: 125 G/M2
LEFT VENTRICLE SYSTOLIC VOLUME INDEX: 24.5 ML/M2
LEFT VENTRICLE SYSTOLIC VOLUME: 55.87 ML
LEFT VENTRICULAR INTERNAL DIMENSION IN DIASTOLE: 5.45 CM (ref 3.5–6)
LEFT VENTRICULAR MASS: 283.96 G
OHS CV CPX 1 MINUTE RECOVERY HEART RATE: 127 BPM
OHS CV CPX 85 PERCENT MAX PREDICTED HEART RATE MALE: 138
OHS CV CPX ESTIMATED METS: 9
OHS CV CPX MAX PREDICTED HEART RATE: 162
OHS CV CPX PATIENT IS FEMALE: 0
OHS CV CPX PATIENT IS MALE: 1
OHS CV CPX PEAK DIASTOLIC BLOOD PRESSURE: 100 MMHG
OHS CV CPX PEAK HEAR RATE: 151 BPM
OHS CV CPX PEAK RATE PRESSURE PRODUCT: ABNORMAL
OHS CV CPX PEAK SYSTOLIC BLOOD PRESSURE: 191 MMHG
OHS CV CPX PERCENT MAX PREDICTED HEART RATE ACHIEVED: 93
OHS CV CPX RATE PRESSURE PRODUCT PRESENTING: ABNORMAL
RA MAJOR: 4.14 CM
RA WIDTH: 3.2 CM
RIGHT VENTRICULAR END-DIASTOLIC DIMENSION: 3.94 CM
STRESS ECHO POST EXERCISE DUR MIN: 6 MINUTES
STRESS ECHO POST EXERCISE DUR SEC: 2 SECONDS
SYSTOLIC BLOOD PRESSURE: 143 MMHG

## 2021-08-09 PROCEDURE — 93351 STRESS TTE COMPLETE: CPT | Mod: 26,,, | Performed by: INTERNAL MEDICINE

## 2021-08-09 PROCEDURE — 93351 STRESS ECHO (CUPID ONLY): ICD-10-PCS | Mod: 26,,, | Performed by: INTERNAL MEDICINE

## 2021-08-09 PROCEDURE — 93351 STRESS TTE COMPLETE: CPT

## 2021-08-11 ENCOUNTER — TELEPHONE (OUTPATIENT)
Dept: PODIATRY | Facility: CLINIC | Age: 56
End: 2021-08-11

## 2021-08-11 ENCOUNTER — TELEPHONE (OUTPATIENT)
Dept: ORTHOPEDICS | Facility: CLINIC | Age: 56
End: 2021-08-11

## 2021-08-11 RX ORDER — IBUPROFEN 800 MG/1
800 TABLET ORAL 3 TIMES DAILY
Qty: 90 TABLET | Refills: 0 | Status: SHIPPED | OUTPATIENT
Start: 2021-08-11 | End: 2021-09-07

## 2021-08-11 RX ORDER — KETOCONAZOLE 20 MG/G
CREAM TOPICAL 2 TIMES DAILY
Qty: 60 G | Refills: 2 | Status: SHIPPED | OUTPATIENT
Start: 2021-08-11 | End: 2021-11-01 | Stop reason: SDUPTHER

## 2021-11-01 ENCOUNTER — TELEPHONE (OUTPATIENT)
Dept: PODIATRY | Facility: CLINIC | Age: 56
End: 2021-11-01
Payer: COMMERCIAL

## 2021-11-01 RX ORDER — KETOCONAZOLE 20 MG/G
CREAM TOPICAL 2 TIMES DAILY
Qty: 60 G | Refills: 2 | Status: SHIPPED | OUTPATIENT
Start: 2021-11-01 | End: 2022-08-24 | Stop reason: SDUPTHER

## 2021-11-23 DIAGNOSIS — M25.512 LEFT SHOULDER PAIN, UNSPECIFIED CHRONICITY: Primary | ICD-10-CM

## 2021-11-29 ENCOUNTER — OFFICE VISIT (OUTPATIENT)
Dept: ORTHOPEDICS | Facility: CLINIC | Age: 56
End: 2021-11-29
Payer: COMMERCIAL

## 2021-11-29 ENCOUNTER — HOSPITAL ENCOUNTER (OUTPATIENT)
Dept: RADIOLOGY | Facility: HOSPITAL | Age: 56
Discharge: HOME | End: 2021-11-29
Attending: ORTHOPAEDIC SURGERY
Payer: COMMERCIAL

## 2021-11-29 VITALS — RESPIRATION RATE: 17 BRPM | HEIGHT: 67 IN | BODY MASS INDEX: 41.75 KG/M2 | WEIGHT: 266 LBS

## 2021-11-29 DIAGNOSIS — M25.512 LEFT SHOULDER PAIN, UNSPECIFIED CHRONICITY: ICD-10-CM

## 2021-11-29 DIAGNOSIS — M12.812 LEFT ROTATOR CUFF TEAR ARTHROPATHY: Primary | ICD-10-CM

## 2021-11-29 DIAGNOSIS — M75.102 LEFT ROTATOR CUFF TEAR ARTHROPATHY: Primary | ICD-10-CM

## 2021-11-29 PROCEDURE — 99999 PR PBB SHADOW E&M-EST. PATIENT-LVL III: ICD-10-PCS | Mod: PBBFAC,,, | Performed by: ORTHOPAEDIC SURGERY

## 2021-11-29 PROCEDURE — 99214 OFFICE O/P EST MOD 30 MIN: CPT | Mod: S$GLB,,, | Performed by: ORTHOPAEDIC SURGERY

## 2021-11-29 PROCEDURE — 99214 PR OFFICE/OUTPT VISIT, EST, LEVL IV, 30-39 MIN: ICD-10-PCS | Mod: S$GLB,,, | Performed by: ORTHOPAEDIC SURGERY

## 2021-11-29 PROCEDURE — 4010F PR ACE/ARB THEARPY RXD/TAKEN: ICD-10-PCS | Mod: CPTII,S$GLB,, | Performed by: ORTHOPAEDIC SURGERY

## 2021-11-29 PROCEDURE — 4010F ACE/ARB THERAPY RXD/TAKEN: CPT | Mod: CPTII,S$GLB,, | Performed by: ORTHOPAEDIC SURGERY

## 2021-11-29 PROCEDURE — 73030 X-RAY EXAM OF SHOULDER: CPT | Mod: 26,LT,, | Performed by: RADIOLOGY

## 2021-11-29 PROCEDURE — 99999 PR PBB SHADOW E&M-EST. PATIENT-LVL III: CPT | Mod: PBBFAC,,, | Performed by: ORTHOPAEDIC SURGERY

## 2021-11-29 PROCEDURE — 73030 XR SHOULDER TRAUMA 3 VIEW LEFT: ICD-10-PCS | Mod: 26,LT,, | Performed by: RADIOLOGY

## 2021-11-29 PROCEDURE — 73030 X-RAY EXAM OF SHOULDER: CPT | Mod: TC,PN,LT

## 2021-11-29 RX ORDER — MUPIROCIN 20 MG/G
OINTMENT TOPICAL
Status: CANCELLED | OUTPATIENT
Start: 2021-11-29

## 2021-11-29 RX ORDER — CEFAZOLIN SODIUM/D5W 2 G/100 ML
2 PLASTIC BAG, INJECTION (ML) INTRAVENOUS
Status: CANCELLED | OUTPATIENT
Start: 2021-11-29

## 2021-12-01 ENCOUNTER — TELEPHONE (OUTPATIENT)
Dept: ORTHOPEDICS | Facility: CLINIC | Age: 56
End: 2021-12-01
Payer: COMMERCIAL

## 2021-12-09 ENCOUNTER — HOSPITAL ENCOUNTER (OUTPATIENT)
Dept: PREADMISSION TESTING | Facility: HOSPITAL | Age: 56
Discharge: HOME | End: 2021-12-09
Attending: ORTHOPAEDIC SURGERY
Payer: COMMERCIAL

## 2021-12-09 VITALS — WEIGHT: 265 LBS | HEIGHT: 67 IN | BODY MASS INDEX: 41.59 KG/M2

## 2021-12-09 DIAGNOSIS — M12.812 LEFT ROTATOR CUFF TEAR ARTHROPATHY: ICD-10-CM

## 2021-12-09 DIAGNOSIS — M75.102 LEFT ROTATOR CUFF TEAR ARTHROPATHY: ICD-10-CM

## 2021-12-09 PROCEDURE — 99900103 DSU ONLY-NO CHARGE-INITIAL HR (STAT)

## 2021-12-09 PROCEDURE — 99900104 DSU ONLY-NO CHARGE-EA ADD'L HR (STAT)

## 2021-12-15 ENCOUNTER — ANESTHESIA EVENT (OUTPATIENT)
Dept: SURGERY | Facility: HOSPITAL | Age: 56
End: 2021-12-15
Payer: COMMERCIAL

## 2021-12-16 ENCOUNTER — HOSPITAL ENCOUNTER (OUTPATIENT)
Facility: HOSPITAL | Age: 56
Discharge: HOME | End: 2021-12-16
Attending: ORTHOPAEDIC SURGERY | Admitting: ORTHOPAEDIC SURGERY
Payer: COMMERCIAL

## 2021-12-16 ENCOUNTER — ANESTHESIA (OUTPATIENT)
Dept: SURGERY | Facility: HOSPITAL | Age: 56
End: 2021-12-16
Payer: COMMERCIAL

## 2021-12-16 DIAGNOSIS — M12.812 LEFT ROTATOR CUFF TEAR ARTHROPATHY: Primary | ICD-10-CM

## 2021-12-16 DIAGNOSIS — M75.102 LEFT ROTATOR CUFF TEAR ARTHROPATHY: Primary | ICD-10-CM

## 2021-12-16 PROCEDURE — 29826 SHO ARTHRS SRG DECOMPRESSION: CPT | Mod: LT,,, | Performed by: ORTHOPAEDIC SURGERY

## 2021-12-16 PROCEDURE — 76942 ECHO GUIDE FOR BIOPSY: CPT | Performed by: ANESTHESIOLOGY

## 2021-12-16 PROCEDURE — C1713 ANCHOR/SCREW BN/BN,TIS/BN: HCPCS | Performed by: ORTHOPAEDIC SURGERY

## 2021-12-16 PROCEDURE — D9220A PRA ANESTHESIA: ICD-10-PCS | Mod: ,,, | Performed by: NURSE ANESTHETIST, CERTIFIED REGISTERED

## 2021-12-16 PROCEDURE — 27201423 OPTIME MED/SURG SUP & DEVICES STERILE SUPPLY: Performed by: ORTHOPAEDIC SURGERY

## 2021-12-16 PROCEDURE — 25000003 PHARM REV CODE 250: Performed by: NURSE ANESTHETIST, CERTIFIED REGISTERED

## 2021-12-16 PROCEDURE — 63600175 PHARM REV CODE 636 W HCPCS: Performed by: ANESTHESIOLOGY

## 2021-12-16 PROCEDURE — 76942 ECHO GUIDE FOR BIOPSY: CPT | Mod: 26,,, | Performed by: ANESTHESIOLOGY

## 2021-12-16 PROCEDURE — D9220A PRA ANESTHESIA: Mod: ,,, | Performed by: ANESTHESIOLOGY

## 2021-12-16 PROCEDURE — 37000008 HC ANESTHESIA 1ST 15 MINUTES: Performed by: ORTHOPAEDIC SURGERY

## 2021-12-16 PROCEDURE — 63600175 PHARM REV CODE 636 W HCPCS

## 2021-12-16 PROCEDURE — 63600175 PHARM REV CODE 636 W HCPCS: Performed by: NURSE ANESTHETIST, CERTIFIED REGISTERED

## 2021-12-16 PROCEDURE — D9220A PRA ANESTHESIA: Mod: ,,, | Performed by: NURSE ANESTHETIST, CERTIFIED REGISTERED

## 2021-12-16 PROCEDURE — D9220A PRA ANESTHESIA: ICD-10-PCS | Mod: ,,, | Performed by: ANESTHESIOLOGY

## 2021-12-16 PROCEDURE — 64415 PR NERVE BLOCK INJ, ANES/STEROID, BRACHIAL PLEXUS, INCL IMAG GUIDANCE: ICD-10-PCS | Mod: 59,LT,, | Performed by: ANESTHESIOLOGY

## 2021-12-16 PROCEDURE — 29827 SHO ARTHRS SRG RT8TR CUF RPR: CPT | Mod: LT,,, | Performed by: ORTHOPAEDIC SURGERY

## 2021-12-16 PROCEDURE — 99900104 DSU ONLY-NO CHARGE-EA ADD'L HR (STAT): Performed by: ORTHOPAEDIC SURGERY

## 2021-12-16 PROCEDURE — 27200750 HC INSULATED NEEDLE/ STIMUPLEX: Performed by: ANESTHESIOLOGY

## 2021-12-16 PROCEDURE — 99900103 DSU ONLY-NO CHARGE-INITIAL HR (STAT): Performed by: ORTHOPAEDIC SURGERY

## 2021-12-16 PROCEDURE — 36000710: Performed by: ORTHOPAEDIC SURGERY

## 2021-12-16 PROCEDURE — 37000009 HC ANESTHESIA EA ADD 15 MINS: Performed by: ORTHOPAEDIC SURGERY

## 2021-12-16 PROCEDURE — 25000003 PHARM REV CODE 250: Performed by: ORTHOPAEDIC SURGERY

## 2021-12-16 PROCEDURE — 36000711: Performed by: ORTHOPAEDIC SURGERY

## 2021-12-16 PROCEDURE — 25000003 PHARM REV CODE 250: Performed by: ANESTHESIOLOGY

## 2021-12-16 PROCEDURE — 63600175 PHARM REV CODE 636 W HCPCS: Performed by: ORTHOPAEDIC SURGERY

## 2021-12-16 PROCEDURE — 29828 SHO ARTHRS SRG BICP TENODSIS: CPT | Mod: 51,LT,, | Performed by: ORTHOPAEDIC SURGERY

## 2021-12-16 PROCEDURE — 29827 PR SHLDR ARTHROSCOP,SURG,W/ROTAT CUFF REPR: ICD-10-PCS | Mod: LT,,, | Performed by: ORTHOPAEDIC SURGERY

## 2021-12-16 PROCEDURE — 64415 NJX AA&/STRD BRCH PLXS IMG: CPT | Mod: 59,LT,, | Performed by: ANESTHESIOLOGY

## 2021-12-16 PROCEDURE — 29828 PR ARTHROSCOPY SHOULDER SURGICAL BICEPS TENODESIS: ICD-10-PCS | Mod: 51,LT,, | Performed by: ORTHOPAEDIC SURGERY

## 2021-12-16 PROCEDURE — 76942 PR U/S GUIDANCE FOR NEEDLE GUIDANCE: ICD-10-PCS | Mod: 26,,, | Performed by: ANESTHESIOLOGY

## 2021-12-16 PROCEDURE — 71000015 HC POSTOP RECOV 1ST HR: Performed by: ORTHOPAEDIC SURGERY

## 2021-12-16 PROCEDURE — 71000033 HC RECOVERY, INTIAL HOUR: Performed by: ORTHOPAEDIC SURGERY

## 2021-12-16 PROCEDURE — 71000039 HC RECOVERY, EACH ADD'L HOUR: Performed by: ORTHOPAEDIC SURGERY

## 2021-12-16 PROCEDURE — C9290 INJ, BUPIVACAINE LIPOSOME: HCPCS | Performed by: ANESTHESIOLOGY

## 2021-12-16 PROCEDURE — 29826 PR SHLDR ARTHROSCOP,PART ACROMIOPLAS: ICD-10-PCS | Mod: LT,,, | Performed by: ORTHOPAEDIC SURGERY

## 2021-12-16 PROCEDURE — 71000016 HC POSTOP RECOV ADDL HR: Performed by: ORTHOPAEDIC SURGERY

## 2021-12-16 DEVICE — IMPLANTABLE DEVICE: Type: IMPLANTABLE DEVICE | Site: SHOULDER | Status: FUNCTIONAL

## 2021-12-16 DEVICE — SYS ANCHOR SUTURE 4.75MM SWVL: Type: IMPLANTABLE DEVICE | Site: SHOULDER | Status: FUNCTIONAL

## 2021-12-16 RX ORDER — ROCURONIUM BROMIDE 10 MG/ML
INJECTION, SOLUTION INTRAVENOUS
Status: DISCONTINUED | OUTPATIENT
Start: 2021-12-16 | End: 2021-12-16

## 2021-12-16 RX ORDER — ONDANSETRON 2 MG/ML
4 INJECTION INTRAMUSCULAR; INTRAVENOUS DAILY PRN
Status: DISCONTINUED | OUTPATIENT
Start: 2021-12-16 | End: 2022-05-10

## 2021-12-16 RX ORDER — ONDANSETRON HYDROCHLORIDE 2 MG/ML
INJECTION, SOLUTION INTRAMUSCULAR; INTRAVENOUS
Status: DISCONTINUED | OUTPATIENT
Start: 2021-12-16 | End: 2021-12-16

## 2021-12-16 RX ORDER — FENTANYL CITRATE 50 UG/ML
25 INJECTION, SOLUTION INTRAMUSCULAR; INTRAVENOUS EVERY 5 MIN PRN
Status: DISCONTINUED | OUTPATIENT
Start: 2021-12-16 | End: 2022-05-10

## 2021-12-16 RX ORDER — KETOROLAC TROMETHAMINE 30 MG/ML
INJECTION, SOLUTION INTRAMUSCULAR; INTRAVENOUS
Status: DISCONTINUED | OUTPATIENT
Start: 2021-12-16 | End: 2021-12-16

## 2021-12-16 RX ORDER — EPINEPHRINE 1 MG/ML
INJECTION, SOLUTION INTRACARDIAC; INTRAMUSCULAR; INTRAVENOUS; SUBCUTANEOUS
Status: DISCONTINUED | OUTPATIENT
Start: 2021-12-16 | End: 2021-12-16 | Stop reason: HOSPADM

## 2021-12-16 RX ORDER — MIDAZOLAM HYDROCHLORIDE 1 MG/ML
INJECTION INTRAMUSCULAR; INTRAVENOUS
Status: DISCONTINUED | OUTPATIENT
Start: 2021-12-16 | End: 2021-12-16

## 2021-12-16 RX ORDER — SODIUM CHLORIDE 0.9 % (FLUSH) 0.9 %
3 SYRINGE (ML) INJECTION EVERY 8 HOURS
Status: DISCONTINUED | OUTPATIENT
Start: 2021-12-16 | End: 2022-05-10

## 2021-12-16 RX ORDER — SODIUM CHLORIDE, SODIUM LACTATE, POTASSIUM CHLORIDE, CALCIUM CHLORIDE 600; 310; 30; 20 MG/100ML; MG/100ML; MG/100ML; MG/100ML
INJECTION, SOLUTION INTRAVENOUS CONTINUOUS
Status: DISCONTINUED | OUTPATIENT
Start: 2021-12-16 | End: 2022-05-10

## 2021-12-16 RX ORDER — DEXAMETHASONE SODIUM PHOSPHATE 4 MG/ML
INJECTION, SOLUTION INTRA-ARTICULAR; INTRALESIONAL; INTRAMUSCULAR; INTRAVENOUS; SOFT TISSUE
Status: DISCONTINUED | OUTPATIENT
Start: 2021-12-16 | End: 2021-12-16

## 2021-12-16 RX ORDER — OXYCODONE HYDROCHLORIDE 5 MG/1
5 TABLET ORAL
Status: DISCONTINUED | OUTPATIENT
Start: 2021-12-16 | End: 2022-05-10

## 2021-12-16 RX ORDER — HYDROMORPHONE HYDROCHLORIDE 2 MG/ML
0.2 INJECTION, SOLUTION INTRAMUSCULAR; INTRAVENOUS; SUBCUTANEOUS EVERY 5 MIN PRN
Status: DISCONTINUED | OUTPATIENT
Start: 2021-12-16 | End: 2022-05-10

## 2021-12-16 RX ORDER — LIDOCAINE HCL/PF 100 MG/5ML
SYRINGE (ML) INTRAVENOUS
Status: DISCONTINUED | OUTPATIENT
Start: 2021-12-16 | End: 2021-12-16

## 2021-12-16 RX ORDER — PHENYLEPHRINE HYDROCHLORIDE 10 MG/ML
INJECTION INTRAVENOUS
Status: DISCONTINUED | OUTPATIENT
Start: 2021-12-16 | End: 2021-12-16

## 2021-12-16 RX ORDER — IBUPROFEN 800 MG/1
800 TABLET ORAL 3 TIMES DAILY
Qty: 60 TABLET | Refills: 0 | Status: SHIPPED | OUTPATIENT
Start: 2021-12-16 | End: 2022-09-13

## 2021-12-16 RX ORDER — HYDROCODONE BITARTRATE AND ACETAMINOPHEN 10; 325 MG/1; MG/1
1 TABLET ORAL EVERY 4 HOURS PRN
Status: CANCELLED | OUTPATIENT
Start: 2021-12-16

## 2021-12-16 RX ORDER — SODIUM CHLORIDE 0.9 % (FLUSH) 0.9 %
3 SYRINGE (ML) INJECTION
Status: DISCONTINUED | OUTPATIENT
Start: 2021-12-16 | End: 2022-05-10

## 2021-12-16 RX ORDER — MEPERIDINE HYDROCHLORIDE 50 MG/ML
12.5 INJECTION INTRAMUSCULAR; INTRAVENOUS; SUBCUTANEOUS ONCE AS NEEDED
Status: ACTIVE | OUTPATIENT
Start: 2021-12-16 | End: 2021-12-17

## 2021-12-16 RX ORDER — FENTANYL CITRATE 50 UG/ML
INJECTION, SOLUTION INTRAMUSCULAR; INTRAVENOUS
Status: DISCONTINUED | OUTPATIENT
Start: 2021-12-16 | End: 2021-12-16

## 2021-12-16 RX ORDER — BUPIVACAINE HYDROCHLORIDE 5 MG/ML
INJECTION, SOLUTION EPIDURAL; INTRACAUDAL
Status: COMPLETED | OUTPATIENT
Start: 2021-12-16 | End: 2021-12-16

## 2021-12-16 RX ORDER — HYDROCODONE BITARTRATE AND ACETAMINOPHEN 5; 325 MG/1; MG/1
1 TABLET ORAL EVERY 4 HOURS PRN
Status: CANCELLED | OUTPATIENT
Start: 2021-12-16

## 2021-12-16 RX ORDER — IBUPROFEN 400 MG/1
800 TABLET ORAL 3 TIMES DAILY
Status: CANCELLED | OUTPATIENT
Start: 2021-12-16

## 2021-12-16 RX ORDER — DIPHENHYDRAMINE HYDROCHLORIDE 50 MG/ML
25 INJECTION INTRAMUSCULAR; INTRAVENOUS EVERY 6 HOURS PRN
Status: DISCONTINUED | OUTPATIENT
Start: 2021-12-16 | End: 2022-05-10

## 2021-12-16 RX ORDER — LIDOCAINE HYDROCHLORIDE 10 MG/ML
0.5 INJECTION, SOLUTION EPIDURAL; INFILTRATION; INTRACAUDAL; PERINEURAL ONCE
Status: DISCONTINUED | OUTPATIENT
Start: 2021-12-16 | End: 2022-05-10

## 2021-12-16 RX ORDER — CEFAZOLIN SODIUM 2 G/50ML
2 SOLUTION INTRAVENOUS
Status: COMPLETED | OUTPATIENT
Start: 2021-12-16 | End: 2021-12-16

## 2021-12-16 RX ORDER — MUPIROCIN 20 MG/G
OINTMENT TOPICAL
Status: DISCONTINUED | OUTPATIENT
Start: 2021-12-16 | End: 2021-12-16 | Stop reason: HOSPADM

## 2021-12-16 RX ORDER — OXYCODONE AND ACETAMINOPHEN 5; 325 MG/1; MG/1
1-2 TABLET ORAL EVERY 6 HOURS PRN
Qty: 30 TABLET | Refills: 0 | Status: SHIPPED | OUTPATIENT
Start: 2021-12-16 | End: 2021-12-30

## 2021-12-16 RX ORDER — SUCCINYLCHOLINE CHLORIDE 20 MG/ML
INJECTION INTRAMUSCULAR; INTRAVENOUS
Status: DISCONTINUED | OUTPATIENT
Start: 2021-12-16 | End: 2021-12-16

## 2021-12-16 RX ORDER — ACETAMINOPHEN 10 MG/ML
INJECTION, SOLUTION INTRAVENOUS
Status: DISCONTINUED | OUTPATIENT
Start: 2021-12-16 | End: 2021-12-16

## 2021-12-16 RX ORDER — PROPOFOL 10 MG/ML
VIAL (ML) INTRAVENOUS
Status: DISCONTINUED | OUTPATIENT
Start: 2021-12-16 | End: 2021-12-16

## 2021-12-16 RX ADMIN — PHENYLEPHRINE HYDROCHLORIDE 100 MCG: 10 INJECTION INTRAVENOUS at 08:12

## 2021-12-16 RX ADMIN — FENTANYL CITRATE 25 MCG: 50 INJECTION INTRAMUSCULAR; INTRAVENOUS at 10:12

## 2021-12-16 RX ADMIN — SODIUM CHLORIDE, SODIUM GLUCONATE, SODIUM ACETATE, POTASSIUM CHLORIDE, MAGNESIUM CHLORIDE, SODIUM PHOSPHATE, DIBASIC, AND POTASSIUM PHOSPHATE: .53; .5; .37; .037; .03; .012; .00082 INJECTION, SOLUTION INTRAVENOUS at 06:12

## 2021-12-16 RX ADMIN — OXYCODONE 5 MG: 5 TABLET ORAL at 10:12

## 2021-12-16 RX ADMIN — GLYCOPYRROLATE 0.2 MG: 0.2 INJECTION, SOLUTION INTRAMUSCULAR; INTRAVITREAL at 07:12

## 2021-12-16 RX ADMIN — BUPIVACAINE 10 ML: 13.3 INJECTION, SUSPENSION, LIPOSOMAL INFILTRATION at 07:12

## 2021-12-16 RX ADMIN — KETOROLAC TROMETHAMINE 15 MG: 30 INJECTION, SOLUTION INTRAMUSCULAR; INTRAVENOUS at 09:12

## 2021-12-16 RX ADMIN — LIDOCAINE HYDROCHLORIDE 100 MG: 20 INJECTION INTRAVENOUS at 07:12

## 2021-12-16 RX ADMIN — DEXAMETHASONE SODIUM PHOSPHATE 4 MG: 4 INJECTION, SOLUTION INTRA-ARTICULAR; INTRALESIONAL; INTRAMUSCULAR; INTRAVENOUS; SOFT TISSUE at 07:12

## 2021-12-16 RX ADMIN — ROCURONIUM BROMIDE 10 MG: 10 INJECTION, SOLUTION INTRAVENOUS at 07:12

## 2021-12-16 RX ADMIN — FENTANYL CITRATE 50 MCG: 50 INJECTION, SOLUTION INTRAMUSCULAR; INTRAVENOUS at 08:12

## 2021-12-16 RX ADMIN — MUPIROCIN: 20 OINTMENT TOPICAL at 06:12

## 2021-12-16 RX ADMIN — ONDANSETRON 4 MG: 2 INJECTION, SOLUTION INTRAMUSCULAR; INTRAVENOUS at 07:12

## 2021-12-16 RX ADMIN — MIDAZOLAM HYDROCHLORIDE 2 MG: 1 INJECTION, SOLUTION INTRAMUSCULAR; INTRAVENOUS at 07:12

## 2021-12-16 RX ADMIN — BUPIVACAINE HYDROCHLORIDE 5 ML: 5 INJECTION, SOLUTION EPIDURAL; INTRACAUDAL; PERINEURAL at 07:12

## 2021-12-16 RX ADMIN — SUGAMMADEX 100 MG: 100 INJECTION, SOLUTION INTRAVENOUS at 09:12

## 2021-12-16 RX ADMIN — FENTANYL CITRATE 50 MCG: 50 INJECTION, SOLUTION INTRAMUSCULAR; INTRAVENOUS at 09:12

## 2021-12-16 RX ADMIN — FENTANYL CITRATE 50 MCG: 50 INJECTION, SOLUTION INTRAMUSCULAR; INTRAVENOUS at 07:12

## 2021-12-16 RX ADMIN — ONDANSETRON 4 MG: 2 INJECTION, SOLUTION INTRAMUSCULAR; INTRAVENOUS at 09:12

## 2021-12-16 RX ADMIN — SUCCINYLCHOLINE CHLORIDE 160 MG: 20 INJECTION, SOLUTION INTRAMUSCULAR; INTRAVENOUS; PARENTERAL at 07:12

## 2021-12-16 RX ADMIN — PROPOFOL 200 MG: 10 INJECTION, EMULSION INTRAVENOUS at 07:12

## 2021-12-16 RX ADMIN — ACETAMINOPHEN 1000 MG: 10 INJECTION, SOLUTION INTRAVENOUS at 08:12

## 2021-12-16 RX ADMIN — CEFAZOLIN SODIUM 2 G: 2 SOLUTION INTRAVENOUS at 07:12

## 2021-12-17 VITALS
TEMPERATURE: 98 F | RESPIRATION RATE: 16 BRPM | BODY MASS INDEX: 41.59 KG/M2 | HEIGHT: 67 IN | HEART RATE: 83 BPM | DIASTOLIC BLOOD PRESSURE: 75 MMHG | OXYGEN SATURATION: 98 % | SYSTOLIC BLOOD PRESSURE: 137 MMHG | WEIGHT: 265 LBS

## 2021-12-30 ENCOUNTER — OFFICE VISIT (OUTPATIENT)
Dept: ORTHOPEDICS | Facility: CLINIC | Age: 56
End: 2021-12-30
Payer: COMMERCIAL

## 2021-12-30 VITALS — RESPIRATION RATE: 16 BRPM | WEIGHT: 265 LBS | BODY MASS INDEX: 41.59 KG/M2 | HEIGHT: 67 IN

## 2021-12-30 DIAGNOSIS — M75.102 LEFT ROTATOR CUFF TEAR ARTHROPATHY: Primary | ICD-10-CM

## 2021-12-30 DIAGNOSIS — M12.812 LEFT ROTATOR CUFF TEAR ARTHROPATHY: Primary | ICD-10-CM

## 2021-12-30 PROCEDURE — 1160F PR REVIEW ALL MEDS BY PRESCRIBER/CLIN PHARMACIST DOCUMENTED: ICD-10-PCS | Mod: CPTII,S$GLB,, | Performed by: ORTHOPAEDIC SURGERY

## 2021-12-30 PROCEDURE — 3052F PR MOST RECENT HEMOGLOBIN A1C LEVEL 8.0 - < 9.0%: ICD-10-PCS | Mod: CPTII,S$GLB,, | Performed by: ORTHOPAEDIC SURGERY

## 2021-12-30 PROCEDURE — 99024 PR POST-OP FOLLOW-UP VISIT: ICD-10-PCS | Mod: S$GLB,,, | Performed by: ORTHOPAEDIC SURGERY

## 2021-12-30 PROCEDURE — 99999 PR PBB SHADOW E&M-EST. PATIENT-LVL IV: CPT | Mod: PBBFAC,,, | Performed by: ORTHOPAEDIC SURGERY

## 2021-12-30 PROCEDURE — 3008F PR BODY MASS INDEX (BMI) DOCUMENTED: ICD-10-PCS | Mod: CPTII,S$GLB,, | Performed by: ORTHOPAEDIC SURGERY

## 2021-12-30 PROCEDURE — 4010F PR ACE/ARB THEARPY RXD/TAKEN: ICD-10-PCS | Mod: CPTII,S$GLB,, | Performed by: ORTHOPAEDIC SURGERY

## 2021-12-30 PROCEDURE — 1159F MED LIST DOCD IN RCRD: CPT | Mod: CPTII,S$GLB,, | Performed by: ORTHOPAEDIC SURGERY

## 2021-12-30 PROCEDURE — 1159F PR MEDICATION LIST DOCUMENTED IN MEDICAL RECORD: ICD-10-PCS | Mod: CPTII,S$GLB,, | Performed by: ORTHOPAEDIC SURGERY

## 2021-12-30 PROCEDURE — 4010F ACE/ARB THERAPY RXD/TAKEN: CPT | Mod: CPTII,S$GLB,, | Performed by: ORTHOPAEDIC SURGERY

## 2021-12-30 PROCEDURE — 3008F BODY MASS INDEX DOCD: CPT | Mod: CPTII,S$GLB,, | Performed by: ORTHOPAEDIC SURGERY

## 2021-12-30 PROCEDURE — 99999 PR PBB SHADOW E&M-EST. PATIENT-LVL IV: ICD-10-PCS | Mod: PBBFAC,,, | Performed by: ORTHOPAEDIC SURGERY

## 2021-12-30 PROCEDURE — 3052F HG A1C>EQUAL 8.0%<EQUAL 9.0%: CPT | Mod: CPTII,S$GLB,, | Performed by: ORTHOPAEDIC SURGERY

## 2021-12-30 PROCEDURE — 1160F RVW MEDS BY RX/DR IN RCRD: CPT | Mod: CPTII,S$GLB,, | Performed by: ORTHOPAEDIC SURGERY

## 2021-12-30 PROCEDURE — 99024 POSTOP FOLLOW-UP VISIT: CPT | Mod: S$GLB,,, | Performed by: ORTHOPAEDIC SURGERY

## 2021-12-30 RX ORDER — HYDROCODONE BITARTRATE AND ACETAMINOPHEN 7.5; 325 MG/1; MG/1
1 TABLET ORAL EVERY 6 HOURS PRN
Qty: 28 TABLET | Refills: 0 | Status: SHIPPED | OUTPATIENT
Start: 2021-12-30 | End: 2022-01-14 | Stop reason: SDUPTHER

## 2022-01-04 DIAGNOSIS — M12.812 LEFT ROTATOR CUFF TEAR ARTHROPATHY: Primary | ICD-10-CM

## 2022-01-04 DIAGNOSIS — M75.102 LEFT ROTATOR CUFF TEAR ARTHROPATHY: Primary | ICD-10-CM

## 2022-01-14 ENCOUNTER — OFFICE VISIT (OUTPATIENT)
Dept: ORTHOPEDICS | Facility: CLINIC | Age: 57
End: 2022-01-14
Payer: COMMERCIAL

## 2022-01-14 ENCOUNTER — HOSPITAL ENCOUNTER (OUTPATIENT)
Dept: RADIOLOGY | Facility: HOSPITAL | Age: 57
Discharge: HOME | End: 2022-01-14
Attending: ORTHOPAEDIC SURGERY
Payer: COMMERCIAL

## 2022-01-14 VITALS — WEIGHT: 265 LBS | HEIGHT: 67 IN | RESPIRATION RATE: 18 BRPM | BODY MASS INDEX: 41.59 KG/M2

## 2022-01-14 DIAGNOSIS — M12.812 LEFT ROTATOR CUFF TEAR ARTHROPATHY: ICD-10-CM

## 2022-01-14 DIAGNOSIS — M75.102 LEFT ROTATOR CUFF TEAR ARTHROPATHY: ICD-10-CM

## 2022-01-14 DIAGNOSIS — Z98.890 STATUS POST LEFT ROTATOR CUFF REPAIR: Primary | ICD-10-CM

## 2022-01-14 PROCEDURE — 73030 X-RAY EXAM OF SHOULDER: CPT | Mod: 26,LT,, | Performed by: RADIOLOGY

## 2022-01-14 PROCEDURE — 1159F MED LIST DOCD IN RCRD: CPT | Mod: CPTII,S$GLB,, | Performed by: ORTHOPAEDIC SURGERY

## 2022-01-14 PROCEDURE — 99999 PR PBB SHADOW E&M-EST. PATIENT-LVL III: ICD-10-PCS | Mod: PBBFAC,,, | Performed by: ORTHOPAEDIC SURGERY

## 2022-01-14 PROCEDURE — 3008F BODY MASS INDEX DOCD: CPT | Mod: CPTII,S$GLB,, | Performed by: ORTHOPAEDIC SURGERY

## 2022-01-14 PROCEDURE — 99024 POSTOP FOLLOW-UP VISIT: CPT | Mod: S$GLB,,, | Performed by: ORTHOPAEDIC SURGERY

## 2022-01-14 PROCEDURE — 99999 PR PBB SHADOW E&M-EST. PATIENT-LVL III: CPT | Mod: PBBFAC,,, | Performed by: ORTHOPAEDIC SURGERY

## 2022-01-14 PROCEDURE — 73030 X-RAY EXAM OF SHOULDER: CPT | Mod: TC,PN,LT

## 2022-01-14 PROCEDURE — 3008F PR BODY MASS INDEX (BMI) DOCUMENTED: ICD-10-PCS | Mod: CPTII,S$GLB,, | Performed by: ORTHOPAEDIC SURGERY

## 2022-01-14 PROCEDURE — 1160F PR REVIEW ALL MEDS BY PRESCRIBER/CLIN PHARMACIST DOCUMENTED: ICD-10-PCS | Mod: CPTII,S$GLB,, | Performed by: ORTHOPAEDIC SURGERY

## 2022-01-14 PROCEDURE — 99024 PR POST-OP FOLLOW-UP VISIT: ICD-10-PCS | Mod: S$GLB,,, | Performed by: ORTHOPAEDIC SURGERY

## 2022-01-14 PROCEDURE — 1160F RVW MEDS BY RX/DR IN RCRD: CPT | Mod: CPTII,S$GLB,, | Performed by: ORTHOPAEDIC SURGERY

## 2022-01-14 PROCEDURE — 73030 XR SHOULDER COMPLETE 2 OR MORE VIEWS LEFT: ICD-10-PCS | Mod: 26,LT,, | Performed by: RADIOLOGY

## 2022-01-14 PROCEDURE — 1159F PR MEDICATION LIST DOCUMENTED IN MEDICAL RECORD: ICD-10-PCS | Mod: CPTII,S$GLB,, | Performed by: ORTHOPAEDIC SURGERY

## 2022-01-14 RX ORDER — HYDROCODONE BITARTRATE AND ACETAMINOPHEN 7.5; 325 MG/1; MG/1
1 TABLET ORAL EVERY 6 HOURS PRN
Qty: 28 TABLET | Refills: 0 | Status: SHIPPED | OUTPATIENT
Start: 2022-01-14 | End: 2022-02-14 | Stop reason: SDUPTHER

## 2022-01-14 NOTE — PROGRESS NOTES
Past Medical History:   Diagnosis Date    COVID-19 02/2020    Diabetes mellitus     type 2    DVT (deep venous thrombosis) 2011    Approx. 2011, after having a bunion removed on left. pt was on warfarin for approx. 6 weeks. unsure of time    Hypertension     Sickle cell anemia trait     pt states all his life    Sleep apnea     USES CPAP    Wears glasses        Past Surgical History:   Procedure Laterality Date    ARTHROSCOPIC REPAIR OF ROTATOR CUFF OF SHOULDER Left 12/16/2021    Procedure: REPAIR, ROTATOR CUFF, ARTHROSCOPIC;  Surgeon: Ezekiel Schaefer MD;  Location: [facility] OR;  Service: Orthopedics;  Laterality: Left;    ARTHROSCOPIC TENOTOMY OF BICEPS TENDON Left 12/16/2021    Procedure: TENOTOMY, BICEPS, ARTHROSCOPIC;  Surgeon: Ezekiel Schaefer MD;  Location: [facility] OR;  Service: Orthopedics;  Laterality: Left;    ARTHROSCOPY OF SHOULDER WITH DECOMPRESSION OF SUBACROMIAL SPACE Left 12/16/2021    Procedure: ARTHROSCOPY, SHOULDER, WITH SUBACROMIAL SPACE DECOMPRESSION;  Surgeon: Ezekiel Schaefer MD;  Location: [facility] OR;  Service: Orthopedics;  Laterality: Left;  GENERAL AND BLOCK    BUNIONECTOMY Left 2006    Pt developed a blood clot in leg, after having bunion removed. Had to do coumadin for approx. 6 weeks.    BUNIONECTOMY Left 2010    complicated by dvt    COLONOSCOPY N/A 11/17/2016    Procedure: COLONOSCOPY;  Surgeon: Keegan Vo MD;  Location: [facility];  Service: Endoscopy;  Laterality: N/A;    COLONOSCOPY N/A 12/23/2020    Procedure: COLONOSCOPY;  Surgeon: Keegan Vo MD;  Location: [facility] ENDO;  Service: Endoscopy;  Laterality: N/A;    EPIDURAL STEROID INJECTION  2019    lumbar    ESOPHAGOGASTRODUODENOSCOPY N/A 12/23/2020    Procedure: EGD (ESOPHAGOGASTRODUODENOSCOPY);  Surgeon: Keegan Vo MD;  Location: [facility] ENDO;  Service: Endoscopy;  Laterality: N/A;    INTRALUMINAL GASTROINTESTINAL TRACT IMAGING VIA CAPSULE N/A 12/30/2020    Procedure: IMAGING PROCEDURE, GI TRACT,  INTRALUMINAL, VIA CAPSULE;  Surgeon: Keegan Vo MD;  Location: [facility];  Service: Endoscopy;  Laterality: N/A;    JOINT REPLACEMENT      right    KNEE SURGERY Right     SHOULDER SURGERY Right     SHOULDER SURGERY Right     VASECTOMY Bilateral 2011       Current Outpatient Medications   Medication Sig    albuterol-ipratropium (DUO-NEB) 2.5 mg-0.5 mg/3 mL nebulizer solution Take 3 mLs by nebulization every 6 (six) hours as needed for Wheezing or Shortness of Breath. Rescue    atorvastatin (LIPITOR) 20 MG tablet Take 1 tablet (20 mg total) by mouth once daily.    blood sugar diagnostic Strp 1 strip by Misc.(Non-Drug; Combo Route) route 2 (two) times daily.    ferrous sulfate (FEOSOL) 325 mg (65 mg iron) Tab tablet Take 1 tablet (325 mg total) by mouth 4 (four) times a week.    ibuprofen (ADVIL,MOTRIN) 800 MG tablet TAKE 1 TABLET(800 MG) BY MOUTH THREE TIMES DAILY    ibuprofen (ADVIL,MOTRIN) 800 MG tablet Take 1 tablet (800 mg total) by mouth 3 (three) times daily.    lancets Misc 1 each by Misc.(Non-Drug; Combo Route) route 2 (two) times daily.    lancing device (BD LANCET DEVICE) Misc 1 each by Misc.(Non-Drug; Combo Route) route 2 (two) times daily.    metFORMIN (GLUCOPHAGE) 1000 MG tablet Take 1 tablet (1,000 mg total) by mouth 2 (two) times daily with meals.    pantoprazole (PROTONIX) 40 MG tablet Take 1 tablet (40 mg total) by mouth once daily.    sucralfate (CARAFATE) 1 gram tablet Take 1 g by mouth 4 (four) times daily.    TURMERIC ORAL Take by mouth.    albuterol (PROVENTIL HFA) 90 mcg/actuation inhaler Inhale 2 puffs into the lungs every 6 (six) hours as needed for Wheezing. Rescue    HYDROcodone-acetaminophen (NORCO) 7.5-325 mg per tablet Take 1 tablet by mouth every 6 (six) hours as needed for Pain.    ketoconazole (NIZORAL) 2 % cream Apply topically 2 (two) times daily.     No current facility-administered medications for this visit.     Facility-Administered Medications Ordered  in Other Visits   Medication    diphenhydrAMINE injection 25 mg    electrolyte-S (ISOLYTE)    fentaNYL 50 mcg/mL injection 25 mcg    HYDROmorphone (PF) injection 0.2 mg    lactated ringers infusion    LIDOcaine (PF) 10 mg/ml (1%) injection 5 mg    ondansetron injection 4 mg    oxyCODONE immediate release tablet 5 mg    sodium chloride 0.9% flush 3 mL    sodium chloride 0.9% flush 3 mL       Review of patient's allergies indicates:   Allergen Reactions    Lisinopril Other (See Comments)     cough    Losartan Other (See Comments)     cough       Family History   Problem Relation Age of Onset    Heart block Mother     Kidney disease Mother     Cancer Paternal Aunt        Social History     Socioeconomic History    Marital status: Single   Tobacco Use    Smoking status: Former Smoker     Packs/day: 0.25     Years: 0.50     Pack years: 0.12     Start date:      Quit date:      Years since quittin.0    Smokeless tobacco: Never Used    Tobacco comment: less than a year, smoked during divorce   Substance and Sexual Activity    Alcohol use: Never     Comment: Not anymore    Drug use: No    Sexual activity: Yes       Chief Complaint:   Chief Complaint   Patient presents with    Post-op Evaluation     #2 L shoulder       Consulting Physician: No ref. provider found    History of present illness: This is a 56 y.o. male following up for left shoulder arthroscopy with rotator cuff repair, biceps tenodesis and subacromial decompression on 2021.      Review of Systems:    Constitution: Denies chills, fever, and sweats.    HENT: Denies headaches or blurry vision.    Cardiovascular: Denies chest pain or irregular heart beat.    Respiratory: Denies cough or shortness of breath.    Gastrointestinal: Denies abdominal pain, nausea, or vomiting.    Musculoskeletal:  Denies muscle cramps.    Neurological: Denies dizziness or focal weakness.    Psychiatric/Behavioral: Normal mental  status.    Hematologic/Lymphatic: Denies bleeding problem or easy bruising/bleeding.    Skin: Denies rash or suspicious lesions.      Examination:    Vital Signs:    Vitals:    01/14/22 0901   Resp: 18       Body mass index is 41.5 kg/m².    This a well-developed, well nourished patient in no acute distress.    Alert and oriented and cooperative to examination.       Physical Exam:  Left shoulder    Inspection/Observation   Swelling:   mild  Erythema:   none  Bruising:   none  Wounds:   Healed  Drainage:  None    Calf   Soft, non-tender    Range of Motion   Limited    Muscle Strength   Limited    Other   Sensation:  normal  Pulse:    palpable    Imaging: Normal post-operative XR     Assessment: Status post left rotator cuff repair        Plan:  He reports that he is doing well.  He puts his pain occasionally at a 6/10.  Will get him started in physical therapy [facility] in the day.  Is also reporting some pain around a right total knee arthroplasty that he had done elsewhere.  We will ask the physical therapist to look at that also.  We did discuss that he could get nerve ablation from Dr. Chand this continue to bother him.  We will refill his [place].  Back in 4 weeks.      DISCLAIMER: This note may have been dictated using voice recognition software and may contain grammatical errors. Report sent to referring provider as required.

## 2022-02-14 ENCOUNTER — PATIENT MESSAGE (OUTPATIENT)
Dept: ORTHOPEDICS | Facility: CLINIC | Age: 57
End: 2022-02-14

## 2022-02-14 ENCOUNTER — OFFICE VISIT (OUTPATIENT)
Dept: ORTHOPEDICS | Facility: CLINIC | Age: 57
End: 2022-02-14
Payer: COMMERCIAL

## 2022-02-14 VITALS — BODY MASS INDEX: 41.59 KG/M2 | HEIGHT: 67 IN | WEIGHT: 265 LBS

## 2022-02-14 DIAGNOSIS — Z98.890 STATUS POST LEFT ROTATOR CUFF REPAIR: Primary | ICD-10-CM

## 2022-02-14 DIAGNOSIS — M12.812 LEFT ROTATOR CUFF TEAR ARTHROPATHY: ICD-10-CM

## 2022-02-14 DIAGNOSIS — M75.102 LEFT ROTATOR CUFF TEAR ARTHROPATHY: ICD-10-CM

## 2022-02-14 PROCEDURE — 1159F PR MEDICATION LIST DOCUMENTED IN MEDICAL RECORD: ICD-10-PCS | Mod: CPTII,S$GLB,, | Performed by: ORTHOPAEDIC SURGERY

## 2022-02-14 PROCEDURE — 3008F PR BODY MASS INDEX (BMI) DOCUMENTED: ICD-10-PCS | Mod: CPTII,S$GLB,, | Performed by: ORTHOPAEDIC SURGERY

## 2022-02-14 PROCEDURE — 99024 PR POST-OP FOLLOW-UP VISIT: ICD-10-PCS | Mod: S$GLB,,, | Performed by: ORTHOPAEDIC SURGERY

## 2022-02-14 PROCEDURE — 99999 PR PBB SHADOW E&M-EST. PATIENT-LVL III: ICD-10-PCS | Mod: PBBFAC,,, | Performed by: ORTHOPAEDIC SURGERY

## 2022-02-14 PROCEDURE — 3008F BODY MASS INDEX DOCD: CPT | Mod: CPTII,S$GLB,, | Performed by: ORTHOPAEDIC SURGERY

## 2022-02-14 PROCEDURE — 1160F RVW MEDS BY RX/DR IN RCRD: CPT | Mod: CPTII,S$GLB,, | Performed by: ORTHOPAEDIC SURGERY

## 2022-02-14 PROCEDURE — 1159F MED LIST DOCD IN RCRD: CPT | Mod: CPTII,S$GLB,, | Performed by: ORTHOPAEDIC SURGERY

## 2022-02-14 PROCEDURE — 1160F PR REVIEW ALL MEDS BY PRESCRIBER/CLIN PHARMACIST DOCUMENTED: ICD-10-PCS | Mod: CPTII,S$GLB,, | Performed by: ORTHOPAEDIC SURGERY

## 2022-02-14 PROCEDURE — 99024 POSTOP FOLLOW-UP VISIT: CPT | Mod: S$GLB,,, | Performed by: ORTHOPAEDIC SURGERY

## 2022-02-14 PROCEDURE — 99999 PR PBB SHADOW E&M-EST. PATIENT-LVL III: CPT | Mod: PBBFAC,,, | Performed by: ORTHOPAEDIC SURGERY

## 2022-02-14 RX ORDER — HYDROCODONE BITARTRATE AND ACETAMINOPHEN 7.5; 325 MG/1; MG/1
1 TABLET ORAL EVERY 6 HOURS PRN
Qty: 28 TABLET | Refills: 0 | Status: SHIPPED | OUTPATIENT
Start: 2022-02-14 | End: 2022-03-29 | Stop reason: SDUPTHER

## 2022-02-14 NOTE — PROGRESS NOTES
Past Medical History:   Diagnosis Date    COVID-19 02/2020    Diabetes mellitus     type 2    DVT (deep venous thrombosis) 2011    Approx. 2011, after having a bunion removed on left. pt was on warfarin for approx. 6 weeks. unsure of time    Hypertension     Sickle cell anemia trait     pt states all his life    Sleep apnea     USES CPAP    Wears glasses        Past Surgical History:   Procedure Laterality Date    ARTHROSCOPIC REPAIR OF ROTATOR CUFF OF SHOULDER Left 12/16/2021    Procedure: REPAIR, ROTATOR CUFF, ARTHROSCOPIC;  Surgeon: Ezekiel Schaefer MD;  Location: [facility] OR;  Service: Orthopedics;  Laterality: Left;    ARTHROSCOPIC TENOTOMY OF BICEPS TENDON Left 12/16/2021    Procedure: TENOTOMY, BICEPS, ARTHROSCOPIC;  Surgeon: Ezekiel Schaefer MD;  Location: [facility] OR;  Service: Orthopedics;  Laterality: Left;    ARTHROSCOPY OF SHOULDER WITH DECOMPRESSION OF SUBACROMIAL SPACE Left 12/16/2021    Procedure: ARTHROSCOPY, SHOULDER, WITH SUBACROMIAL SPACE DECOMPRESSION;  Surgeon: Ezekiel Schaefer MD;  Location: [facility] OR;  Service: Orthopedics;  Laterality: Left;  GENERAL AND BLOCK    BUNIONECTOMY Left 2006    Pt developed a blood clot in leg, after having bunion removed. Had to do coumadin for approx. 6 weeks.    BUNIONECTOMY Left 2010    complicated by dvt    COLONOSCOPY N/A 11/17/2016    Procedure: COLONOSCOPY;  Surgeon: Keegan Vo MD;  Location: [facility];  Service: Endoscopy;  Laterality: N/A;    COLONOSCOPY N/A 12/23/2020    Procedure: COLONOSCOPY;  Surgeon: Keegan Vo MD;  Location: [facility] ENDO;  Service: Endoscopy;  Laterality: N/A;    EPIDURAL STEROID INJECTION  2019    lumbar    ESOPHAGOGASTRODUODENOSCOPY N/A 12/23/2020    Procedure: EGD (ESOPHAGOGASTRODUODENOSCOPY);  Surgeon: Keegan Vo MD;  Location: [facility] ENDO;  Service: Endoscopy;  Laterality: N/A;    INTRALUMINAL GASTROINTESTINAL TRACT IMAGING VIA CAPSULE N/A 12/30/2020    Procedure: IMAGING PROCEDURE, GI TRACT,  INTRALUMINAL, VIA CAPSULE;  Surgeon: Keeagn Vo MD;  Location: [facility];  Service: Endoscopy;  Laterality: N/A;    JOINT REPLACEMENT      right    KNEE SURGERY Right     SHOULDER SURGERY Right     SHOULDER SURGERY Right     VASECTOMY Bilateral 2011       Current Outpatient Medications   Medication Sig    albuterol-ipratropium (DUO-NEB) 2.5 mg-0.5 mg/3 mL nebulizer solution Take 3 mLs by nebulization every 6 (six) hours as needed for Wheezing or Shortness of Breath. Rescue    atorvastatin (LIPITOR) 20 MG tablet Take 1 tablet (20 mg total) by mouth once daily.    blood sugar diagnostic Strp 1 strip by Misc.(Non-Drug; Combo Route) route 2 (two) times daily.    ferrous sulfate (FEOSOL) 325 mg (65 mg iron) Tab tablet Take 1 tablet (325 mg total) by mouth 4 (four) times a week.    HYDROcodone-acetaminophen (NORCO) 7.5-325 mg per tablet Take 1 tablet by mouth every 6 (six) hours as needed for Pain.    ibuprofen (ADVIL,MOTRIN) 800 MG tablet Take 1 tablet (800 mg total) by mouth 3 (three) times daily.    ibuprofen (ADVIL,MOTRIN) 800 MG tablet TAKE 1 TABLET(800 MG) BY MOUTH THREE TIMES DAILY    lancets Misc 1 each by Misc.(Non-Drug; Combo Route) route 2 (two) times daily.    lancing device (BD LANCET DEVICE) Misc 1 each by Misc.(Non-Drug; Combo Route) route 2 (two) times daily.    metFORMIN (GLUCOPHAGE) 1000 MG tablet Take 1 tablet (1,000 mg total) by mouth 2 (two) times daily with meals.    pantoprazole (PROTONIX) 40 MG tablet Take 1 tablet (40 mg total) by mouth once daily.    sucralfate (CARAFATE) 1 gram tablet Take 1 g by mouth 4 (four) times daily.    TURMERIC ORAL Take by mouth.    albuterol (PROVENTIL HFA) 90 mcg/actuation inhaler Inhale 2 puffs into the lungs every 6 (six) hours as needed for Wheezing. Rescue    ketoconazole (NIZORAL) 2 % cream Apply topically 2 (two) times daily.     No current facility-administered medications for this visit.     Facility-Administered Medications Ordered  in Other Visits   Medication    diphenhydrAMINE injection 25 mg    electrolyte-S (ISOLYTE)    fentaNYL 50 mcg/mL injection 25 mcg    HYDROmorphone (PF) injection 0.2 mg    lactated ringers infusion    LIDOcaine (PF) 10 mg/ml (1%) injection 5 mg    ondansetron injection 4 mg    oxyCODONE immediate release tablet 5 mg    sodium chloride 0.9% flush 3 mL    sodium chloride 0.9% flush 3 mL       Review of patient's allergies indicates:   Allergen Reactions    Lisinopril Other (See Comments)     cough    Losartan Other (See Comments)     cough       Family History   Problem Relation Age of Onset    Heart block Mother     Kidney disease Mother     Cancer Paternal Aunt        Social History     Socioeconomic History    Marital status: Single   Tobacco Use    Smoking status: Former Smoker     Packs/day: 0.25     Years: 0.50     Pack years: 0.12     Start date:      Quit date:      Years since quittin.1    Smokeless tobacco: Never Used    Tobacco comment: less than a year, smoked during divorce   Substance and Sexual Activity    Alcohol use: Never     Comment: Not anymore    Drug use: No    Sexual activity: Yes       Chief Complaint:   Chief Complaint   Patient presents with    Left Shoulder - Post-op Evaluation     DOS: 2021 - S/P Left Shoulder RCR. Patient has not started therapy.        Consulting Physician: No ref. provider found    History of present illness: This is a 56 y.o. male following up for left shoulder arthroscopy with rotator cuff repair, biceps tenodesis and subacromial decompression on 2021.      Review of Systems:    Constitution: Denies chills, fever, and sweats.    HENT: Denies headaches or blurry vision.    Cardiovascular: Denies chest pain or irregular heart beat.    Respiratory: Denies cough or shortness of breath.    Gastrointestinal: Denies abdominal pain, nausea, or vomiting.    Musculoskeletal:  Denies muscle cramps.    Neurological: Denies dizziness  or focal weakness.    Psychiatric/Behavioral: Normal mental status.    Hematologic/Lymphatic: Denies bleeding problem or easy bruising/bleeding.    Skin: Denies rash or suspicious lesions.      Examination:    Vital Signs:    There were no vitals filed for this visit.    Body mass index is 41.5 kg/m².    This a well-developed, well nourished patient in no acute distress.    Alert and oriented and cooperative to examination.       Physical Exam:  Left shoulder    Inspection/Observation   Swelling:   mild  Erythema:   none  Bruising:   none  Wounds:   Healed  Drainage:  None    Calf   Soft, non-tender    Range of Motion   Limited    Muscle Strength   Limited    Other   Sensation:  normal  Pulse:    palpable    Imaging:      Assessment: Status post left rotator cuff repair        Plan:  He reports that he is doing well.  He puts his pain occasionally at a 5/10.  We have given him a prescription for therapy but there was some confusion about that on his part and he did not start therapy.  We have encouraged him to call them today and try to get in as soon as possible.  We gave him some exercises to start at home.  We refilled his [place] today.  We will see him back in 6 weeks.  He is out of his immobilizer.      DISCLAIMER: This note may have been dictated using voice recognition software and may contain grammatical errors. Report sent to referring provider as required.

## 2022-02-16 ENCOUNTER — CLINICAL SUPPORT (OUTPATIENT)
Dept: REHABILITATION | Facility: HOSPITAL | Age: 57
End: 2022-02-16
Attending: ORTHOPAEDIC SURGERY
Payer: COMMERCIAL

## 2022-02-16 DIAGNOSIS — M25.612 DECREASED RANGE OF MOTION OF LEFT SHOULDER: ICD-10-CM

## 2022-02-16 DIAGNOSIS — M12.812 LEFT ROTATOR CUFF TEAR ARTHROPATHY: ICD-10-CM

## 2022-02-16 DIAGNOSIS — M25.512 LEFT SHOULDER PAIN, UNSPECIFIED CHRONICITY: ICD-10-CM

## 2022-02-16 DIAGNOSIS — M75.102 LEFT ROTATOR CUFF TEAR ARTHROPATHY: ICD-10-CM

## 2022-02-16 PROCEDURE — 97140 MANUAL THERAPY 1/> REGIONS: CPT | Mod: PN

## 2022-02-16 PROCEDURE — 97161 PT EVAL LOW COMPLEX 20 MIN: CPT | Mod: PN

## 2022-02-16 PROCEDURE — 97110 THERAPEUTIC EXERCISES: CPT | Mod: PN

## 2022-02-16 NOTE — PLAN OF CARE
Physical Therapy Initial Evaluation     Name: José Miguel Aguirre [facility] Number: [id number]    Therapy Diagnosis:   Encounter Diagnoses   Name Primary?    Left rotator cuff tear arthropathy     Left shoulder pain, unspecified chronicity     Decreased range of motion of left shoulder      Physician: Ezekiel Schaefer MD    Physician Orders: PT Eval and Treat   Medical Diagnosis from Referral: Left rotator cuff tear arthropathy   Evaluation Date: 2/16/2022  Authorization Period Expiration: 02/15/2023  Plan of Care Expiration: 04/13/2022  Progress Note Due: By visit #10  Visit # / Visits authorized: 1/12   PTA Visit #: 0    Precautions: Standard and Diabetes     Time In: 2:15 pm  Time Out: 3:07 pm  Total Appointment Time (timed & untimed codes): 52 minutes    Past Medical History:   Diagnosis Date    COVID-19 02/2020    Diabetes mellitus     type 2    DVT (deep venous thrombosis) 2011    Approx. 2011, after having a bunion removed on left. pt was on warfarin for approx. 6 weeks. unsure of time    Hypertension     Sickle cell anemia trait     pt states all his life    Sleep apnea     USES CPAP    Wears glasses      Surgeries: R TKA, R rotator cuff repair, left bunion removed    SUBJECTIVE     Patient states: he underwent rotator cuff repair, biceps tenotomy, and SAD on left shoulder on December 16th. Pt lives alone but has his family perform shopping for him currently. Pt states he has difficulty with washing and putting up dishes and will occasionally require assistance for dressing and/or bathing. Pt is not doing his laundry. Pt is currently unable to perform elliptical bike and unable to golf. Pt states he discontinued wearing sling on Monday. Pt is right handed.   Pts goals: Pt would like to get back to golfing and get back to his normal activities including cutting his grass.   Pain Scale: José Miguel rates pain on a scale of 0-10 to be 8 at worst; 5 currently;  5 at best .  Radicular symptoms: negative  Aggravating factors: worse with activity   Easing factors: ice, rest  Occupation: Retired            OBJECTIVE     Dermatomes: intact  Palpation: TTP to lesser tuberosity of humerus, biceps, and triceps     Shoulder Range of Motion:   ACTIVE ROM LEFT RIGHT   Flexion Deferred 110 degrees   Abduction Deferred 85 degrees   IR Deferred L2   ER Deferred C6     PASSIVE ROM LEFT   Flexion 84 degrees   Abduction 80 degrees   IR 30 degrees   ER 22 degrees         STRENGTH LEFT RIGHT   Flexion NT 4/5   Abduction NT 4+/5   IR NT 4/5   ER NT 3+/5   Scapular protraction NT 4-/5         Pt/family was provided educational information, including: role of PT, goals for PT, scheduling - pt verbalized understanding.     Exercises were reviewed and pt was able to demonstrate them prior to the end of the session. Pt received a written copy of exercises to perform at home.  Pt has no apparent cultural, educational or language barriers to learning.    TREATMENT   Pt tolerated therapeutic exercises and manual therapy fairly well as instructed by physical therapist. Pt demonstrated muscle guarding with pain during PROM. Pt performed low reps of shoulder flexion table slides due to tissue irritability. Pt required verbal and demonstration cues for execution of all exercises. Pt declined ice following treatment. No adverse effects occurred.     PT Evaluation Completed? Yes  Discussed Plan of Care with patient: Yes    José Miguel received 8 minutes of manual therapy to improve left shoulder range of motion including:  PROM into all directions    José Miguel received 10 minutes of therapeutic exercises to increase left shoulder range of motion including:   Scapular four way clocks x 10 ea  Table shoulder flexion slides 2 x 6 with 3'' holds  Shoulder pendulums forwards/backwards and CW/CCW x 60'' ea  Cane abduction/IR/ER DNP  Prone rows DNP  Scapular supine punches DNP  Pulley DNP  UBE no resistance DNP      Written  Home Exercises Provided via [employer] handout (scanned into pt's chart):   José Miguel demo good understanding of the education provided. Patient demo good return demo of skill of exercises.    ASSESSMENT   José Miguel is a 56 y.o. male referred to outpatient Physical Therapy due to medical diagnosis of left rotator cuff arthropathy s/p rotator cuff repair. Pt's physical therapy impairments consist of left shoulder pain, decreased left shoulder range of motion, and muscle weakness which are limiting his ability to dress, bathe, perform household chores, golf, and mow his lawn. Pt requires skilled, physical therapy interventions to address current impairments, activity limitations, and participation restrictions. Evaluation is considered low complexity based on pt's personal factors and comorbidities, body system elements, and stable and uncomplicated clinical presentation.    Pt prognosis is Good. Pt may benefit from skilled, outpatient physical therapy to address the above stated deficits, provide pt/family education and to maximize pt's level of independence.     Pt's spiritual, cultural and educational needs considered and pt agreeable to plan of care and goals as stated below:     Anticipated Barriers for physical therapy: diabetes    Short Term GOALS: 1-3 weeks. Pt agrees with goals set.   1. Pt will be complaint and independent with home exercise program.   2. Pt will increase PROM of left shoulder into flexion and abduction to >/= 100 degrees.    3. Pt will increase left shoulder strength via MMT to >/= 3+/5.     Long Term GOALS: 4-8 weeks. Pt agrees with goals set.  1. Pt will return to cutting his grass independently.   2. Pt will return to playing 18 holes of golf independently.   3. Pt will utilize left shoulder to put up his dishes independently without limitation.   4. Pt will increase left shoulder flexion and abduction AROM to >/= 125 degrees for improved ability to perform overhead tasks.   5. Pt will lift 15# object  from eye level to above head utilizing both UE's for improved lifting capacity.       PLAN   Plan of care: 2/16/2022 to 04/13/2022    Outpatient physical therapy 2 times weekly may include: pt ed, hep, therapeutic exercises and activities, neuromuscular re-education/ balance exercises, joint mobilizations (grades I/II), and modalities prn. Cont PT for 6 weeks. Pt may be seen by PTA as part of the rehabilitation team.       Therapist: Ronald Morgan, PT      I certify the need for these services furnished under this plan of treatment and while under my care.  ____________________________________ Physician/Referring Practitioner   Date of Signature

## 2022-02-18 ENCOUNTER — CLINICAL SUPPORT (OUTPATIENT)
Dept: REHABILITATION | Facility: HOSPITAL | Age: 57
End: 2022-02-18
Attending: ORTHOPAEDIC SURGERY
Payer: COMMERCIAL

## 2022-02-18 DIAGNOSIS — M25.512 LEFT SHOULDER PAIN, UNSPECIFIED CHRONICITY: ICD-10-CM

## 2022-02-18 DIAGNOSIS — M25.612 DECREASED RANGE OF MOTION OF LEFT SHOULDER: Primary | ICD-10-CM

## 2022-02-18 PROCEDURE — 97110 THERAPEUTIC EXERCISES: CPT | Mod: PN,CQ

## 2022-02-18 PROCEDURE — 97140 MANUAL THERAPY 1/> REGIONS: CPT | Mod: PN,CQ

## 2022-02-18 NOTE — PROGRESS NOTES
Physical Therapy Daily Note     Name: José Miguel Aguirre [facility] Number: [id number]  Diagnosis:   Encounter Diagnoses   Name Primary?    Left shoulder pain, unspecified chronicity     Decreased range of motion of left shoulder Yes     Physician: Ezekiel Schaefer MD  Precautions: Standard and Diabetes  Visit #: 2 of 12  PTA Visit #: 1  Time In: 12:30 PM  Time Out: 1:35  PM    Subjective     Pt reports: No new c/o's.  Pain Scale: José Miguel rates pain on a scale of 0-10 to be 5 currently.    Objective     José Miguel received individual therapeutic exercises to develop strength, endurance and ROM for 30 minutes including:  Pulleys flex/scaption x 3 min each  Standing UE [place] x 3 min (on floor)  Scapular four way clocks x 10 ea  Table shoulder flexion slides 2 x 6 with 3'' holds  Shoulder pendulums forwards/backwards and CW/CCW x 1 min ea  Supine Cane Shld IR/ER x 10  Supine Cane Serratus Punches x 10  Supine Cane Shld Flexion x 10  Prone rows DNP  UBE no resistance DNP    José Miguel received the following manual therapy techniques:  PROM x 15 min to left shoulder/scapula        The patient received the following direct contact modalities after being cleared for contraindications:     The patient received the following supervised modalities after being cleared for contradictions:     Written Home Exercises Provided:   Pt demo good understanding of the education provided. José Miguel demonstrated good return demonstration of activities.     Education provided re:  José Miguel verbalized good understanding of education provided.   No spiritual or educational barriers to learning provided    Assessment     Patient did ok with limited exercises; limited strength and ROM noted in left shoulder; progress as tolerated per protocol to improve strength and and mobility.   This is a 56 y.o. male referred to outpatient physical therapy and presents with a medical diagnosis of left rotator cuff arthropathy s/p  rotator cuff repair. Pt's physical therapy impairments consist of left shoulder pain, decreased left shoulder range of motion, and muscle weakness which are limiting his ability to dress, bathe, perform household chores, golf, and mow his lawn and demonstrates limitations as described in the problem list. Pt prognosis is Good. Pt will continue to benefit from skilled outpatient physical therapy to address the deficits listed in the problem list, provide pt/family education and to maximize pt's level of independence in the home and community environment.     GOALS:  Short Term GOALS: 1-3 weeks. Pt agrees with goals set.   1. Pt will be complaint and independent with home exercise program.   2. Pt will increase PROM of left shoulder into flexion and abduction to >/= 100 degrees.    3. Pt will increase left shoulder strength via MMT to >/= 3+/5.      Long Term GOALS: 4-8 weeks. Pt agrees with goals set.  1. Pt will return to cutting his grass independently.   2. Pt will return to playing 18 holes of golf independently.   3. Pt will utilize left shoulder to put up his dishes independently without limitation.   4. Pt will increase left shoulder flexion and abduction AROM to >/= 125 degrees for improved ability to perform overhead tasks.   5. Pt will lift 15# object from eye level to above head utilizing both UE's for improved lifting capacity.        Plan     Continue with established Plan of Care towards PT goals.    Therapist: Jonathan Favre, PTA  2/18/2022

## 2022-02-22 ENCOUNTER — CLINICAL SUPPORT (OUTPATIENT)
Dept: REHABILITATION | Facility: HOSPITAL | Age: 57
End: 2022-02-22
Attending: ORTHOPAEDIC SURGERY
Payer: COMMERCIAL

## 2022-02-22 DIAGNOSIS — M25.612 DECREASED RANGE OF MOTION OF LEFT SHOULDER: Primary | ICD-10-CM

## 2022-02-22 DIAGNOSIS — M25.512 ACUTE PAIN OF LEFT SHOULDER: ICD-10-CM

## 2022-02-22 PROCEDURE — 97140 MANUAL THERAPY 1/> REGIONS: CPT | Mod: PN,CQ

## 2022-02-22 PROCEDURE — 97110 THERAPEUTIC EXERCISES: CPT | Mod: PN,CQ

## 2022-02-22 NOTE — PROGRESS NOTES
Physical Therapy Daily Note     Name: José Miguel Aguirre [facility] Number: [id number]  Diagnosis:   Encounter Diagnoses   Name Primary?    Acute pain of left shoulder     Decreased range of motion of left shoulder Yes     Physician: Ezekiel Schaefer MD  Precautions: Standard and Diabetes  Visit #: 3 of 12  PTA Visit #: 2  Time In: 9:30 AM  Time Out: 10:40  AM    Subjective     Pt reports: No new c/o's.  Pain Scale: José Miguel rates pain on a scale of 0-10 to be 5 currently.    Objective     José Miguel received individual therapeutic exercises to develop strength, endurance and ROM for 30 minutes including:  Pulleys flex/scaption x 3 min each  Standing UE [place] x 3 min (on floor)  Scapular four way clocks x 15 ea  Table shoulder flexion slides 2 x 6 with 3'' holds  Shoulder pendulums forwards/backwards and CW/CCW x 1 min ea  Supine Cane Shld IR/ER x 10  Supine Cane Serratus Punches x 10  Supine Cane Shld Flexion x 10  Standing Cane shld ext x 10  Prone rows DNP  UBE no resistance DNP    José Miguel received the following manual therapy techniques:  PROM x 15 min to left shoulder/scapula        The patient received the following direct contact modalities after being cleared for contraindications:     The patient received the following supervised modalities after being cleared for contradictions:     Written Home Exercises Provided:   Pt demo good understanding of the education provided. José Miguel demonstrated good return demonstration of activities.     Education provided re:  José Miguel verbalized good understanding of education provided.   No spiritual or educational barriers to learning provided    Assessment     Patient did ok with limited exercises; limited strength and ROM noted in left shoulder; progress as tolerated per protocol to improve strength and and mobility.   This is a 56 y.o. male referred to outpatient physical therapy and presents with a medical diagnosis of left rotator cuff  arthropathy s/p rotator cuff repair. Pt's physical therapy impairments consist of left shoulder pain, decreased left shoulder range of motion, and muscle weakness which are limiting his ability to dress, bathe, perform household chores, golf, and mow his lawn and demonstrates limitations as described in the problem list. Pt prognosis is Good. Pt will continue to benefit from skilled outpatient physical therapy to address the deficits listed in the problem list, provide pt/family education and to maximize pt's level of independence in the home and community environment.     GOALS:  Short Term GOALS: 1-3 weeks. Pt agrees with goals set.   1. Pt will be complaint and independent with home exercise program.   2. Pt will increase PROM of left shoulder into flexion and abduction to >/= 100 degrees.    3. Pt will increase left shoulder strength via MMT to >/= 3+/5.      Long Term GOALS: 4-8 weeks. Pt agrees with goals set.  1. Pt will return to cutting his grass independently.   2. Pt will return to playing 18 holes of golf independently.   3. Pt will utilize left shoulder to put up his dishes independently without limitation.   4. Pt will increase left shoulder flexion and abduction AROM to >/= 125 degrees for improved ability to perform overhead tasks.   5. Pt will lift 15# object from eye level to above head utilizing both UE's for improved lifting capacity.        Plan     Continue with established Plan of Care towards PT goals.    Therapist: Jonathan Favre, PTA  2/22/2022

## 2022-02-24 ENCOUNTER — CLINICAL SUPPORT (OUTPATIENT)
Dept: REHABILITATION | Facility: HOSPITAL | Age: 57
End: 2022-02-24
Attending: ORTHOPAEDIC SURGERY
Payer: COMMERCIAL

## 2022-02-24 DIAGNOSIS — M25.512 LEFT SHOULDER PAIN, UNSPECIFIED CHRONICITY: ICD-10-CM

## 2022-02-24 DIAGNOSIS — M25.612 DECREASED RANGE OF MOTION OF LEFT SHOULDER: ICD-10-CM

## 2022-02-24 PROCEDURE — 97110 THERAPEUTIC EXERCISES: CPT | Mod: PN

## 2022-02-24 NOTE — PROGRESS NOTES
Physical Therapy Daily Note     Name: José Miguel Aguirre [facility] Number: [id number]  Diagnosis:   Encounter Diagnoses   Name Primary?    Left shoulder pain, unspecified chronicity     Decreased range of motion of left shoulder      Physician: Ezekiel Schaefer MD  Precautions: Standard and Diabetes  Visit #: 4 of 12  PTA Visit #: 0  Time In: 9:46 AM  Time Out: 10:29 AM  Total treatment time: 43 minutes     Subjective     Pt reports: No new c/o's. Pt compliant with initial HEP.   Pain Scale: José Miguel rates pain on a scale of 0-10 to be 5 currently.    Objective     José Miguel received individual therapeutic exercises to develop strength, endurance, and ROM for 40 minutes including:  Pulleys flex/scaption x 3 min each  Standing UE [place] x 3 min (on floor)  Scapular four way clocks x 20 ea  Table shoulder flexion slides 2 x 6 with 3'' holds  Shoulder pendulums forwards/backwards and CW/CCW x 1 min ea  Standing dowel Shld IR/ER x 10  Supine Serratus Punches 1 x 10 with assist from therapist  Supine Cane Shld Flexion x 10   Supine shoulder flexion AAROM with assist from therapist 2 x 10  Standing Cane shld ext x 15  Isometric shoulder flexion/abd/IR/ER wall 5 sec holds x 10 ea  Prone rows DNP  UBE no resistance DNP  Elbow flexion with red TB 2 x 15 (neutral forearm)    DNP  José Miguel received the following manual therapy techniques:  PROM x 15 min to left shoulder/scapula        The patient received the following direct contact modalities after being cleared for contraindications:     The patient received the following supervised modalities after being cleared for contradictions:     Written Home Exercises Provided: added isometrics at wall  Pt demo good understanding of the education provided. José Miguel demonstrated good return demonstration of activities.     Education provided re:  José Miguel verbalized good understanding of education provided.   No spiritual or educational barriers to  learning provided    Assessment     Pt tolerated therapy poorly including therex without incident as instructed by physical therapist. Pt c/o finger numbness during wall shoulder isometric ER that resided shortly thereafter. Pt unable to generate enough strength to perform wall flexion with towel or finger walks. Pt compensating significantly with his lower body when attempting shoulder flexion AAROM in supine which resulted in back pain. Pt also demonstrated wrist compensations when performing side lying shoulder ER. Pt unable to tolerate attempt of second set of scapular punches.     This is a 56 y.o. male referred to outpatient physical therapy and presents with a medical diagnosis of left rotator cuff arthropathy s/p rotator cuff repair. Pt's physical therapy impairments consist of left shoulder pain, decreased left shoulder range of motion, and muscle weakness which are limiting his ability to dress, bathe, perform household chores, golf, and mow his lawn and demonstrates limitations as described in the problem list. Pt prognosis is Good. Pt will continue to benefit from skilled outpatient physical therapy to address the deficits listed in the problem list, provide pt/family education and to maximize pt's level of independence in the home and community environment.     GOALS:  Short Term GOALS: 1-3 weeks. Pt agrees with goals set.   1. Pt will be complaint and independent with home exercise program.   2. Pt will increase PROM of left shoulder into flexion and abduction to >/= 100 degrees.    3. Pt will increase left shoulder strength via MMT to >/= 3+/5.      Long Term GOALS: 4-8 weeks. Pt agrees with goals set.  1. Pt will return to cutting his grass independently.   2. Pt will return to playing 18 holes of golf independently.   3. Pt will utilize left shoulder to put up his dishes independently without limitation.   4. Pt will increase left shoulder flexion and abduction AROM to >/= 125 degrees for improved  ability to perform overhead tasks.   5. Pt will lift 15# object from eye level to above head utilizing both UE's for improved lifting capacity.        Plan     Continue with established Plan of Care towards PT goals.    Therapist: Ronald Morgan, PT  2/24/2022

## 2022-02-28 ENCOUNTER — CLINICAL SUPPORT (OUTPATIENT)
Dept: REHABILITATION | Facility: HOSPITAL | Age: 57
End: 2022-02-28
Attending: ORTHOPAEDIC SURGERY
Payer: COMMERCIAL

## 2022-02-28 DIAGNOSIS — M25.512 LEFT SHOULDER PAIN, UNSPECIFIED CHRONICITY: ICD-10-CM

## 2022-02-28 DIAGNOSIS — M25.612 DECREASED RANGE OF MOTION OF LEFT SHOULDER: ICD-10-CM

## 2022-02-28 PROCEDURE — 97140 MANUAL THERAPY 1/> REGIONS: CPT | Mod: PN

## 2022-02-28 PROCEDURE — 97110 THERAPEUTIC EXERCISES: CPT | Mod: PN

## 2022-02-28 NOTE — PROGRESS NOTES
Physical Therapy Daily Note     Name: José Miguel Aguirre [facility] Number: [id number]  Diagnosis:   Encounter Diagnoses   Name Primary?    Decreased range of motion of left shoulder     Left shoulder pain, unspecified chronicity      Physician: Ezekiel Schaefer MD  Precautions: Standard and Diabetes  Authorization Period Expiration: 02/15/2023  Plan of Care Expiration: 04/13/2022  Progress Note Due: By visit #10  FOTO: updated on 2/28/2022  Visit #: 5 of 12  PTA Visit #: 0  Time In: 9:48 AM  Time Out: 10:30 AM  Total treatment time: 42 minutes     Subjective     Pt reports: No new c/o's. Pt compliant with initial HEP.   Pain Scale: José Miguel rates pain on a scale of 0-10 to be 4 currently.    Objective   PROM flexion- 132 degrees    José Miguel received individual therapeutic exercises to develop strength, endurance, and ROM for 30 minutes including:  Pulleys flex/scaption x 3 min each  Standing UE [place] x 3 min (on floor)  Scapular four way clocks x 20 ea  Table shoulder flexion slides 2 x 6 with 3'' holds  Shoulder pendulums forwards/backwards and CW/CCW x 1 min ea  Standing dowel Shld IR/ER x 20  Supine Serratus Punches 2 x 10 with assist from therapist  Supine Cane Shld Flexion x 10   Supine shoulder flexion AAROM with assist from therapist 2 x 10  Standing dowel shld ext x 15  Isometric shoulder flexion/abd/IR/ER wall 5 sec holds x 10 ea  Standing rows with 2# x 10  UBE no resistance DNP  Elbow flexion with red TB 2 x 15 (neutral forearm)  Finger walks on wall into shoulder flexion x 6      José Miguel received the following manual therapy techniques:  PROM x 10 min to left shoulder into all motions especially flexion/abd/ER and posterior joint glides grade II/III       The patient received the following direct contact modalities after being cleared for contraindications:     The patient received the following supervised modalities after being cleared for contradictions:      Written Home Exercises Provided: none new  Pt demo good understanding of the education provided. José Miguel demonstrated good return demonstration of activities.     Education provided re:  José Miguel verbalized good understanding of education provided.   No spiritual or educational barriers to learning provided    Assessment     Pt tolerated therapy fair including therex and manual therapy without incident as instructed by physical therapist. Pt required intermittent rest off pulley 2/2 increased pain and required verbal cues to keep shoulder in open can position. Pt also required verbal cues to decrease unwanted compensations. Pt with greater difficulty with elbow flexion compared to last session. Pt most limited with abd and ER during PROM.     This is a 56 y.o. male referred to outpatient physical therapy and presents with a medical diagnosis of left rotator cuff arthropathy s/p rotator cuff repair. Pt's physical therapy impairments consist of left shoulder pain, decreased left shoulder range of motion, and muscle weakness which are limiting his ability to dress, bathe, perform household chores, golf, and mow his lawn and demonstrates limitations as described in the problem list. Pt prognosis is Good. Pt will continue to benefit from skilled outpatient physical therapy to address the deficits listed in the problem list, provide pt/family education and to maximize pt's level of independence in the home and community environment.     GOALS:  Short Term GOALS: 1-3 weeks. Pt agrees with goals set.   1. Pt will be complaint and independent with home exercise program.   2. Pt will increase PROM of left shoulder into flexion and abduction to >/= 100 degrees.    3. Pt will increase left shoulder strength via MMT to >/= 3+/5.      Long Term GOALS: 4-8 weeks. Pt agrees with goals set.  1. Pt will return to cutting his grass independently.   2. Pt will return to playing 18 holes of golf independently.   3. Pt will utilize left  shoulder to put up his dishes independently without limitation.   4. Pt will increase left shoulder flexion and abduction AROM to >/= 125 degrees for improved ability to perform overhead tasks.   5. Pt will lift 15# object from eye level to above head utilizing both UE's for improved lifting capacity.        Plan     Continue with established Plan of Care towards PT goals.    Therapist: Ronald Morgan, PT  2/28/2022

## 2022-03-03 ENCOUNTER — CLINICAL SUPPORT (OUTPATIENT)
Dept: REHABILITATION | Facility: HOSPITAL | Age: 57
End: 2022-03-03
Attending: ORTHOPAEDIC SURGERY
Payer: COMMERCIAL

## 2022-03-03 DIAGNOSIS — M25.612 DECREASED RANGE OF MOTION OF LEFT SHOULDER: Primary | ICD-10-CM

## 2022-03-03 DIAGNOSIS — M25.512 ACUTE PAIN OF LEFT SHOULDER: ICD-10-CM

## 2022-03-03 PROCEDURE — 97110 THERAPEUTIC EXERCISES: CPT | Mod: PN,CQ

## 2022-03-03 PROCEDURE — 97140 MANUAL THERAPY 1/> REGIONS: CPT | Mod: PN,CQ

## 2022-03-03 NOTE — PROGRESS NOTES
Physical Therapy Daily Note     Name: José Miguel Aguirre [facility] Number: [id number]  Diagnosis:   Encounter Diagnoses   Name Primary?    Acute pain of left shoulder     Decreased range of motion of left shoulder Yes     Physician: Ezekiel Schaefer MD  Precautions: Standard and Diabetes  Authorization Period Expiration: 02/15/2023  Plan of Care Expiration: 04/13/2022  Progress Note Due: By visit #10  FOTO: updated on 2/28/2022  Visit #: 6 of 12  PTA Visit #: 1  Time In: 9:30 AM  Time Out: 10:35 AM  Total treatment time: 42 minutes     Subjective     Pt reports: No new c/o's. Pt compliant with initial HEP.   Pain Scale: José Miguel rates pain on a scale of 0-10 to be 4-5 currently.    Objective   PROM flexion- 132 degrees    José Miguel received individual therapeutic exercises to develop strength, endurance, and ROM for 30 minutes including:  Pulleys flex/scaption x 3 min each  Standing UE [place] x 3 min (on floor)  Scapular four way clocks x 20 ea  Table shoulder flexion slides 2 x 6 with 3'' holds  Shoulder pendulums forwards/backwards and CW/CCW x 1 min ea  Standing dowel Shld IR/ER x 20  Supine Serratus Punches 2 x 10 with assist from therapist  Supine Cane Shld Flexion x 10   Supine shoulder flexion AAROM with assist from therapist 2 x 10  Standing dowel shld ext x 15  Isometric shoulder flexion/abd/IR/ER wall 5 sec holds x 10 ea  Standing rows with 2# x 10  UBE no resistance DNP  Elbow flexion with red TB 2 x 15 (neutral forearm)  Finger walks on wall into shoulder flexion x 6      José Miguel received the following manual therapy techniques:  PROM x 10 min to left shoulder into all motions especially flexion/abd/ER and posterior joint glides grade II/III       The patient received the following direct contact modalities after being cleared for contraindications:     The patient received the following supervised modalities after being cleared for contradictions:     Written Home  Exercises Provided: none new  Pt demo good understanding of the education provided. José Miguel demonstrated good return demonstration of activities.     Education provided re:  José Miguel verbalized good understanding of education provided.   No spiritual or educational barriers to learning provided    Assessment     Pt tolerated therapy fair including therex and manual therapy without incident as instructed by therapist. Pt also required verbal cues to decrease unwanted compensations.  Pt most limited with abd and ER during PROM.     This is a 56 y.o. male referred to outpatient physical therapy and presents with a medical diagnosis of left rotator cuff arthropathy s/p rotator cuff repair. Pt's physical therapy impairments consist of left shoulder pain, decreased left shoulder range of motion, and muscle weakness which are limiting his ability to dress, bathe, perform household chores, golf, and mow his lawn and demonstrates limitations as described in the problem list. Pt prognosis is Good. Pt will continue to benefit from skilled outpatient physical therapy to address the deficits listed in the problem list, provide pt/family education and to maximize pt's level of independence in the home and community environment.     GOALS:  Short Term GOALS: 1-3 weeks. Pt agrees with goals set.   1. Pt will be complaint and independent with home exercise program.   2. Pt will increase PROM of left shoulder into flexion and abduction to >/= 100 degrees.    3. Pt will increase left shoulder strength via MMT to >/= 3+/5.      Long Term GOALS: 4-8 weeks. Pt agrees with goals set.  1. Pt will return to cutting his grass independently.   2. Pt will return to playing 18 holes of golf independently.   3. Pt will utilize left shoulder to put up his dishes independently without limitation.   4. Pt will increase left shoulder flexion and abduction AROM to >/= 125 degrees for improved ability to perform overhead tasks.   5. Pt will lift 15# object  from eye level to above head utilizing both UE's for improved lifting capacity.        Plan     Progress with AROM and add resistance as tolerated.     Therapist: Jonathan Favre, PTA  3/3/2022

## 2022-03-07 ENCOUNTER — CLINICAL SUPPORT (OUTPATIENT)
Dept: REHABILITATION | Facility: HOSPITAL | Age: 57
End: 2022-03-07
Attending: ORTHOPAEDIC SURGERY
Payer: COMMERCIAL

## 2022-03-07 DIAGNOSIS — M25.512 LEFT SHOULDER PAIN, UNSPECIFIED CHRONICITY: Primary | ICD-10-CM

## 2022-03-07 DIAGNOSIS — M25.612 DECREASED RANGE OF MOTION OF LEFT SHOULDER: ICD-10-CM

## 2022-03-07 PROCEDURE — 97110 THERAPEUTIC EXERCISES: CPT | Mod: PN,CQ

## 2022-03-07 NOTE — PROGRESS NOTES
Physical Therapy Daily Note     Name: José Miguel Aguirre [facility] Number: [id number]  Diagnosis:   Encounter Diagnoses   Name Primary?    Left shoulder pain, unspecified chronicity Yes    Decreased range of motion of left shoulder      Physician: Ezekiel Schaefer MD  Precautions: Standard and Diabetes  Authorization Period Expiration: 02/15/2023  Plan of Care Expiration: 04/13/2022  Progress Note Due: By visit #10  FOTO: updated on 2/28/2022  Visit #: 7 of 12  PTA Visit #: 1  Time In: 9:00 AM  Time Out: 9:45 AM  Total treatment time: 45 minutes     Subjective     Pt reports: No new c/o's. Pt compliant with initial HEP.   Pain Scale: José Miguel rates pain on a scale of 0-10 to be 4 currently.    Objective   PROM flexion- 132 degrees    José Miguel received individual therapeutic exercises to develop strength, endurance, and ROM for 30 minutes including:  Pulleys flex/scaption x 3 min each  Standing UE [place] x 3 min (on floor)  Scapular four way clocks x 20 ea  Table shoulder flexion slides 2 x 6 with 3'' holds  Shoulder pendulums forwards/backwards and CW/CCW x 1 min ea  Standing dowel Shld IR/ER x 20  Supine Serratus Punches 2 x 10 with assist from therapist  Supine Cane Shld Flexion x 10   Supine shoulder flexion AAROM with assist from therapist 2 x 10  Standing dowel shld ext x 15  Isometric shoulder flexion/abd/IR/ER wall 5 sec holds x 10 ea  Standing rows with 2# x 10  UBE no resistance DNP  Elbow flexion with red TB 2 x 15 (neutral forearm)  Finger walks on wall into shoulder flexion x 6      José Miguel received the following manual therapy techniques:  PROM x 10 min to left shoulder into all motions especially flexion/abd/ER and posterior joint glides grade II/III       The patient received the following direct contact modalities after being cleared for contraindications:     The patient received the following supervised modalities after being cleared for contradictions:      Written Home Exercises Provided: none new  Pt demo good understanding of the education provided. José Miguel demonstrated good return demonstration of activities.     Education provided re:  José Miguel verbalized good understanding of education provided.   No spiritual or educational barriers to learning provided    Assessment     Pt tolerated therapy fair including therex and manual therapy without incident as instructed by therapist. Pt most limited with abd and ER during PROM.     This is a 56 y.o. male referred to outpatient physical therapy and presents with a medical diagnosis of left rotator cuff arthropathy s/p rotator cuff repair. Pt's physical therapy impairments consist of left shoulder pain, decreased left shoulder range of motion, and muscle weakness which are limiting his ability to dress, bathe, perform household chores, golf, and mow his lawn and demonstrates limitations as described in the problem list. Pt prognosis is Good. Pt will continue to benefit from skilled outpatient physical therapy to address the deficits listed in the problem list, provide pt/family education and to maximize pt's level of independence in the home and community environment.     GOALS:  Short Term GOALS: 1-3 weeks. Pt agrees with goals set.   1. Pt will be complaint and independent with home exercise program.   2. Pt will increase PROM of left shoulder into flexion and abduction to >/= 100 degrees.    3. Pt will increase left shoulder strength via MMT to >/= 3+/5.      Long Term GOALS: 4-8 weeks. Pt agrees with goals set.  1. Pt will return to cutting his grass independently.   2. Pt will return to playing 18 holes of golf independently.   3. Pt will utilize left shoulder to put up his dishes independently without limitation.   4. Pt will increase left shoulder flexion and abduction AROM to >/= 125 degrees for improved ability to perform overhead tasks.   5. Pt will lift 15# object from eye level to above head utilizing both UE's for  improved lifting capacity.        Plan     Progress with AROM and add resistance as tolerated.     Therapist: Eros Sher, PTA  3/7/2022

## 2022-03-10 ENCOUNTER — CLINICAL SUPPORT (OUTPATIENT)
Dept: REHABILITATION | Facility: HOSPITAL | Age: 57
End: 2022-03-10
Attending: ORTHOPAEDIC SURGERY
Payer: COMMERCIAL

## 2022-03-10 DIAGNOSIS — M25.612 DECREASED RANGE OF MOTION OF LEFT SHOULDER: ICD-10-CM

## 2022-03-10 DIAGNOSIS — G89.29 CHRONIC LEFT SHOULDER PAIN: ICD-10-CM

## 2022-03-10 DIAGNOSIS — R29.898 SHOULDER WEAKNESS: ICD-10-CM

## 2022-03-10 DIAGNOSIS — M25.512 CHRONIC LEFT SHOULDER PAIN: ICD-10-CM

## 2022-03-10 PROCEDURE — 97110 THERAPEUTIC EXERCISES: CPT | Mod: PN

## 2022-03-10 NOTE — PROGRESS NOTES
Physical Therapy Daily Note     Name: José Miguel Aguirre [facility] Number: [id number]  Diagnosis:   Encounter Diagnoses   Name Primary?    Chronic left shoulder pain     Decreased range of motion of left shoulder     Shoulder weakness      Physician: Ezekiel Schaefer MD  Precautions: Standard and Diabetes  Authorization Period Expiration: 02/15/2023  Progress note completed: 03/10/2022  Plan of Care Expiration: 04/13/2022  FOTO: updated on 2/28/2022  Visit #: 8 of 12  PTA Visit #: 0    Time In: 08:55 AM  Time Out: 9:46 AM  Total treatment time: 51 minutes     Subjective     Pt reports: No new c/o's. Pt compliant with initial HEP.   Pain Scale: José Miguel rates pain on a scale of 0-10 to be 5 currently.    Objective   UPDATE    Shoulder Range of Motion:   ACTIVE ROM LEFT   Flexion 105 degrees   Abduction 90 degrees   IR Sacrum   ER Occiput      PASSIVE ROM LEFT   Flexion 120 degrees   Abduction 92 degrees (pain)   IR 54 degrees   ER 50 degrees            STRENGTH LEFT   Flexion 3-/5   Abduction 3-/5   IR 4-/5 (avaiable ROM)   ER 3+/5 (available ROM)   Scapular protraction 4-/5         José Miguel received individual therapeutic exercises to develop strength, endurance, and ROM for 40 minutes including:  Pulleys flex/scaption x 3 min each  Standing UE [place] x 3 min (on floor)  Side lying ER 3 x 10  Table shoulder flexion slides 2 x 6 with 3'' holds  Standing dowel Shld IR/ER x 20  Supine Serratus Punches 2 x 10 2#  Supine Cane Shld Flexion and abduction x 12 ea  Supine shoulder flexion AAROM with assist from therapist 2 x 10  Standing dowel shld ext x 15  Isometric shoulder flexion/abd/IR/ER wall 5 sec holds x 10 ea  Standing rows with 3# x 12  TB ER with yellow 1 x 8; one set without resistance x 10 standing and towel at side  UBE L3 4'/4'   Elbow flexion with green TB 2 x 10 in sitting (supinated forearm)  Finger walks on wall into shoulder flexion and abduction (attempted  but unable) x 8 ea  Side lying shoulder abduction 2 x 10    DNP  José Miguel received the following manual therapy techniques:  PROM x  min to left shoulder into all motions especially flexion/abd/ER and posterior joint glides grade II/III       The patient received the following direct contact modalities after being cleared for contraindications:   May utilize estim russian to infraspinatus as pt can tolerate for ER strength     The patient received the following supervised modalities after being cleared for contradictions:     Written Home Exercises Provided: scanned into chart via [employer]  Pt demo good understanding of the education provided. José Miguel demonstrated good return demonstration of activities.     Education provided re:  José Miguel verbalized good understanding of education provided.   No spiritual or educational barriers to learning provided    Assessment   Pt has completed 8 physical therapy sessions to address left shoulder deficits and left rotator cuff arthropathy s/p rotator cuff repair on December 16th, 2021. Pt states he can reach in the cabinets better. Pt states his arm is still sore. Pt has improved his range of motion and shoulder strength although he is progressing slowly towards his goals due to painful weakness. Pt is most limited with external rotation strength and ROM as well as abduction ROM. Recommend continuing current POC date (04/13/2022) but with greater visit number (4 more approved visits for total of 16). Pt is agreeable to this plan.     Pt tolerated therapy fair including therex without incident as instructed by therapist. Pt c/o pain during overhead movements and requiring cues not to push through pain. Pt most limited with abd and ER during PROM. Pt required verbal and tactile cues to avoid wrist extension and abduction compensations during shoulder ER in side lying. Pt unable to perform shoulder abduction finger walks on wall successfully and this was discontinued. Pt with increased pain  following session but he did not quantify.      This is a 56 y.o. male referred to outpatient physical therapy and presents with a medical diagnosis of left rotator cuff arthropathy s/p rotator cuff repair. Pt's physical therapy impairments consist of left shoulder pain, decreased left shoulder range of motion, and muscle weakness which are limiting his ability to dress, bathe, perform household chores, golf, and mow his lawn and demonstrates limitations as described in the problem list. Pt prognosis is Good. Pt will continue to benefit from skilled outpatient physical therapy to address the deficits listed in the problem list, provide pt/family education and to maximize pt's level of independence in the home and community environment.     GOALS:  Short Term GOALS: 1-3 weeks. Pt agrees with goals set.   1. Pt will be complaint and independent with home exercise program. Goal met 03/10/2022   2. Pt will increase PROM of left shoulder into flexion and abduction to >/= 100 degrees. Progressing; goal almost met 03/10/2022   3. Pt will increase left shoulder strength via MMT to >/= 3+/5. Progressing 03/10/2022     Long Term GOALS: 4-8 weeks. Pt agrees with goals set.  1. Pt will return to cutting his grass independently. No progress  2. Pt will return to playing 18 holes of golf independently. No progress   3. Pt will utilize left shoulder to put up his dishes independently without limitation. Minimal progress  4. Pt will increase left shoulder flexion and abduction AROM to >/= 125 degrees for improved ability to perform overhead tasks. Some progress   5. Pt will lift 15# object from eye level to above head utilizing both UE's for improved lifting capacity. Minimal progress       Plan   Continue with updated POC towards PT established goals.     Plan of care: 3/10/2022 to 04/13/2022  Recommended Treatment Plan: 2 times per week for 4 weeks (4 more approved visits for total of 16): infraspinatus and teres minor  strengthening (external rotators) and progress abduction ROM; electrical stimulation to infraspinatus; therapeutic exercises, NM re-education, and therapeutic activities; modalities and manual therapy.       Therapist: Ronald Morgan, PT  3/10/2022

## 2022-03-10 NOTE — PLAN OF CARE
Physical Therapy Progress Note     Name: José Miguel Aguirre [facility] Number: [id number]  Diagnosis:   Encounter Diagnoses   Name Primary?    Chronic left shoulder pain     Decreased range of motion of left shoulder     Shoulder weakness      Physician: Ezekiel Schaefer MD  Precautions: Standard and Diabetes  Authorization Period Expiration: 02/15/2023  Progress note completed: 03/10/2022  Plan of Care Expiration: 04/13/2022  FOTO: updated on 2/28/2022  Visit #: 8 of 12  PTA Visit #: 0    Time In: 08:55 AM  Time Out: 9:46 AM  Total treatment time: 51 minutes     Subjective     Pt reports: No new c/o's. Pt compliant with initial HEP.   Pain Scale: José Miguel rates pain on a scale of 0-10 to be 5 currently.    Objective   UPDATE    Shoulder Range of Motion:   ACTIVE ROM LEFT   Flexion 105 degrees   Abduction 90 degrees   IR Sacrum   ER Occiput      PASSIVE ROM LEFT   Flexion 120 degrees   Abduction 92 degrees (pain)   IR 54 degrees   ER 50 degrees            STRENGTH LEFT   Flexion 3-/5   Abduction 3-/5   IR 4-/5 (avaiable ROM)   ER 3+/5 (available ROM)   Scapular protraction 4-/5         José Miguel received individual therapeutic exercises to develop strength, endurance, and ROM for 40 minutes including:  Pulleys flex/scaption x 3 min each  Standing UE [place] x 3 min (on floor)  Side lying ER 3 x 10  Table shoulder flexion slides 2 x 6 with 3'' holds  Standing dowel Shld IR/ER x 20  Supine Serratus Punches 2 x 10 2#  Supine Cane Shld Flexion and abduction x 12 ea  Supine shoulder flexion AAROM with assist from therapist 2 x 10  Standing dowel shld ext x 15  Isometric shoulder flexion/abd/IR/ER wall 5 sec holds x 10 ea  Standing rows with 3# x 12  TB ER with yellow 1 x 8; one set without resistance x 10 standing and towel at side  TB IR with green x 10  UBE L3 4'/4'   Elbow flexion with green TB 2 x 10 in sitting (supinated forearm)  Finger walks on wall into shoulder flexion  and abduction (attempted but unable) x 8 ea  Side lying shoulder abduction 2 x 10    DNP  José Miguel received the following manual therapy techniques:  PROM x  min to left shoulder into all motions especially flexion/abd/ER and posterior joint glides grade II/III       The patient received the following direct contact modalities after being cleared for contraindications:   May utilize estim russian to infraspinatus (side lying position) as pt can tolerate for ER strength     The patient received the following supervised modalities after being cleared for contradictions:     Written Home Exercises Provided: scanned into chart via [employer]  Pt demo good understanding of the education provided. José Miguel demonstrated good return demonstration of activities.     Education provided re:  José Miguel verbalized good understanding of education provided.   No spiritual or educational barriers to learning provided    Assessment   Pt has completed 8 physical therapy sessions to address left shoulder deficits due to left rotator cuff arthropathy s/p rotator cuff repair on December 16th, 2021. Pt states he can reach in the cabinets better. Pt states his arm is still sore and limited. Pt has improved his range of motion and shoulder strength although he is progressing slowly towards his goals due to painful weakness. Pt also demonstrates motor control movement impairments. Pt is most limited with external rotation strength and ROM as well as abduction ROM. Recommend continuing current POC date (04/13/2022) but with greater visit number (4 more approved visits for total of 16). Pt is agreeable to this plan.     Pt tolerated therapy fair including therex without incident as instructed by therapist. Pt c/o pain during overhead movements and requiring cues not to push through pain. Pt most limited with abd and ER during PROM. Pt required verbal and tactile cues to avoid wrist extension and abduction compensations during shoulder ER in side lying. Pt unable  to perform shoulder abduction finger walks on wall successfully and this was discontinued. Pt with increased pain following session but he did not quantify.      This is a 56 y.o. male referred to outpatient physical therapy and presents with a medical diagnosis of left rotator cuff arthropathy s/p rotator cuff repair. Pt's physical therapy impairments consist of left shoulder pain, decreased left shoulder range of motion, and muscle weakness which are limiting his ability to dress, bathe, perform household chores, golf, and mow his lawn and demonstrates limitations as described in the problem list. Pt prognosis is Good. Pt will continue to benefit from skilled outpatient physical therapy to address the deficits listed in the problem list, provide pt/family education and to maximize pt's level of independence in the home and community environment.     GOALS:  Short Term GOALS: 1-3 weeks. Pt agrees with goals set.   1. Pt will be complaint and independent with home exercise program. Goal met 03/10/2022   2. Pt will increase PROM of left shoulder into flexion and abduction to >/= 100 degrees. Progressing; goal almost met 03/10/2022   3. Pt will increase left shoulder strength via MMT to >/= 3+/5. Progressing 03/10/2022     Long Term GOALS: 4-8 weeks. Pt agrees with goals set.  1. Pt will return to cutting his grass independently. No progress  2. Pt will return to playing 18 holes of golf independently. No progress   3. Pt will utilize left shoulder to put up his dishes independently without limitation. Minimal progress  4. Pt will increase left shoulder flexion and abduction AROM to >/= 125 degrees for improved ability to perform overhead tasks. Some progress   5. Pt will lift 15# object from eye level to above head utilizing both UE's for improved lifting capacity. Minimal progress       Plan   Continue with updated POC towards PT established goals.     Plan of care: 3/10/2022 to 04/13/2022  Recommended Treatment  Plan: 2 times per week for 4 weeks (4 more approved visits for total of 16): infraspinatus and teres minor strengthening (external rotators) and progress abduction ROM; electrical stimulation to infraspinatus; therapeutic exercises, NM re-education, and therapeutic activities; modalities and manual therapy.       Therapist: Ronald Morgan, PT  3/10/2022

## 2022-03-14 ENCOUNTER — CLINICAL SUPPORT (OUTPATIENT)
Dept: REHABILITATION | Facility: HOSPITAL | Age: 57
End: 2022-03-14
Attending: ORTHOPAEDIC SURGERY
Payer: COMMERCIAL

## 2022-03-14 DIAGNOSIS — M25.612 DECREASED RANGE OF MOTION OF LEFT SHOULDER: ICD-10-CM

## 2022-03-14 DIAGNOSIS — M25.512 LEFT SHOULDER PAIN, UNSPECIFIED CHRONICITY: Primary | ICD-10-CM

## 2022-03-14 PROCEDURE — 97110 THERAPEUTIC EXERCISES: CPT | Mod: PN,CQ

## 2022-03-14 NOTE — PROGRESS NOTES
Physical Therapy Daily Note     Name: José Miguel Aguirre [facility] Number: [id number]  Diagnosis:   Encounter Diagnoses   Name Primary?    Left shoulder pain, unspecified chronicity Yes    Decreased range of motion of left shoulder      Physician: Ezekiel Schaefer MD  Precautions: Standard and Diabetes  Authorization Period Expiration: 02/15/2023  Progress note completed: 03/10/2022  Plan of Care Expiration: 04/13/2022  FOTO: updated on 2/28/2022  Visit #: 9 of 12  PTA Visit #: 1    Time In: 9:00 AM  Time Out: 9:45 AM  Total treatment time: 45 minutes     Subjective     Pt reports: being aggravated at the slow progress.  Pain Scale: José Miguel rates pain on a scale of 0-10 to be 4 currently.    Objective   UPDATE    Shoulder Range of Motion:   ACTIVE ROM LEFT   Flexion 105 degrees   Abduction 90 degrees   IR Sacrum   ER Occiput      PASSIVE ROM LEFT   Flexion 120 degrees   Abduction 92 degrees (pain)   IR 54 degrees   ER 50 degrees            STRENGTH LEFT   Flexion 3-/5   Abduction 3-/5   IR 4-/5 (avaiable ROM)   ER 3+/5 (available ROM)   Scapular protraction 4-/5         José Miguel received individual therapeutic exercises to develop strength, endurance, and ROM for 40 minutes including:  Pulleys flex/scaption x 3 min each  Standing UE [place] x 3 min (on floor)  Side lying ER 3 x 10  Table shoulder flexion slides 2 x 6 with 3'' holds  Standing dowel Shld IR/ER x 20  Supine Serratus Punches 2 x 10 2#  Supine Cane Shld Flexion and abduction x 12 ea  Supine shoulder flexion AAROM with assist from therapist 2 x 10  Standing dowel shld ext x 15  Isometric shoulder flexion/abd/IR/ER wall 5 sec holds x 10 ea  Standing rows with 3# x 12  TB ER with yellow 1 x 8; one set without resistance x 10 standing and towel at side (increase pain noted-ice pack for 10 min after)  UBE L3 4'/4'   Elbow flexion with green TB 2 x 10 in sitting (supinated forearm)  Finger walks on wall into  shoulder flexion and abduction (attempted but unable) x 8 ea  Side lying shoulder abduction 2 x 10    Added right shoulder postural KT taping     DNP  José Miguel received the following manual therapy techniques:  PROM x  min to left shoulder into all motions especially flexion/abd/ER and posterior joint glides grade II/III       The patient received the following direct contact modalities after being cleared for contraindications:   May utilize estim russian to infraspinatus as pt can tolerate for ER strength     The patient received the following supervised modalities after being cleared for contradictions:     Written Home Exercises Provided: scanned into chart via [email or web address]  Pt demo good understanding of the education provided. José Miguel demonstrated good return demonstration of activities.     Education provided re:  José Miguel verbalized good understanding of education provided.   No spiritual or educational barriers to learning provided    Assessment   Patient reported sharp increase in pain during ER.  Added ice pack and consulted Odette Wheat PT, who added KT taping to improve posture. Pt states his arm is still sore. Pt has improved his range of motion and shoulder strength although he is progressing slowly towards his goals due to painful weakness. Pt is most limited with external rotation strength and ROM as well as abduction ROM.   Pt is agreeable to this plan.     Pt tolerated therapy fair including therex without incident as instructed by therapist. Pt c/o pain during overhead movements and requiring cues not to push through pain. Pt most limited with abd and ER during PROM. Pt required verbal and tactile cues to avoid wrist extension and abduction compensations during shoulder ER in side lying. Pt unable to perform shoulder abduction finger walks on wall successfully and this was discontinued. Pt with increased pain following session but he did not quantify.      This is a 56 y.o. male referred to outpatient physical therapy  and presents with a medical diagnosis of left rotator cuff arthropathy s/p rotator cuff repair. Pt's physical therapy impairments consist of left shoulder pain, decreased left shoulder range of motion, and muscle weakness which are limiting his ability to dress, bathe, perform household chores, golf, and mow his lawn and demonstrates limitations as described in the problem list. Pt prognosis is Good. Pt will continue to benefit from skilled outpatient physical therapy to address the deficits listed in the problem list, provide pt/family education and to maximize pt's level of independence in the home and community environment.     GOALS:  Short Term GOALS: 1-3 weeks. Pt agrees with goals set.   1. Pt will be complaint and independent with home exercise program. Goal met 03/10/2022   2. Pt will increase PROM of left shoulder into flexion and abduction to >/= 100 degrees. Progressing; goal almost met 03/10/2022   3. Pt will increase left shoulder strength via MMT to >/= 3+/5. Progressing 03/10/2022     Long Term GOALS: 4-8 weeks. Pt agrees with goals set.  1. Pt will return to cutting his grass independently. No progress  2. Pt will return to playing 18 holes of golf independently. No progress   3. Pt will utilize left shoulder to put up his dishes independently without limitation. Minimal progress  4. Pt will increase left shoulder flexion and abduction AROM to >/= 125 degrees for improved ability to perform overhead tasks. Some progress   5. Pt will lift 15# object from eye level to above head utilizing both UE's for improved lifting capacity. Minimal progress       Plan   Continue with updated POC towards PT established goals.     Plan of care: 3/14/2022 to 04/13/2022  Recommended Treatment Plan: 2 times per week for 4 weeks (4 more approved visits for total of 16): infraspinatus and teres minor strengthening (external rotators) and progress abduction ROM; electrical stimulation to infraspinatus; therapeutic  exercises, NM re-education, and therapeutic activities; modalities and manual therapy.       Therapist: Eros Sher, PTA  3/14/2022

## 2022-03-17 ENCOUNTER — CLINICAL SUPPORT (OUTPATIENT)
Dept: REHABILITATION | Facility: HOSPITAL | Age: 57
End: 2022-03-17
Attending: ORTHOPAEDIC SURGERY
Payer: COMMERCIAL

## 2022-03-17 DIAGNOSIS — M25.512 LEFT SHOULDER PAIN, UNSPECIFIED CHRONICITY: Primary | ICD-10-CM

## 2022-03-17 DIAGNOSIS — M25.612 DECREASED RANGE OF MOTION OF LEFT SHOULDER: ICD-10-CM

## 2022-03-17 PROCEDURE — 97110 THERAPEUTIC EXERCISES: CPT | Mod: PN,CQ

## 2022-03-17 NOTE — PROGRESS NOTES
Physical Therapy Daily Note     Name: José Miguel Aguirre [facility] Number: [id number]  Diagnosis:   Encounter Diagnoses   Name Primary?    Left shoulder pain, unspecified chronicity Yes    Decreased range of motion of left shoulder      Physician: Ezekiel Schaefer MD  Precautions: Standard and Diabetes  Authorization Period Expiration: 02/15/2023  Progress note completed: 03/10/2022  Plan of Care Expiration: 04/13/2022  FOTO: updated on 2/28/2022  Visit #: 10 of 12  PTA Visit #: 3    Time In: 9:00 AM  Time Out: 9:45 AM  Total treatment time: 45 minutes     Subjective     Pt reports: less pain and some improvement with movement since last visit.  Pain Scale: José Miguel rates pain on a scale of 0-10 to be 4 currently.    Objective   UPDATE    Shoulder Range of Motion:   ACTIVE ROM LEFT   Flexion 105 degrees   Abduction 90 degrees   IR Sacrum   ER Occiput      PASSIVE ROM LEFT   Flexion 120 degrees   Abduction 92 degrees (pain)   IR 54 degrees   ER 50 degrees            STRENGTH LEFT   Flexion 3-/5   Abduction 3-/5   IR 4-/5 (avaiable ROM)   ER 3+/5 (available ROM)   Scapular protraction 4-/5         José Miguel received individual therapeutic exercises to develop strength, endurance, and ROM for 40 minutes including:  Pulleys flex/scaption x 3 min each  Standing UE [place] x 3 min (on floor)  Side lying ER 3 x 10  Table shoulder flexion slides 2 x 6 with 3'' holds  Standing dowel Shld IR/ER x 20  Supine Serratus Punches 2 x 10 2#  Supine Cane Shld Flexion and abduction 2 x 10 ea  Supine shoulder flexion AAROM with assist from therapist 2 x 10  Standing dowel shld ext x 15  Isometric shoulder flexion/abd/IR/ER wall 5 sec holds x 10 ea  Standing rows with 3# x 12   TB ER with yellow 1 x 8; one set without resistance x 10 standing and towel at side (increase pain noted-ice pack for 10 min after)  UBE L3 4'/4'    Elbow flexion with green TB 2 x 10 in sitting (supinated  forearm)  Finger walks on wall into shoulder flexion and abduction (attempted but unable) x 8 ea  Side lying shoulder abduction 2 x 10    Re applied  right shoulder postural KT taping     DNP  José Miguel received the following manual therapy techniques:  PROM x  min to left shoulder into all motions especially flexion/abd/ER and posterior joint glides grade II/III       The patient received the following direct contact modalities after being cleared for contraindications:   May utilize estim russian to infraspinatus as pt can tolerate for ER strength     The patient received the following supervised modalities after being cleared for contradictions:     Written Home Exercises Provided: scanned into chart via [employer]  Pt demo good understanding of the education provided. José Miguel demonstrated good return demonstration of activities.     Education provided re:  José Miguel verbalized good understanding of education provided.   No spiritual or educational barriers to learning provided    Assessment   Pt has improved his range of motion and shoulder strength although he is progressing slowly towards his goals due to painful weakness. Pt is most limited with external rotation strength and ROM as well as abduction ROM.   Pt is agreeable to this plan.     Pt tolerated therapy fair including therex without incident as instructed by therapist. Pt c/o pain during overhead movements and requiring cues not to push through pain. Pt most limited with abd and ER during PROM. Pt required verbal and tactile cues to avoid wrist extension and abduction compensations during shoulder ER in side lying. Pt unable to perform shoulder abduction finger walks on wall successfully and this was discontinued. Pt with increased pain following session but he did not quantify.      This is a 56 y.o. male referred to outpatient physical therapy and presents with a medical diagnosis of left rotator cuff arthropathy s/p rotator cuff repair. Pt's physical therapy  impairments consist of left shoulder pain, decreased left shoulder range of motion, and muscle weakness which are limiting his ability to dress, bathe, perform household chores, golf, and mow his lawn and demonstrates limitations as described in the problem list. Pt prognosis is Good. Pt will continue to benefit from skilled outpatient physical therapy to address the deficits listed in the problem list, provide pt/family education and to maximize pt's level of independence in the home and community environment.     GOALS:  Short Term GOALS: 1-3 weeks. Pt agrees with goals set.   1. Pt will be complaint and independent with home exercise program. Goal met 03/10/2022   2. Pt will increase PROM of left shoulder into flexion and abduction to >/= 100 degrees. Progressing; goal almost met 03/10/2022   3. Pt will increase left shoulder strength via MMT to >/= 3+/5. Progressing 03/10/2022     Long Term GOALS: 4-8 weeks. Pt agrees with goals set.  1. Pt will return to cutting his grass independently. No progress  2. Pt will return to playing 18 holes of golf independently. No progress   3. Pt will utilize left shoulder to put up his dishes independently without limitation. Minimal progress  4. Pt will increase left shoulder flexion and abduction AROM to >/= 125 degrees for improved ability to perform overhead tasks. Some progress   5. Pt will lift 15# object from eye level to above head utilizing both UE's for improved lifting capacity. Minimal progress       Plan   Continue with updated POC towards PT established goals.     Plan of care: 3/17/2022 to 04/13/2022  Recommended Treatment Plan: 2 times per week for 4 weeks (4 more approved visits for total of 16): infraspinatus and teres minor strengthening (external rotators) and progress abduction ROM; electrical stimulation to infraspinatus; therapeutic exercises, NM re-education, and therapeutic activities; modalities and manual therapy.       Therapist: Eros Sher  PTA  3/17/2022

## 2022-03-23 ENCOUNTER — CLINICAL SUPPORT (OUTPATIENT)
Dept: REHABILITATION | Facility: HOSPITAL | Age: 57
End: 2022-03-23
Attending: ORTHOPAEDIC SURGERY
Payer: COMMERCIAL

## 2022-03-23 DIAGNOSIS — M25.612 DECREASED RANGE OF MOTION OF LEFT SHOULDER: ICD-10-CM

## 2022-03-23 DIAGNOSIS — M25.512 LEFT SHOULDER PAIN, UNSPECIFIED CHRONICITY: Primary | ICD-10-CM

## 2022-03-23 PROCEDURE — 97110 THERAPEUTIC EXERCISES: CPT | Mod: PN,CQ

## 2022-03-23 NOTE — PROGRESS NOTES
Physical Therapy Daily Note     Name: José Miguel Aguirre [facility] Number: [id number]  Diagnosis:   Encounter Diagnoses   Name Primary?    Left shoulder pain, unspecified chronicity Yes    Decreased range of motion of left shoulder      Physician: Ezekiel Schaefer MD  Precautions: Standard and Diabetes  Authorization Period Expiration: 02/15/2023  Progress note completed: 03/10/2022  Plan of Care Expiration: 04/13/2022  FOTO: updated on 2/28/2022  Visit #: 11 of 12  PTA Visit #: 4    Time In: 8:50 AM  Time Out: 9:40 AM  Total treatment time: 45 minutes     Subjective     Pt reports: his right shoulder (other shoulder) is now bothering him (4-5/10 pain).  Left shoulder is improving.  Pain Scale: José Miguel rates pain on a scale of 0-10 to be 3-4 currently.    Objective   UPDATE    Shoulder Range of Motion:   ACTIVE ROM LEFT   Flexion 105 degrees   Abduction 90 degrees   IR Sacrum   ER Occiput      PASSIVE ROM LEFT   Flexion 120 degrees   Abduction 92 degrees (pain)   IR 54 degrees   ER 50 degrees            STRENGTH LEFT   Flexion 3-/5   Abduction 3-/5   IR 4-/5 (avaiable ROM)   ER 3+/5 (available ROM)   Scapular protraction 4-/5         José Miguel received individual therapeutic exercises to develop strength, endurance, and ROM for 40 minutes including:  Pulleys flex/scaption x 3 min each  Standing UE [place] x 3 min (on floor)  Side lying ER 3 x 10  Table shoulder flexion slides 2 x 6 with 3'' holds  Standing dowel Shld IR/ER x 20  Supine Serratus Punches 2 x 10 2#  Supine Cane Shld Flexion and abduction 2 x 10 ea  Supine shoulder flexion AAROM with assist from therapist 2 x 10  Standing dowel shld ext x 15  Isometric shoulder flexion/abd/IR/ER wall 5 sec holds x 10 ea  Standing rows with 3# x 12   TB ER with yellow 1 x 8; one set without resistance x 10 standing and towel at side (increase pain noted-ice pack for 10 min after)  UBE L3 4'/4'    Elbow flexion with green TB 2 x  10 in sitting (supinated forearm)  Finger walks on wall into shoulder flexion and abduction (attempted but unable) x 8 ea  Side lying shoulder abduction 2 x 10    Re applied  right shoulder postural KT taping     DNP  José Miguel received the following manual therapy techniques:  PROM x  min to left shoulder into all motions especially flexion/abd/ER and posterior joint glides grade II/III       The patient received the following direct contact modalities after being cleared for contraindications:   May utilize estim russian to infraspinatus as pt can tolerate for ER strength     The patient received the following supervised modalities after being cleared for contradictions:     Written Home Exercises Provided: scanned into chart via [employer]cope  Pt demo good understanding of the education provided. José Miguel demonstrated good return demonstration of activities.     Education provided re:  José Miguel verbalized good understanding of education provided.   No spiritual or educational barriers to learning provided    Assessment   Pt has improved but is still having pain with his active range of motion due to sharp pain.  Patient likes the KT taping.    Pt tolerated therapy fair including therex without incident as instructed by therapist. Pt c/o pain during overhead movements and requiring cues not to push through pain. Pt most limited with abd and ER during PROM.     This is a 56 y.o. male referred to outpatient physical therapy and presents with a medical diagnosis of left rotator cuff arthropathy s/p rotator cuff repair. Pt's physical therapy impairments consist of left shoulder pain, decreased left shoulder range of motion, and muscle weakness which are limiting his ability to dress, bathe, perform household chores, golf, and mow his lawn and demonstrates limitations as described in the problem list. Pt prognosis is Good. Pt will continue to benefit from skilled outpatient physical therapy to address the deficits listed in the problem list,  provide pt/family education and to maximize pt's level of independence in the home and community environment.     GOALS:  Short Term GOALS: 1-3 weeks. Pt agrees with goals set.   1. Pt will be complaint and independent with home exercise program. Goal met 03/10/2022   2. Pt will increase PROM of left shoulder into flexion and abduction to >/= 100 degrees. Progressing; goal almost met 03/10/2022   3. Pt will increase left shoulder strength via MMT to >/= 3+/5. Progressing 03/10/2022     Long Term GOALS: 4-8 weeks. Pt agrees with goals set.  1. Pt will return to cutting his grass independently. No progress  2. Pt will return to playing 18 holes of golf independently. No progress   3. Pt will utilize left shoulder to put up his dishes independently without limitation. Minimal progress  4. Pt will increase left shoulder flexion and abduction AROM to >/= 125 degrees for improved ability to perform overhead tasks. Some progress   5. Pt will lift 15# object from eye level to above head utilizing both UE's for improved lifting capacity. Minimal progress       Plan   Continue with updated POC towards PT established goals.     Plan of care: 3/23/2022 to 04/13/2022  Recommended Treatment Plan: 2 times per week for 4 weeks (4 more approved visits for total of 16): infraspinatus and teres minor strengthening (external rotators) and progress abduction ROM; electrical stimulation to infraspinatus; therapeutic exercises, NM re-education, and therapeutic activities; modalities and manual therapy.       Therapist: Eros Sher, LEVY  3/23/2022

## 2022-03-24 ENCOUNTER — CLINICAL SUPPORT (OUTPATIENT)
Dept: REHABILITATION | Facility: HOSPITAL | Age: 57
End: 2022-03-24
Attending: ORTHOPAEDIC SURGERY
Payer: COMMERCIAL

## 2022-03-24 DIAGNOSIS — M25.612 DECREASED RANGE OF MOTION OF LEFT SHOULDER: Primary | ICD-10-CM

## 2022-03-24 DIAGNOSIS — G89.29 CHRONIC LEFT SHOULDER PAIN: ICD-10-CM

## 2022-03-24 DIAGNOSIS — M25.512 CHRONIC LEFT SHOULDER PAIN: ICD-10-CM

## 2022-03-24 PROCEDURE — 97110 THERAPEUTIC EXERCISES: CPT | Mod: PN,CQ

## 2022-03-24 NOTE — PROGRESS NOTES
Physical Therapy Daily Note     Name: José Miguel Aguirre [facility] Number: [id number]  Diagnosis:   Encounter Diagnoses   Name Primary?    Chronic left shoulder pain     Decreased range of motion of left shoulder Yes     Physician: Ezekiel Schaefer MD  Precautions: Standard and Diabetes  Authorization Period Expiration: 02/15/2023  Progress note completed: 03/10/2022  Plan of Care Expiration: 04/13/2022  FOTO: updated on 2/28/2022  Visit #: 12 of 12  PTA Visit #: 4    Time In: 10:15 AM  Time Out:  11:00 AM  Total treatment time: 45 minutes     Subjective     Pt reports: his right shoulder (other shoulder) is now bothering him (4-5/10 pain).  Left shoulder is improving.  Pain Scale: José Miguel rates pain on a scale of 0-10 to be 4 currently.    Objective   UPDATE    Shoulder Range of Motion:   ACTIVE ROM LEFT   Flexion 105 degrees   Abduction 90 degrees   IR Sacrum   ER Occiput      PASSIVE ROM LEFT   Flexion 120 degrees   Abduction 92 degrees (pain)   IR 54 degrees   ER 50 degrees            STRENGTH LEFT   Flexion 3-/5   Abduction 3-/5   IR 4-/5 (avaiable ROM)   ER 3+/5 (available ROM)   Scapular protraction 4-/5         José Miguel received individual therapeutic exercises to develop strength, endurance, and ROM for 40 minutes including:  Pulleys flex/scaption x 3 min each  Standing UE [place] x 3 min (on floor)  Side lying ER 3 x 10  Table shoulder flexion slides 2 x 6 with 3'' holds  Standing dowel Shld IR/ER x 20  Supine Serratus Punches 2 x 10 2#  Supine Cane Shld Flexion and abduction 2 x 10 ea  Supine shoulder flexion AAROM with assist from therapist 2 x 10  Standing dowel shld ext x 15  Isometric shoulder flexion/abd/IR/ER wall 5 sec holds x 10 ea  Standing rows with 3# x 12   TB ER with yellow 1 x 8; one set without resistance x 10 standing and towel at side (increase pain noted-ice pack for 10 min after)  UBE L3 4'/4'    Elbow flexion with green TB 2 x 10 in sitting  (supinated forearm)  Finger walks on wall into shoulder flexion and abduction (attempted but unable) x 8 ea  Side lying shoulder abduction 2 x 10    Re applied  right shoulder postural KT taping     DNP  José Miguel received the following manual therapy techniques:  PROM x  min to left shoulder into all motions especially flexion/abd/ER and posterior joint glides grade II/III       The patient received the following direct contact modalities after being cleared for contraindications:   May utilize estim russian to infraspinatus as pt can tolerate for ER strength     The patient received the following supervised modalities after being cleared for contradictions:     Written Home Exercises Provided: scanned into chart via [employer]  Pt demo good understanding of the education provided. José Miguel demonstrated good return demonstration of activities.     Education provided re:  José Miguel verbalized good understanding of education provided.   No spiritual or educational barriers to learning provided    Assessment   Pt has improved but is still having pain with his active range of motion due to sharp pain.  Patient likes the KT taping.    Pt tolerated therapy fair including therex without incident as instructed by therapist. Pt c/o pain during overhead movements and requiring cues not to push through pain. Pt most limited with abd and ER during PROM.     This is a 56 y.o. male referred to outpatient physical therapy and presents with a medical diagnosis of left rotator cuff arthropathy s/p rotator cuff repair. Pt's physical therapy impairments consist of left shoulder pain, decreased left shoulder range of motion, and muscle weakness which are limiting his ability to dress, bathe, perform household chores, golf, and mow his lawn and demonstrates limitations as described in the problem list. Pt prognosis is Good. Pt will continue to benefit from skilled outpatient physical therapy to address the deficits listed in the problem list, provide  pt/family education and to maximize pt's level of independence in the home and community environment.     GOALS:  Short Term GOALS: 1-3 weeks. Pt agrees with goals set.   1. Pt will be complaint and independent with home exercise program. Goal met 03/10/2022   2. Pt will increase PROM of left shoulder into flexion and abduction to >/= 100 degrees. Progressing; goal almost met 03/10/2022   3. Pt will increase left shoulder strength via MMT to >/= 3+/5. Progressing 03/10/2022     Long Term GOALS: 4-8 weeks. Pt agrees with goals set.  1. Pt will return to cutting his grass independently. No progress  2. Pt will return to playing 18 holes of golf independently. No progress   3. Pt will utilize left shoulder to put up his dishes independently without limitation. Minimal progress  4. Pt will increase left shoulder flexion and abduction AROM to >/= 125 degrees for improved ability to perform overhead tasks. Some progress   5. Pt will lift 15# object from eye level to above head utilizing both UE's for improved lifting capacity. Minimal progress       Plan   Continue with updated POC towards PT established goals.     Plan of care: 3/24/2022 to 04/13/2022  Recommended Treatment Plan: 2 times per week for 4 weeks (4 more approved visits for total of 16): infraspinatus and teres minor strengthening (external rotators) and progress abduction ROM; electrical stimulation to infraspinatus; therapeutic exercises, NM re-education, and therapeutic activities; modalities and manual therapy.       Therapist: Jonathan Favre, PTA  3/24/2022

## 2022-03-28 ENCOUNTER — CLINICAL SUPPORT (OUTPATIENT)
Dept: REHABILITATION | Facility: HOSPITAL | Age: 57
End: 2022-03-28
Attending: ORTHOPAEDIC SURGERY
Payer: COMMERCIAL

## 2022-03-28 DIAGNOSIS — G89.29 CHRONIC LEFT SHOULDER PAIN: Primary | ICD-10-CM

## 2022-03-28 DIAGNOSIS — M25.612 DECREASED RANGE OF MOTION OF LEFT SHOULDER: ICD-10-CM

## 2022-03-28 DIAGNOSIS — R29.898 SHOULDER WEAKNESS: ICD-10-CM

## 2022-03-28 DIAGNOSIS — M25.512 CHRONIC LEFT SHOULDER PAIN: Primary | ICD-10-CM

## 2022-03-28 PROCEDURE — 97110 THERAPEUTIC EXERCISES: CPT | Mod: PN

## 2022-03-28 PROCEDURE — 97140 MANUAL THERAPY 1/> REGIONS: CPT | Mod: PN

## 2022-03-28 NOTE — PROGRESS NOTES
"[facility]   Physical Therapy Treatment Note     Name: José Miguel Aguirre [facility]  Clinic Number: [id number]    Therapy Diagnosis:   Encounter Diagnoses   Name Primary?    Chronic left shoulder pain Yes    Decreased range of motion of left shoulder     Shoulder weakness      Physician: Ezekiel Schaefer MD    Visit Date: 3/28/2022    Physician Orders: PT Eval and Treat   Medical Diagnosis from Referral: Left rotator cuff tear arthropathy   Evaluation Date: 2/16/2022  Authorization Period Expiration: 02/15/2023  Plan of Care Expiration: 04/13/2022  Progress Note Due: completed 3/10/2022  Visit # / Visits authorized: 13/16  PTA Visit #: 0     Precautions: Standard and Diabetes     PTA Visit #: 0/5     Time In: 0802  Time Out: 0850  Total Billable Time: 45 minutes    SUBJECTIVE     Pt reports: "It's kind of all over, not really just in the front".  He was compliant with home exercise program.  Response to previous treatment: A little sore  Functional change: Independent in bathing and dressing but still having trouble putting stuff in the cabinet    Pain: 4/10  Location: right shoulder      OBJECTIVE     Objective Measures updated at progress report unless specified.     Treatment     José Miguel received the treatments listed below:      therapeutic exercises to develop strength, ROM and flexibility for 35 minutes including:   UBE L4 4'/4'     Pulleys flex/scaption x 2 min each   Standing rows with red theraband 2 x 10    Standing dowel Shld IR/ER x 20   Standing dowel shoulder abduction 2# 2x10   Standing dowel shld ext 2# 2 x 10   Supine Serratus Punches 2 x 10 2#   Supine Cane Shld Flexion 2# 2 x 10 ea   Side lying ER 3 x 10     Did not perform this date.   Standing UE [place] x 3 min (on floor)   Supine shoulder flexion AAROM with assist from therapist 2 x 10   Isometric shoulder flexion/abd/IR/ER wall 5 sec holds x 10 ea   TB ER with yellow 1 x 8; one set without resistance x 10 standing and " towel at side (increase pain noted-ice pack for 10 min after)   Elbow flexion with green TB 2 x 10 in sitting (supinated forearm)   Finger walks on wall into shoulder flexion and abduction (attempted but unable) x 8 ea   Side lying shoulder abduction 2 x 10    manual therapy techniques: Joint mobilizations and PROM were applied to the: L shoulder for 10 minutes, including:   Grade II/III superior to inferior and anterior to posterior GH joint glides   PROM/stretching to L shoulder      Patient Education and Home Exercises     Home Exercises Provided and Patient Education Provided     Education provided:   - Transitioned from supine shoulder abd using dowel to standing.  Instructed pt to control movement when lowering arm from elevated position    Written Home Exercises Provided: Patient instructed to cont prior HEP. Exercises were reviewed and José Miguel was able to demonstrate them prior to the end of the session.  José Miguel demonstrated good  understanding of the education provided. See EMR under Patient Instructions for exercises provided during therapy sessions    ASSESSMENT     Good tolerance for Passive stretching.  Limited eccentric control of L shoulder elevation.      José Miguel Is progressing well towards his goals.   Pt prognosis is Good.     Pt will continue to benefit from skilled outpatient physical therapy to address the deficits listed in the problem list box on initial evaluation, provide pt/family education and to maximize pt's level of independence in the home and community environment.     Pt's spiritual, cultural and educational needs considered and pt agreeable to plan of care and goals.     Anticipated barriers to physical therapy: Diabetes    Goals:   Short Term GOALS: 1-3 weeks. Pt agrees with goals set.   1. Pt will be complaint and independent with home exercise program. Goal met 03/10/2022   2. Pt will increase PROM of left shoulder into flexion and abduction to >/= 100 degrees. Goal met 03/28/2022   3. Pt  will increase left shoulder strength via MMT to >/= 3+/5. Progressing 03/28/2022     Long Term GOALS: 4-8 weeks. Pt agrees with goals set.  1. Pt will return to cutting his grass independently. Ongoing  2. Pt will return to playing 18 holes of golf independently. Ongoing  3. Pt will utilize left shoulder to put up his dishes independently without limitation. Minimal progress  4. Pt will increase left shoulder flexion and abduction AROM to >/= 125 degrees for improved ability to perform overhead tasks. Progressing   5. Pt will lift 15# object from eye level to above head utilizing both UE's for improved lifting capacity. Minimal progress      PLAN     Joint mobilization to GH joint and AC joint, PROM/stretching, progressive strengthening of L shoulder.     Tash Savage, PT

## 2022-03-29 ENCOUNTER — OFFICE VISIT (OUTPATIENT)
Dept: ORTHOPEDICS | Facility: CLINIC | Age: 57
End: 2022-03-29
Payer: COMMERCIAL

## 2022-03-29 VITALS — HEIGHT: 67 IN | WEIGHT: 265 LBS | BODY MASS INDEX: 41.59 KG/M2

## 2022-03-29 DIAGNOSIS — M75.102 LEFT ROTATOR CUFF TEAR ARTHROPATHY: ICD-10-CM

## 2022-03-29 DIAGNOSIS — Z98.890 STATUS POST LEFT ROTATOR CUFF REPAIR: Primary | ICD-10-CM

## 2022-03-29 DIAGNOSIS — Z98.890 STATUS POST RIGHT ROTATOR CUFF REPAIR: ICD-10-CM

## 2022-03-29 DIAGNOSIS — M12.812 LEFT ROTATOR CUFF TEAR ARTHROPATHY: ICD-10-CM

## 2022-03-29 PROCEDURE — 99213 PR OFFICE/OUTPT VISIT, EST, LEVL III, 20-29 MIN: ICD-10-PCS | Mod: S$GLB,,, | Performed by: ORTHOPAEDIC SURGERY

## 2022-03-29 PROCEDURE — 1159F MED LIST DOCD IN RCRD: CPT | Mod: CPTII,S$GLB,, | Performed by: ORTHOPAEDIC SURGERY

## 2022-03-29 PROCEDURE — 99213 OFFICE O/P EST LOW 20 MIN: CPT | Mod: S$GLB,,, | Performed by: ORTHOPAEDIC SURGERY

## 2022-03-29 PROCEDURE — 1159F PR MEDICATION LIST DOCUMENTED IN MEDICAL RECORD: ICD-10-PCS | Mod: CPTII,S$GLB,, | Performed by: ORTHOPAEDIC SURGERY

## 2022-03-29 PROCEDURE — 99999 PR PBB SHADOW E&M-EST. PATIENT-LVL III: CPT | Mod: PBBFAC,,, | Performed by: ORTHOPAEDIC SURGERY

## 2022-03-29 PROCEDURE — 99999 PR PBB SHADOW E&M-EST. PATIENT-LVL III: ICD-10-PCS | Mod: PBBFAC,,, | Performed by: ORTHOPAEDIC SURGERY

## 2022-03-29 PROCEDURE — 3008F BODY MASS INDEX DOCD: CPT | Mod: CPTII,S$GLB,, | Performed by: ORTHOPAEDIC SURGERY

## 2022-03-29 PROCEDURE — 3008F PR BODY MASS INDEX (BMI) DOCUMENTED: ICD-10-PCS | Mod: CPTII,S$GLB,, | Performed by: ORTHOPAEDIC SURGERY

## 2022-03-29 RX ORDER — HYDROCODONE BITARTRATE AND ACETAMINOPHEN 7.5; 325 MG/1; MG/1
1 TABLET ORAL
Qty: 28 TABLET | Refills: 0 | Status: SHIPPED | OUTPATIENT
Start: 2022-03-29 | End: 2022-05-10

## 2022-03-29 NOTE — PROGRESS NOTES
3/29/2022    Past Medical History:   Diagnosis Date    COVID-19 02/2020    Diabetes mellitus     type 2    DVT (deep venous thrombosis) 2011    Approx. 2011, after having a bunion removed on left. pt was on warfarin for approx. 6 weeks. unsure of time    Hypertension     Sickle cell anemia trait     pt states all his life    Sleep apnea     USES CPAP    Wears glasses        Past Surgical History:   Procedure Laterality Date    ARTHROSCOPIC REPAIR OF ROTATOR CUFF OF SHOULDER Left 12/16/2021    Procedure: REPAIR, ROTATOR CUFF, ARTHROSCOPIC;  Surgeon: Ezekiel Schaefer MD;  Location: [facility] OR;  Service: Orthopedics;  Laterality: Left;    ARTHROSCOPIC TENOTOMY OF BICEPS TENDON Left 12/16/2021    Procedure: TENOTOMY, BICEPS, ARTHROSCOPIC;  Surgeon: Ezekiel Schaefer MD;  Location: [facility] OR;  Service: Orthopedics;  Laterality: Left;    ARTHROSCOPY OF SHOULDER WITH DECOMPRESSION OF SUBACROMIAL SPACE Left 12/16/2021    Procedure: ARTHROSCOPY, SHOULDER, WITH SUBACROMIAL SPACE DECOMPRESSION;  Surgeon: Ezekiel Schaefer MD;  Location: [facility] OR;  Service: Orthopedics;  Laterality: Left;  GENERAL AND BLOCK    BUNIONECTOMY Left 2006    Pt developed a blood clot in leg, after having bunion removed. Had to do coumadin for approx. 6 weeks.    BUNIONECTOMY Left 2010    complicated by dvt    COLONOSCOPY N/A 11/17/2016    Procedure: COLONOSCOPY;  Surgeon: Keegan Vo MD;  Location: [facility];  Service: Endoscopy;  Laterality: N/A;    COLONOSCOPY N/A 12/23/2020    Procedure: COLONOSCOPY;  Surgeon: Keegan Vo MD;  Location: [facility];  Service: Endoscopy;  Laterality: N/A;    EPIDURAL STEROID INJECTION  2019    lumbar    ESOPHAGOGASTRODUODENOSCOPY N/A 12/23/2020    Procedure: EGD (ESOPHAGOGASTRODUODENOSCOPY);  Surgeon: Keegan Vo MD;  Location: [facility] ENDO;  Service: Endoscopy;  Laterality: N/A;    INTRALUMINAL GASTROINTESTINAL TRACT IMAGING VIA CAPSULE N/A 12/30/2020    Procedure: IMAGING PROCEDURE, GI  TRACT, INTRALUMINAL, VIA CAPSULE;  Surgeon: Keegan Vo MD;  Location: [facility];  Service: Endoscopy;  Laterality: N/A;    JOINT REPLACEMENT      right    KNEE SURGERY Right     SHOULDER SURGERY Right     SHOULDER SURGERY Right     VASECTOMY Bilateral 2011       Current Outpatient Medications   Medication Sig    albuterol-ipratropium (DUO-NEB) 2.5 mg-0.5 mg/3 mL nebulizer solution Take 3 mLs by nebulization every 6 (six) hours as needed for Wheezing or Shortness of Breath. Rescue    atorvastatin (LIPITOR) 20 MG tablet Take 1 tablet (20 mg total) by mouth once daily.    blood sugar diagnostic Strp 1 strip by Misc.(Non-Drug; Combo Route) route 2 (two) times daily.    ferrous sulfate (FEOSOL) 325 mg (65 mg iron) Tab tablet Take 1 tablet (325 mg total) by mouth 4 (four) times a week.    HYDROcodone-acetaminophen (NORCO) 7.5-325 mg per tablet Take 1 tablet by mouth every 6 (six) hours as needed for Pain.    ibuprofen (ADVIL,MOTRIN) 800 MG tablet Take 1 tablet (800 mg total) by mouth 3 (three) times daily.    ibuprofen (ADVIL,MOTRIN) 800 MG tablet TAKE 1 TABLET(800 MG) BY MOUTH THREE TIMES DAILY    lancets Misc 1 each by Misc.(Non-Drug; Combo Route) route 2 (two) times daily.    lancing device (BD LANCET DEVICE) Misc 1 each by Misc.(Non-Drug; Combo Route) route 2 (two) times daily.    metFORMIN (GLUCOPHAGE) 1000 MG tablet Take 1 tablet (1,000 mg total) by mouth 2 (two) times daily with meals.    pantoprazole (PROTONIX) 40 MG tablet TAKE 1 TABLET(40 MG) BY MOUTH EVERY DAY    sucralfate (CARAFATE) 1 gram tablet Take 1 g by mouth 4 (four) times daily.    TURMERIC ORAL Take by mouth.    albuterol (PROVENTIL HFA) 90 mcg/actuation inhaler Inhale 2 puffs into the lungs every 6 (six) hours as needed for Wheezing. Rescue    ketoconazole (NIZORAL) 2 % cream Apply topically 2 (two) times daily.     No current facility-administered medications for this visit.     Facility-Administered Medications Ordered in  Other Visits   Medication    diphenhydrAMINE injection 25 mg    electrolyte-S (ISOLYTE)    fentaNYL 50 mcg/mL injection 25 mcg    HYDROmorphone (PF) injection 0.2 mg    lactated ringers infusion    LIDOcaine (PF) 10 mg/ml (1%) injection 5 mg    ondansetron injection 4 mg    oxyCODONE immediate release tablet 5 mg    sodium chloride 0.9% flush 3 mL    sodium chloride 0.9% flush 3 mL       Review of patient's allergies indicates:   Allergen Reactions    Lisinopril Other (See Comments)     cough    Losartan Other (See Comments)     cough       Family History   Problem Relation Age of Onset    Heart block Mother     Kidney disease Mother     Cancer Paternal Aunt        Social History     Socioeconomic History    Marital status: Single   Tobacco Use    Smoking status: Former Smoker     Packs/day: 0.25     Years: 0.50     Pack years: 0.12     Start date:      Quit date:      Years since quittin.2    Smokeless tobacco: Never Used    Tobacco comment: less than a year, smoked during divorce   Substance and Sexual Activity    Alcohol use: Never     Comment: Not anymore    Drug use: No    Sexual activity: Yes       Chief Complaint:   Chief Complaint   Patient presents with    Left Shoulder - Pain, Post-op Evaluation     DOS: 2021 - 3.5 months s/p Left shoulder scope with rcr. Dr. Schaefer did sx. He is still in therapy. He has a pl on 5/10. ROM is still limited.          History of present illness:    This is a 56 y.o. year old male who complains of  The patient is following up he is status post arthroscopy to the left shoulder by Dr. Schaefer this was about 3 and half months ago he still going to therapy as his pain level is 5/10 he also complains some limited range of motion patient also had surgery on his right shoulder for rotator cuff repair back in 2018 he has been complaining of pain in the right shoulder    Review of Systems:    Constitution: Denies chills, fever, and  "sweats.  HENT: Denies headaches or blurry vision.  Cardiovascular: Denies chest pain or irregular heart beat.  Respiratory: Denies cough or shortness of breath.  Gastrointestinal: Denies abdominal pain, nausea, or vomiting.  Musculoskeletal:  Denies muscle cramps.  Neurological: Denies dizziness or focal weakness.  Psychiatric/Behavioral: Normal mental status.  Hematologic/Lymphatic: Denies bleeding problem or easy bruising/bleeding.  Skin: Denies rash or suspicious lesions.    Examination:    Vital Signs:    Vitals:    03/29/22 0842   Weight: 120.2 kg (264 lb 15.9 oz)   Height: 5' 7" (1.702 m)   PainSc:   5   PainLoc: Shoulder       Body mass index is 41.5 kg/m².    This a well-developed, well nourished patient in no acute distress.    Alert and oriented x 3 and cooperative to examination.       Physical Exam:  Left shoulder- patient has active abduction to about 90° in wall he complains of pain in the posterior aspect of the shoulder I can passively abduct him to about 130-40 degrees       right shoulder- patient has abduction to about 100° actively on the shoulder on the wall he has pain on passive range of motion past this point on the wall he does exhibit some weakness in  Abduction strength    Imaging:  No x-rays       Assessment: There are no diagnoses linked to this encounter.    Plan:   The patient is status post left rotator cuff repair he is now about 3 and half months number ordered him some more therapy he is also about 2 years status post rotator cuff repair of the right shoulder and had some therapy to the right shoulder. See him back in about 6 weeks      DISCLAIMER: This note may have been dictated using voice recognition software and may contain grammatical errors.     NOTE: Consult report sent to referring provider via EPIC EMR.    "

## 2022-03-31 ENCOUNTER — CLINICAL SUPPORT (OUTPATIENT)
Dept: REHABILITATION | Facility: HOSPITAL | Age: 57
End: 2022-03-31
Attending: ORTHOPAEDIC SURGERY
Payer: COMMERCIAL

## 2022-03-31 DIAGNOSIS — R29.898 SHOULDER WEAKNESS: ICD-10-CM

## 2022-03-31 DIAGNOSIS — Z98.890 STATUS POST LEFT ROTATOR CUFF REPAIR: ICD-10-CM

## 2022-03-31 DIAGNOSIS — G89.29 CHRONIC LEFT SHOULDER PAIN: ICD-10-CM

## 2022-03-31 DIAGNOSIS — M25.612 DECREASED RANGE OF MOTION OF LEFT SHOULDER: Primary | ICD-10-CM

## 2022-03-31 DIAGNOSIS — Z98.890 STATUS POST RIGHT ROTATOR CUFF REPAIR: ICD-10-CM

## 2022-03-31 DIAGNOSIS — M25.512 CHRONIC LEFT SHOULDER PAIN: ICD-10-CM

## 2022-03-31 PROCEDURE — 97110 THERAPEUTIC EXERCISES: CPT | Mod: PN,CQ

## 2022-03-31 NOTE — PROGRESS NOTES
[facility]   Physical Therapy Treatment Note     Name: José Miguel Aguirre [facility] Number: [id number]    Therapy Diagnosis:   Encounter Diagnoses   Name Primary?    Status post left rotator cuff repair     Status post right rotator cuff repair     Chronic left shoulder pain     Decreased range of motion of left shoulder Yes    Shoulder weakness      Physician: Ezekiel Schaefer MD    Visit Date: 3/31/2022    Physician Orders: PT Eval and Treat   Medical Diagnosis from Referral: Left rotator cuff tear arthropathy   Evaluation Date: 2/16/2022  Authorization Period Expiration: 02/15/2023  Plan of Care Expiration: 04/13/2022  Progress Note Due: completed 3/10/2022  Visit # / Visits authorized: 14/16  PTA Visit #: 1     Precautions: Standard and Diabetes     PTA Visit #: 1/5     Time In: 8:00 AM  Time Out: 8:45 AM  Total Billable Time: 45 minutes    SUBJECTIVE     Pt reports: I saw the doctor and he wants me to continue to do therapy  He was compliant with home exercise program.  Response to previous treatment: A little sore  Functional change: Independent in bathing and dressing but still having trouble putting stuff in the cabinet    Pain: 4/10  Location: right shoulder      OBJECTIVE     Objective Measures updated at progress report unless specified.     Treatment     José Miguel received the treatments listed below:      therapeutic exercises to develop strength, ROM and flexibility for 35 minutes including:   UBE L4 5'/5'     Pulleys flex/scaption x 2 min each   Standing rows with red theraband 2 x 10    Standing dowel Shld IR/ER x 20   Standing dowel shoulder abduction 2# 2x10   Standing dowel shld ext 2# 2 x 10   Supine Serratus Punches 2 x 10 2#   Supine Cane Shld Flexion 2# 2 x 10 ea   Side lying ER 3 x 10     Did not perform this date.   Standing UE Tracy x 3 min (on floor)   Supine shoulder flexion AAROM with assist from therapist 2 x 10   Isometric shoulder flexion/abd/IR/ER wall 5  sec holds x 10 ea   TB ER with yellow 1 x 8; one set without resistance x 10 standing and towel at side (increase pain noted-ice pack for 10 min after)   Elbow flexion with green TB 2 x 10 in sitting (supinated forearm)   Finger walks on wall into shoulder flexion and abduction (attempted but unable) x 8 ea   Side lying shoulder abduction 2 x 10    manual therapy techniques: Joint mobilizations and PROM were applied to the: L shoulder for 10 minutes, including:   Grade II/III superior to inferior and anterior to posterior GH joint glides   PROM/stretching to L shoulder      Patient Education and Home Exercises     Home Exercises Provided and Patient Education Provided     Education provided:   - Transitioned from supine shoulder abd using dowel to standing.  Instructed pt to control movement when lowering arm from elevated position    Written Home Exercises Provided: Patient instructed to cont prior HEP. Exercises were reviewed and José Miguel was able to demonstrate them prior to the end of the session.  José Miguel demonstrated good  understanding of the education provided. See EMR under Patient Instructions for exercises provided during therapy sessions    ASSESSMENT     Good tolerance for Passive stretching.  Limited eccentric control of L shoulder elevation.      José Miguel Is progressing well towards his goals.   Pt prognosis is Good.     Pt will continue to benefit from skilled outpatient physical therapy to address the deficits listed in the problem list box on initial evaluation, provide pt/family education and to maximize pt's level of independence in the home and community environment.     Pt's spiritual, cultural and educational needs considered and pt agreeable to plan of care and goals.     Anticipated barriers to physical therapy: Diabetes    Goals:   Short Term GOALS: 1-3 weeks. Pt agrees with goals set.   1. Pt will be complaint and independent with home exercise program. Goal met 03/10/2022   2. Pt will increase PROM of  left shoulder into flexion and abduction to >/= 100 degrees. Goal met 03/28/2022   3. Pt will increase left shoulder strength via MMT to >/= 3+/5. Progressing 03/28/2022     Long Term GOALS: 4-8 weeks. Pt agrees with goals set.  1. Pt will return to cutting his grass independently. Ongoing  2. Pt will return to playing 18 holes of golf independently. Ongoing  3. Pt will utilize left shoulder to put up his dishes independently without limitation. Minimal progress  4. Pt will increase left shoulder flexion and abduction AROM to >/= 125 degrees for improved ability to perform overhead tasks. Progressing   5. Pt will lift 15# object from eye level to above head utilizing both UE's for improved lifting capacity. Minimal progress      PLAN     Joint mobilization to GH joint and AC joint, PROM/stretching, progressive strengthening of L shoulder.     Jonathan Favre, PTA

## 2022-04-05 ENCOUNTER — CLINICAL SUPPORT (OUTPATIENT)
Dept: REHABILITATION | Facility: HOSPITAL | Age: 57
End: 2022-04-05
Attending: ORTHOPAEDIC SURGERY
Payer: COMMERCIAL

## 2022-04-05 DIAGNOSIS — M25.512 CHRONIC LEFT SHOULDER PAIN: ICD-10-CM

## 2022-04-05 DIAGNOSIS — M25.612 DECREASED RANGE OF MOTION OF LEFT SHOULDER: Primary | ICD-10-CM

## 2022-04-05 DIAGNOSIS — G89.29 CHRONIC LEFT SHOULDER PAIN: ICD-10-CM

## 2022-04-05 DIAGNOSIS — R29.898 SHOULDER WEAKNESS: ICD-10-CM

## 2022-04-05 PROCEDURE — 97110 THERAPEUTIC EXERCISES: CPT | Mod: PN,CQ

## 2022-04-05 NOTE — PROGRESS NOTES
[facility]   Physical Therapy Treatment Note     Name: José Miguel Aguirre [facility] Number: [id number]    Therapy Diagnosis:   Encounter Diagnoses   Name Primary?    Chronic left shoulder pain     Decreased range of motion of left shoulder Yes    Shoulder weakness      Physician: Esau Clifton MD    Visit Date: 4/5/2022    Physician Orders: PT Eval and Treat   Medical Diagnosis from Referral: Left rotator cuff tear arthropathy   Evaluation Date: 2/16/2022  Authorization Period Expiration: 02/15/2023  Plan of Care Expiration: 04/13/2022  Progress Note Due: completed 3/10/2022  Visit # / Visits authorized: 15 / 16  PTA Visit #: 2     Precautions: Standard and Diabetes     PTA Visit #: 2/5     Time In: 10:15 AM  Time Out: 11:05  AM  Total Billable Time: 45 minutes    SUBJECTIVE     Pt reports: No new c/'s  He was compliant with home exercise program.  Response to previous treatment: A little sore  Functional change: Independent in bathing and dressing but still having trouble putting stuff in the cabinet    Pain: 4-5/10  Location: right shoulder      OBJECTIVE     Objective Measures updated at progress report unless specified.     Treatment     José Miguel received the treatments listed below:      therapeutic exercises to develop strength, ROM and flexibility for 35 minutes including:   UBE L4 5'/5'     Pulleys flex/scaption x 2 min each   Standing rows with red theraband 2 x 10    Standing dowel Shld IR/ER x 20   Standing dowel shoulder abduction 2# 2x10   Standing dowel shld ext 2# 2 x 10   Supine Serratus Punches 2 x 10 2#   Supine Cane Shld Flexion 2# 2 x 10 ea   Side lying ER 3 x 10   Standing UE Mcgregor x 3 min (on floor)   Supine shoulder flexion AAROM with assist from therapist 2 x 10   Isometric shoulder flexion/abd/IR/ER wall 5 sec holds x 10 ea   TB ER with yellow 1 x 8; one set without resistance x 10 standing and towel at side (increase pain noted-ice pack for 10 min  after)   Elbow flexion with green TB 2 x 10 in sitting (supinated forearm)   Finger walks on wall into shoulder flexion and abduction (attempted but unable) x 8 ea   Side lying shoulder abduction 2 x 10    manual therapy techniques: Joint mobilizations and PROM were applied to the: L shoulder for 10 minutes, including:   Grade II/III superior to inferior and anterior to posterior GH joint glides   PROM/stretching to L shoulder      Patient Education and Home Exercises     Home Exercises Provided and Patient Education Provided     Education provided:   - Transitioned from supine shoulder abd using dowel to standing.  Instructed pt to control movement when lowering arm from elevated position    Written Home Exercises Provided: Patient instructed to cont prior HEP. Exercises were reviewed and José Miguel was able to demonstrate them prior to the end of the session.  José Miguel demonstrated good  understanding of the education provided. See EMR under Patient Instructions for exercises provided during therapy sessions    ASSESSMENT     Good tolerance for Passive stretching.  Limited eccentric control of L shoulder elevation.      José Miguel Is progressing well towards his goals.   Pt prognosis is Good.     Pt will continue to benefit from skilled outpatient physical therapy to address the deficits listed in the problem list box on initial evaluation, provide pt/family education and to maximize pt's level of independence in the home and community environment.     Pt's spiritual, cultural and educational needs considered and pt agreeable to plan of care and goals.     Anticipated barriers to physical therapy: Diabetes    Goals:   Short Term GOALS: 1-3 weeks. Pt agrees with goals set.   1. Pt will be complaint and independent with home exercise program. Goal met 03/10/2022   2. Pt will increase PROM of left shoulder into flexion and abduction to >/= 100 degrees. Goal met 03/28/2022   3. Pt will increase left shoulder strength via MMT to >/=  3+/5. Progressing 03/28/2022     Long Term GOALS: 4-8 weeks. Pt agrees with goals set.  1. Pt will return to cutting his grass independently. Ongoing  2. Pt will return to playing 18 holes of golf independently. Ongoing  3. Pt will utilize left shoulder to put up his dishes independently without limitation. Minimal progress  4. Pt will increase left shoulder flexion and abduction AROM to >/= 125 degrees for improved ability to perform overhead tasks. Progressing   5. Pt will lift 15# object from eye level to above head utilizing both UE's for improved lifting capacity. Minimal progress      PLAN     Joint mobilization to GH joint and AC joint, PROM/stretching, progressive strengthening of L shoulder.     Jonathan Favre, PTA

## 2022-04-11 ENCOUNTER — CLINICAL SUPPORT (OUTPATIENT)
Dept: REHABILITATION | Facility: HOSPITAL | Age: 57
End: 2022-04-11
Attending: ORTHOPAEDIC SURGERY
Payer: COMMERCIAL

## 2022-04-11 DIAGNOSIS — R29.898 SHOULDER WEAKNESS: ICD-10-CM

## 2022-04-11 DIAGNOSIS — M25.512 CHRONIC LEFT SHOULDER PAIN: Primary | ICD-10-CM

## 2022-04-11 DIAGNOSIS — G89.29 CHRONIC LEFT SHOULDER PAIN: Primary | ICD-10-CM

## 2022-04-11 DIAGNOSIS — M25.612 DECREASED RANGE OF MOTION OF LEFT SHOULDER: ICD-10-CM

## 2022-04-11 NOTE — PLAN OF CARE
[facility]  Physical Therapy Plan of Care Note    Name: José Miguel Garcia  [facility] Number: [id number]    Therapy Diagnosis:   Encounter Diagnoses   Name Primary?    Chronic left shoulder pain Yes    Decreased range of motion of left shoulder     Shoulder weakness      Physician: Esau Clifton MD    Visit Date: 4/11/2022    Physician Orders: PT Eval and Treat   Medical Diagnosis from Referral: Left rotator cuff tear arthropathy   Evaluation Date: 2/16/2022  Authorization Period Expiration: 02/15/2023  Plan of Care Expiration: 05/13/2022  Progress Note Due: (4/11/2022)  Visit # / Visits authorized: 3 / 12 (16 total)  FOTO: completed 4/11/2022    Precautions: Standard and Diabetes   Functional Level Prior to Evaluation:  has his family perform shopping for him currently. Pt states he has difficulty with washing and putting up dishes and will occasionally require assistance for dressing and/or bathing. Pt is not doing his laundry. Pt is currently unable to perform elliptical bike and unable to golf.    SUBJECTIVE     Update:   Pain:  Current 4/10, worst 6/10, least 2-3/10  Current Level of Function: Patient has resumed shopping intermittently. Pt continues to have difficulty with putting up dishes (into overhead cabinet). Independent in dressing and bathing but with difficulty performing upper body bathing/dressing. Pt is doing his laundry but drags basket instead of lifting it. Pt is able to perform elliptical bike activity without using UE.  Unable to golf.  Patient reports that he has mowed grass using zero turn mower with some difficulty.     OBJECTIVE     Update:   Posture:  Shoulders level, minimal to moderate rounded shoulder and forward head posture.    Palpation: tenderness present anterior and lateral aspect of L shoulder.     Shoulder Range of Motion/strength:  Shoulder  Right   Left  Pain/Dysfunction with Movement    AROM PROM MMT AROM PROM MMT    flexion  120*  140*   4-/5  105*  130*  3-/5    extension  60*  70*  4/5  65*  75*  3+/5    abduction  120*  130*  4-/5  105*  110*  3-/5    adduction  20*  35*  4/5   15*  30*  4-/5    Internal rotation  65*  80*  4/5  70*  80*  3+/5    ER at 90° abd  80*  92*  4-/5  40*  62*  2/5    ER at 0° abd  53*  58*  4/5  15*  45*  2/5       ASSESSMENT     Update: Patient is making steady progress in PT with increased A/PROM, improved posture, and decreased pain but continues with significant strength deficit.  These remaining problems contribute to residual deficit in reaching, dressing, driving, and yard work activities.  He should benefit from continued skilled PT services to address remaining problems in order to minimal functional deficits.      Previous Short Term Goals Status:     1. Pt will be complaint and independent with home exercise program. Goal met 03/10/2022  2. Pt will increase PROM of left shoulder into flexion and abduction to >/= 100 degrees. Goal met 03/28/2022  3. Pt will increase left shoulder strength via MMT to >/= 3+/5. Progressing 03/28/2022    New Short Term Goals Status:     1. Modified:  Patient will be independent in complete HEP for ROM and strengthening  2. Met  3. Modified: improve strength of L shoulder ER to at least 3/5  4. Patient will consistently utilize L UE for driving activities.      Long Term Goal Status: continue per initial plan of care.  1. Pt will return to cutting his grass independently. Progressing  2. Pt will return to playing 18 holes of golf independently. Ongoing/not met  3. Pt will utilize left shoulder to put up his dishes independently without limitation. Minimal progress  4. Pt will increase left shoulder flexion and abduction AROM to >/= 125 degrees for improved ability to perform overhead tasks. Progressing   5. Pt will lift 15# object from eye level to above head utilizing both UE's for improved lifting capacity. Minimal progress    Reasons for Recertification of Therapy:   Patient is  making progress but continues to demonstrate limitations in ROM and strength that interfere with his ability to fully participate in his desired activities.      PLAN     Updated Certification Period: 4/11/2022 to 5/14/2022   Recommended Treatment Plan: 2 times per week for 5 weeks:  Manual Therapy, Moist Heat/ Ice, Patient Education, Therapeutic Exercise and Therapeutic Taping as needed  Other Recommendations: N/A    Tash Savage, PT    I CERTIFY THE NEED FOR THESE SERVICES FURNISHED UNDER THIS PLAN OF TREATMENT AND WHILE UNDER MY CARE  Physician's comments:      Physician's Signature: ___________________________________________________

## 2022-04-11 NOTE — PROGRESS NOTES
"[facility]   Physical Therapy Treatment Note     [facility]  Cl[facility] Number: [id number]    Therapy Diagnosis:   Encounter Diagnoses   Name Primary?    Chronic left shoulder pain Yes    Decreased range of motion of left shoulder     Shoulder weakness      Physician: Esau Clifton MD    Visit Date: 4/11/2022    Physician Orders: PT Eval and Treat   Medical Diagnosis from Referral: Left rotator cuff tear arthropathy   Evaluation Date: 2/16/2022  Authorization Period Expiration: 02/15/2023  Plan of Care Expiration: 05/13/2022   Progress Note Due: completed 5/13/2022  Visit # / Visits authorized: 3/12 (16 total)  PTA Visit #: 0     Precautions: Standard and Diabetes     PTA Visit #: 0/5     Time In: 1013  Time Out: 1103  Total Billable Time: 38 minutes    SUBJECTIVE     Pt reports: "It's like in the ball, and in the back".  He was compliant with home exercise program.  Response to previous treatment: It was okay, a little sore  Functional change: Using L arm when driving but not as much as R    Pain: 4/10  Location: L shoulder (in the ball, and in the back)    OBJECTIVE     Objective Measures updated at progress report unless specified.     Treatment     José Miguel received the treatments listed below:      therapeutic exercises to develop strength, ROM and flexibility for 31 minutes including:   UBE L4 4'/4'     Pulleys flex/scaption x 2 min each    IR behind back x 1'   Standing rows with green theraband 2 x 10     Extension with green theraband 2x10   Standing dowel Shld IR/ER x 20   Standing dowel shoulder abduction 3# 2x10    Shld IR behind back 3# 2x10    Shld ext 3# 2 x 10    Shld Flexion 3# 2 x 10 ea   Supine Serratus Punches 2 x 10 2#   Side lying ER 2 x 10 with assistance              Side lying shoulder abduction 2x10 (with tactile cues)                                      Did not perform this date.   Standing UE [place] x 3 min (on floor)   TB ER with yellow 1 x " 8; one set without resistance x 10 standing and towel at side (increase pain noted-ice pack for 10 min after)   Finger walks on wall into shoulder flexion and abduction        manual therapy techniques: Joint mobilizations and PROM were applied to the: L shoulder for 8 minutes, including:   Scapular scouring to L scapulothoracic joint in sidelying position.     PROM/stretching to L shoulder    Measurements for POC update completed.     Patient Education and Home Exercises     Home Exercises Provided and Patient Education Provided     Education provided:   - Instructed pt to perform side lying shoulder abd at home as part of his HEP.  Discussed mechanics of scapulothoracic joint     Written Home Exercises Provided: Patient instructed to cont prior HEP. Exercises were reviewed and José Miguel was able to demonstrate them prior to the end of the session.  José Miguel demonstrated good  understanding of the education provided. See EMR under Patient Instructions for exercises provided during therapy sessions    ASSESSMENT     See POC update.       José Miguel Is progressing well towards his goals.   Pt prognosis is Good.     Pt will continue to benefit from skilled outpatient physical therapy to address the deficits listed in the problem list box on initial evaluation, provide pt/family education and to maximize pt's level of independence in the home and community environment.     Pt's spiritual, cultural and educational needs considered and pt agreeable to plan of care and goals.     Anticipated barriers to physical therapy: Diabetes    Goals:   See POC update.     PLAN     See POC update.      Tash Savage, PT

## 2022-04-14 ENCOUNTER — CLINICAL SUPPORT (OUTPATIENT)
Dept: REHABILITATION | Facility: HOSPITAL | Age: 57
End: 2022-04-14
Attending: ORTHOPAEDIC SURGERY
Payer: COMMERCIAL

## 2022-04-14 DIAGNOSIS — G89.29 CHRONIC LEFT SHOULDER PAIN: ICD-10-CM

## 2022-04-14 DIAGNOSIS — R29.898 SHOULDER WEAKNESS: ICD-10-CM

## 2022-04-14 DIAGNOSIS — M25.512 CHRONIC LEFT SHOULDER PAIN: ICD-10-CM

## 2022-04-14 DIAGNOSIS — M25.612 DECREASED RANGE OF MOTION OF LEFT SHOULDER: Primary | ICD-10-CM

## 2022-04-14 PROCEDURE — 97110 THERAPEUTIC EXERCISES: CPT | Mod: PN,CQ

## 2022-04-14 NOTE — PROGRESS NOTES
[facility]   Physical Therapy Treatment Note     Name: José Miguel Aguirre [facility] Number: [id number]    Therapy Diagnosis:   Encounter Diagnoses   Name Primary?    Chronic left shoulder pain     Decreased range of motion of left shoulder Yes    Shoulder weakness      Physician: Esau Clifton MD    Visit Date: 4/14/2022    Physician Orders: PT Eval and Treat   Medical Diagnosis from Referral: Left rotator cuff tear arthropathy   Evaluation Date: 2/16/2022  Authorization Period Expiration: 02/15/2023  Plan of Care Expiration: 05/13/2022   Progress Note Due: completed 5/13/2022  Visit # / Visits authorized: 4/12 (17 total)  PTA Visit #: 0     Precautions: Standard and Diabetes     PTA Visit #: 1/5     Time In: 8:00 AM  Time Out: 8:50 AM  Total Billable Time: 40 minutes    SUBJECTIVE     Pt reports: I am ok today.  He was compliant with home exercise program.  Response to previous treatment: It was okay, a little sore  Functional change: Using L arm when driving but not as much as R    Pain: 4/10  Location: L shoulder (in the ball, and in the back)    OBJECTIVE     Objective Measures updated at progress report unless specified.     Treatment     José Miguel received the treatments listed below:      therapeutic exercises to develop strength, ROM and flexibility for 35 minutes including:   UBE L4 4'/4'     Pulleys flex/scaption x 2 min each    IR behind back x 1'   Standing rows with green theraband 2 x 10     Extension with green theraband 2x10   Standing dowel Shld IR/ER x 20   Standing dowel shoulder abduction 3# 2x10    Shld IR behind back 3# 2x10    Shld ext 3# 2 x 10    Shld Flexion 3# 2 x 10 ea   Supine Serratus Punches 2 x 10 3#   Side lying ER 2 x 10 with assistance              Side lying shoulder abduction 2x10 (with tactile cues)                                      Did not perform this date.   Standing UE [place] x 3 min (on floor)   TB ER with yellow 1 x 8; one set without  resistance x 10 standing and towel at side (increase pain noted-ice pack for 10 min after)   Finger walks on wall into shoulder flexion and abduction      DNP  manual therapy techniques: Joint mobilizations and PROM were applied to the: L shoulder for 8 minutes, including:   Scapular scouring to L scapulothoracic joint in sidelying position.     PROM/stretching to L shoulder    Measurements for POC update completed.     Patient Education and Home Exercises     Home Exercises Provided and Patient Education Provided     Education provided:   - Instructed pt to perform side lying shoulder abd at home as part of his HEP.  Discussed mechanics of scapulothoracic joint     Written Home Exercises Provided: Patient instructed to cont prior HEP. Exercises were reviewed and José Miguel was able to demonstrate them prior to the end of the session.  José Miguel demonstrated good  understanding of the education provided. See EMR under Patient Instructions for exercises provided during therapy sessions    ASSESSMENT     Patient did well with exercises; making slow, steady progress with strength and AROM.      José Miguel Is progressing well towards his goals.   Pt prognosis is Good.     Pt will continue to benefit from skilled outpatient physical therapy to address the deficits listed in the problem list box on initial evaluation, provide pt/family education and to maximize pt's level of independence in the home and community environment.     Pt's spiritual, cultural and educational needs considered and pt agreeable to plan of care and goals.     Anticipated barriers to physical therapy: Diabetes    Goals:   See POC update.     PLAN     Progress as tolerated with strengthening and ROM exercises.     Jonathan Favre, PTA

## 2022-04-18 ENCOUNTER — CLINICAL SUPPORT (OUTPATIENT)
Dept: REHABILITATION | Facility: HOSPITAL | Age: 57
End: 2022-04-18
Attending: ORTHOPAEDIC SURGERY
Payer: COMMERCIAL

## 2022-04-18 DIAGNOSIS — R29.898 SHOULDER WEAKNESS: ICD-10-CM

## 2022-04-18 DIAGNOSIS — M25.612 DECREASED RANGE OF MOTION OF LEFT SHOULDER: Primary | ICD-10-CM

## 2022-04-18 DIAGNOSIS — M25.512 LEFT SHOULDER PAIN, UNSPECIFIED CHRONICITY: ICD-10-CM

## 2022-04-18 PROCEDURE — 97110 THERAPEUTIC EXERCISES: CPT | Mod: PN

## 2022-04-18 PROCEDURE — 97140 MANUAL THERAPY 1/> REGIONS: CPT | Mod: PN

## 2022-04-18 NOTE — PROGRESS NOTES
"[facility]   Physical Therapy Treatment Note     Name: José Miguel Garcia  Clinic Number: [id number]    Therapy Diagnosis:   Encounter Diagnoses   Name Primary?    Decreased range of motion of left shoulder Yes    Left shoulder pain, unspecified chronicity     Shoulder weakness      Physician: Esau Clifton MD    Visit Date: 4/18/2022    Physician Orders: PT Eval and Treat   Medical Diagnosis from Referral: Left rotator cuff tear arthropathy   Evaluation Date: 2/16/2022  Authorization Period Expiration: 02/15/2023  Plan of Care Expiration: 05/14/2022   Progress Note Due: completed 5/14/2022  Visit # / Visits authorized: 5/12 (18 total)     Precautions: Standard and Diabetes     PTA Visit #: 0/5     Time In: 0802  Time Out: 0850  Total Billable Time: 44 minutes    SUBJECTIVE     Pt reports: "I think I slept on it wrong"  He was compliant with home exercise program.  Response to previous treatment: "It was good, a little sore"  Functional change: Decreased difficulty with UE dressing.      Pain: 3/10  Location: L shoulder (in the ball, and in the back)    OBJECTIVE     Objective Measures updated at progress report unless specified.     Treatment     José Miguel received the treatments listed below:      therapeutic exercises to develop strength, ROM and flexibility for 34 minutes including:   UBE L4 4'/4'     Pulleys flex/scaption x 2 min each    IR behind back x 1'   Standing rows with blue theraband 2 x 10     Extension with blue theraband 2x10   Isometric ER with L side step (using yellow theraband) x 15   Attempted:  ER with yellow theraband (unable to perform)   Standing dowel Shld IR/ER x 20   Standing dowel shoulder abduction 3# 2x10    Shld IR behind back 3# 2x10    Shld ext 3# 2 x 10    Shld Flexion 3# 2 x 10 ea   Supine Serratus Punches 2 x 10 3#   Side lying ER 2 x 10 with assistance focusing on eccentric control              Side lying shoulder abduction 2x10 (with tactile " ghassan)                                      Did not perform this date.   Standing UE [place] x 3 min (on floor)   Finger walks on wall into shoulder flexion and abduction     manual therapy techniques: Joint mobilizations and PROM were applied to the: L shoulder for 10 minutes, including:   Scapular scouring to L scapulothoracic joint in sidelying position.     Grade II/III joint mobs to L GH joint: anterior to posterior glide, superior to inferior glide and lateral distraction   PROM/stretching to L shoulder      Patient Education and Home Exercises     Home Exercises Provided and Patient Education Provided     Education provided:   -   Written Home Exercises Provided: Patient instructed to cont prior HEP. Exercises were reviewed and José Miguel was able to demonstrate them prior to the end of the session.  José Miguel demonstrated good  understanding of the education provided. See EMR under Patient Instructions for exercises provided during therapy sessions    ASSESSMENT     Patient progressing fairly well but continues to demonstrate significant limitation in ER strength.  Also continues to demonstrate limitation with active and passive L shoulder abduction which should improve with joint mobilization and PROM/stretching.      José Miguel Is progressing fairly well towards his goals.   Pt prognosis is Good.     Pt will continue to benefit from skilled outpatient physical therapy to address the deficits listed in the problem list box on initial evaluation, provide pt/family education and to maximize pt's level of independence in the home and community environment.     Pt's spiritual, cultural and educational needs considered and pt agreeable to plan of care and goals.     Anticipated barriers to physical therapy: Diabetes    Goals:   Short Term Goals:     1. Pt will be complaint and independent with home exercise program. Goal met 03/10/2022  2. Pt will increase PROM of left shoulder into flexion and abduction to >/= 100 degrees. Goal met  03/28/2022  3. Pt will increase left shoulder strength via MMT to >/= 3+/5.   Modified: improve strength of L shoulder ER to at least 3/5 Progressing (abduction and ER limited)  4. Patient will consistently utilize L UE for driving activities.  Progressing    Long Term Goals: continue per initial plan of care.  1. Pt will return to cutting his grass independently. Progressing  2. Pt will return to playing 18 holes of golf independently. Ongoing/not met  3. Pt will utilize left shoulder to put up his dishes independently without limitation. Minimal progress  4. Pt will increase left shoulder flexion and abduction AROM to >/= 125 degrees for improved ability to perform overhead tasks. Progressing   5. Pt will lift 15# object from eye level to above head utilizing both UE's for improved lifting capacity. Minimal progress      PLAN     Increase focus on strengthening of ER with addition of eccentric activities progressing to concentric and resisted.  Joint mobilization to facilitate improvement in shoulder abduction.      Tash Savage, PT

## 2022-04-21 ENCOUNTER — CLINICAL SUPPORT (OUTPATIENT)
Dept: REHABILITATION | Facility: HOSPITAL | Age: 57
End: 2022-04-21
Attending: ORTHOPAEDIC SURGERY
Payer: COMMERCIAL

## 2022-04-21 DIAGNOSIS — M25.612 DECREASED RANGE OF MOTION OF LEFT SHOULDER: Primary | ICD-10-CM

## 2022-04-21 DIAGNOSIS — G89.29 CHRONIC LEFT SHOULDER PAIN: ICD-10-CM

## 2022-04-21 DIAGNOSIS — R29.898 SHOULDER WEAKNESS: ICD-10-CM

## 2022-04-21 DIAGNOSIS — M25.512 CHRONIC LEFT SHOULDER PAIN: ICD-10-CM

## 2022-04-21 PROCEDURE — 97110 THERAPEUTIC EXERCISES: CPT | Mod: PN,CQ

## 2022-04-21 NOTE — PROGRESS NOTES
"[facility]   Physical Therapy Treatment Note     Name: José Miguel Aguirre [facility]  Clinic Number: [id number]    Therapy Diagnosis:   Encounter Diagnoses   Name Primary?    Chronic left shoulder pain     Decreased range of motion of left shoulder Yes    Shoulder weakness      Physician: Esau Clifton MD    Visit Date: 4/21/2022    Physician Orders: PT Eval and Treat   Medical Diagnosis from Referral: Left rotator cuff tear arthropathy   Evaluation Date: 2/16/2022  Authorization Period Expiration: 02/15/2023  Plan of Care Expiration: 05/14/2022   Progress Note Due: completed 5/14/2022  Visit # / Visits authorized: 6/12 (19 total)     Precautions: Standard and Diabetes     PTA Visit #: 1/5     Time In: 8:00 AM  Time Out: 8:40 AM  Total Billable Time: 40 minutes    SUBJECTIVE     Pt reports: No new c/o's.   He was compliant with home exercise program.  Response to previous treatment: "It was good, a little sore"  Functional change: Decreased difficulty with UE dressing.      Pain: 3/10  Location: L shoulder (in the ball, and in the back)    OBJECTIVE     Objective Measures updated at progress report unless specified.     Treatment     José Miguel received the treatments listed below:      therapeutic exercises to develop strength, ROM and flexibility for 34 minutes including:   UBE L4 5'/5'     Pulleys flex/scaption x 2 min each    IR behind back x 1'   Standing rows with blue theraband 2 x 10     Extension with blue theraband 2x10   Isometric ER with L side step (using yellow theraband) x 15   Attempted:  ER with yellow theraband (unable to perform)   Standing dowel Shld IR/ER x 20   Standing dowel shoulder abduction 3# 2x10    Shld IR behind back 3# 2x10    Shld ext 3# 2 x 10    Shld Flexion 3# 2 x 10 ea   Supine Serratus Punches 2 x 10 3#   Side lying ER 2 x 10 with assistance focusing on eccentric control              Side lying shoulder abduction 2x10 (with tactile cues)                     "                  Did not perform this date.   Standing UE [place] x 3 min (on floor)   Finger walks on wall into shoulder flexion and abduction   DNP  manual therapy techniques: Joint mobilizations and PROM were applied to the: L shoulder for 10 minutes, including:   Scapular scouring to L scapulothoracic joint in sidelying position.     Grade II/III joint mobs to L GH joint: anterior to posterior glide, superior to inferior glide and lateral distraction   PROM/stretching to L shoulder      Patient Education and Home Exercises     Home Exercises Provided and Patient Education Provided     Education provided:   -   Written Home Exercises Provided: Patient instructed to cont prior HEP. Exercises were reviewed and José Miguel was able to demonstrate them prior to the end of the session.  José Miguel demonstrated good  understanding of the education provided. See EMR under Patient Instructions for exercises provided during therapy sessions    ASSESSMENT     Patient progressing fairly well but continues to demonstrate significant limitation in ER strength.  Also continues to demonstrate limitation with active and passive L shoulder abduction which should improve with joint mobilization and PROM/stretching.      José Miguel Is progressing fairly well towards his goals.   Pt prognosis is Good.     Pt will continue to benefit from skilled outpatient physical therapy to address the deficits listed in the problem list box on initial evaluation, provide pt/family education and to maximize pt's level of independence in the home and community environment.     Pt's spiritual, cultural and educational needs considered and pt agreeable to plan of care and goals.     Anticipated barriers to physical therapy: Diabetes    Goals:   Short Term Goals:     1. Pt will be complaint and independent with home exercise program. Goal met 03/10/2022  2. Pt will increase PROM of left shoulder into flexion and abduction to >/= 100 degrees. Goal met 03/28/2022  3. Pt will  increase left shoulder strength via MMT to >/= 3+/5.   Modified: improve strength of L shoulder ER to at least 3/5 Progressing (abduction and ER limited)  4. Patient will consistently utilize L UE for driving activities.  Progressing    Long Term Goals: continue per initial plan of care.  1. Pt will return to cutting his grass independently. Progressing  2. Pt will return to playing 18 holes of golf independently. Ongoing/not met  3. Pt will utilize left shoulder to put up his dishes independently without limitation. Minimal progress  4. Pt will increase left shoulder flexion and abduction AROM to >/= 125 degrees for improved ability to perform overhead tasks. Progressing   5. Pt will lift 15# object from eye level to above head utilizing both UE's for improved lifting capacity. Minimal progress      PLAN     Increase focus on strengthening of ER with addition of eccentric activities progressing to concentric and resisted.  Joint mobilization to facilitate improvement in shoulder abduction.      Jonathan Favre, PTA

## 2022-04-25 ENCOUNTER — CLINICAL SUPPORT (OUTPATIENT)
Dept: REHABILITATION | Facility: HOSPITAL | Age: 57
End: 2022-04-25
Attending: ORTHOPAEDIC SURGERY
Payer: COMMERCIAL

## 2022-04-25 DIAGNOSIS — M25.612 DECREASED RANGE OF MOTION OF LEFT SHOULDER: Primary | ICD-10-CM

## 2022-04-25 DIAGNOSIS — R29.898 SHOULDER WEAKNESS: ICD-10-CM

## 2022-04-25 PROCEDURE — 97110 THERAPEUTIC EXERCISES: CPT | Mod: PN

## 2022-04-25 NOTE — PROGRESS NOTES
"[facility]   Physical Therapy Treatment Note     Name: José Miguel Aguirre [facility]  Clinic Number: [id number]    Therapy Diagnosis:   Encounter Diagnoses   Name Primary?    Decreased range of motion of left shoulder Yes    Shoulder weakness      Physician: Esau Clifton MD    Visit Date: 4/25/2022    Physician Orders: PT Eval and Treat   Medical Diagnosis from Referral: Left rotator cuff tear arthropathy   Evaluation Date: 2/16/2022  Authorization Period Expiration: 02/15/2023  Plan of Care Expiration: 05/14/2022   Progress Note Due: completed 5/14/2022  Visit # / Visits authorized: 7/12 (20 total)     Precautions: Standard and Diabetes     PTA Visit #: 1/5     Time In:0750  Time Out: 0830  Total Billable Time: 40 minutes    SUBJECTIVE     Pt reports: No new c/o's.   He was compliant with home exercise program.  Response to previous treatment: "It was good, a little sore"  Functional change: Decreased difficulty with UE dressing.      Pain: 3/10  Location: L shoulder (in the ball, and in the back)    OBJECTIVE     Objective Measures updated at progress report unless specified.     Treatment     José Miguel received the treatments listed below:      therapeutic exercises to develop strength, ROM and flexibility for 40 minutes including:   UBE L4 5'/5'     Pulleys flex/scaption x 2 min each    IR behind back x 1'   Standing rows with blue theraband 2 x 10     Extension with blue theraband 2x10   Isometric ER with L side step (using yellow theraband) x 15   Attempted:  ER with yellow theraband (unable to perform)   Standing dowel Shld IR/ER x 20   Standing dowel shoulder abduction 3# 2x10    Shld IR behind back 3# 2x10    Shld ext 3# 2 x 10    Shld Flexion 3# 2 x 10 ea   Supine Serratus Punches 2 x 10 3#   Side lying ER 2 x 10 with assistance focusing on eccentric control              Side lying shoulder abduction 2x10 (with tactile cues)                                      Did not perform this " date.   Standing UE [place] x 3 min (on floor)   Finger walks on wall into shoulder flexion and abduction   DNP  manual therapy techniques: Joint mobilizations and PROM were applied to the: L shoulder for 10 minutes, including:   Scapular scouring to L scapulothoracic joint in sidelying position.     Grade II/III joint mobs to L GH joint: anterior to posterior glide, superior to inferior glide and lateral distraction   PROM/stretching to L shoulder      Patient Education and Home Exercises     Home Exercises Provided and Patient Education Provided     Education provided:   -   Written Home Exercises Provided: Patient instructed to cont prior HEP. Exercises were reviewed and José Miguel was able to demonstrate them prior to the end of the session.  José Miguel demonstrated good  understanding of the education provided. See EMR under Patient Instructions for exercises provided during therapy sessions    ASSESSMENT     Patient progressing fairly well, Pt tolerated today's exercises well. Pt would continue to benefit from increasing resistance to improve overall function of his shoulder.   José Miguel Is progressing fairly well towards his goals.   Pt prognosis is Good.     Pt will continue to benefit from skilled outpatient physical therapy to address the deficits listed in the problem list box on initial evaluation, provide pt/family education and to maximize pt's level of independence in the home and community environment.     Pt's spiritual, cultural and educational needs considered and pt agreeable to plan of care and goals.     Anticipated barriers to physical therapy: Diabetes    Goals:   Short Term Goals:     1. Pt will be complaint and independent with home exercise program. Goal met 03/10/2022  2. Pt will increase PROM of left shoulder into flexion and abduction to >/= 100 degrees. Goal met 03/28/2022  3. Pt will increase left shoulder strength via MMT to >/= 3+/5.   Modified: improve strength of L shoulder ER to at least 3/5  Progressing (abduction and ER limited)  4. Patient will consistently utilize L UE for driving activities.  Progressing    Long Term Goals: continue per initial plan of care.  1. Pt will return to cutting his grass independently. Progressing  2. Pt will return to playing 18 holes of golf independently. Ongoing/not met  3. Pt will utilize left shoulder to put up his dishes independently without limitation. Minimal progress  4. Pt will increase left shoulder flexion and abduction AROM to >/= 125 degrees for improved ability to perform overhead tasks. Progressing   5. Pt will lift 15# object from eye level to above head utilizing both UE's for improved lifting capacity. Minimal progress      PLAN     Increase focus on strengthening of ER with addition of eccentric activities progressing to concentric and resisted.  Joint mobilization to facilitate improvement in shoulder abduction.      Ezekiel Montaño, PT

## 2022-04-28 ENCOUNTER — CLINICAL SUPPORT (OUTPATIENT)
Dept: REHABILITATION | Facility: HOSPITAL | Age: 57
End: 2022-04-28
Attending: ORTHOPAEDIC SURGERY
Payer: COMMERCIAL

## 2022-04-28 DIAGNOSIS — R29.898 SHOULDER WEAKNESS: ICD-10-CM

## 2022-04-28 DIAGNOSIS — M25.512 CHRONIC LEFT SHOULDER PAIN: ICD-10-CM

## 2022-04-28 DIAGNOSIS — G89.29 CHRONIC LEFT SHOULDER PAIN: ICD-10-CM

## 2022-04-28 DIAGNOSIS — M25.612 DECREASED RANGE OF MOTION OF LEFT SHOULDER: Primary | ICD-10-CM

## 2022-04-28 PROCEDURE — 97110 THERAPEUTIC EXERCISES: CPT | Mod: PN,CQ

## 2022-04-28 NOTE — PROGRESS NOTES
"[facility]   Physical Therapy Treatment Note     Name: José Miguel Aguirre [facility]  Clinic Number: [id number]    Therapy Diagnosis:   Encounter Diagnoses   Name Primary?    Chronic left shoulder pain     Decreased range of motion of left shoulder Yes    Shoulder weakness      Physician: Esau Clifton MD    Visit Date: 4/28/2022    Physician Orders: PT Eval and Treat   Medical Diagnosis from Referral: Left rotator cuff tear arthropathy   Evaluation Date: 2/16/2022  Authorization Period Expiration: 02/15/2023  Plan of Care Expiration: 05/14/2022   Progress Note Due: completed 5/14/2022  Visit # / Visits authorized: 8/12 (21 total)     Precautions: Standard and Diabetes     PTA Visit #: 1/5     Time In: 8:00 AM  Time Out: 8:40 AM  Total Billable Time: 40 minutes    SUBJECTIVE     Pt reports: No new c/o's.   He was compliant with home exercise program.  Response to previous treatment: "It was good, a little sore"  Functional change: Decreased difficulty with UE dressing.      Pain: 2/10  Location: L shoulder (in the ball, and in the back)    OBJECTIVE     Objective Measures updated at progress report unless specified.     Treatment     José Miguel received the treatments listed below:      therapeutic exercises to develop strength, ROM and flexibility for 40 minutes including:   UBE L4 5'/5'     Pulleys flex/scaption x 2 min each    IR behind back x 1'   Standing rows with blue theraband 2 x 10     Extension with blue theraband 2x10   Isometric ER with L side step (using yellow theraband) x 15   Attempted:  ER with yellow theraband (unable to perform)   Standing dowel Shld IR/ER x 20   Standing dowel shoulder abduction 3# 2x10    Shld IR behind back 3# 2x10    Shld ext 3# 2 x 10    Shld Flexion 3# 2 x 10 ea   Supine Serratus Punches 2 x 10 3#   Side lying ER 2 x 10 with assistance focusing on eccentric control              Side lying shoulder abduction 2x10 (with tactile cues)                     "                  Did not perform this date.   Standing UE [place] x 3 min (on floor)   Finger walks on wall into shoulder flexion and abduction   DNP  manual therapy techniques: Joint mobilizations and PROM were applied to the: L shoulder for 10 minutes, including:   Scapular scouring to L scapulothoracic joint in sidelying position.     Grade II/III joint mobs to L GH joint: anterior to posterior glide, superior to inferior glide and lateral distraction   PROM/stretching to L shoulder      Patient Education and Home Exercises     Home Exercises Provided and Patient Education Provided     Education provided:   -   Written Home Exercises Provided: Patient instructed to cont prior HEP. Exercises were reviewed and José Miguel was able to demonstrate them prior to the end of the session.  José Miguel demonstrated good  understanding of the education provided. See EMR under Patient Instructions for exercises provided during therapy sessions    ASSESSMENT     Patient progressing fairly well, Pt tolerated today's exercises well. Pt would continue to benefit from increasing resistance to improve overall function of his shoulder.   José Miguel Is progressing fairly well towards his goals.   Pt prognosis is Good.     Pt will continue to benefit from skilled outpatient physical therapy to address the deficits listed in the problem list box on initial evaluation, provide pt/family education and to maximize pt's level of independence in the home and community environment.     Pt's spiritual, cultural and educational needs considered and pt agreeable to plan of care and goals.     Anticipated barriers to physical therapy: Diabetes    Goals:   Short Term Goals:     1. Pt will be complaint and independent with home exercise program. Goal met 03/10/2022  2. Pt will increase PROM of left shoulder into flexion and abduction to >/= 100 degrees. Goal met 03/28/2022  3. Pt will increase left shoulder strength via MMT to >/= 3+/5.   Modified: improve strength of  L shoulder ER to at least 3/5 Progressing (abduction and ER limited)  4. Patient will consistently utilize L UE for driving activities.  Progressing    Long Term Goals: continue per initial plan of care.  1. Pt will return to cutting his grass independently. Progressing  2. Pt will return to playing 18 holes of golf independently. Ongoing/not met  3. Pt will utilize left shoulder to put up his dishes independently without limitation. Minimal progress  4. Pt will increase left shoulder flexion and abduction AROM to >/= 125 degrees for improved ability to perform overhead tasks. Progressing   5. Pt will lift 15# object from eye level to above head utilizing both UE's for improved lifting capacity. Minimal progress      PLAN     Increase focus on strengthening of ER with addition of eccentric activities progressing to concentric and resisted.  Joint mobilization to facilitate improvement in shoulder abduction.      Jonathan Favre, PTA

## 2022-05-02 ENCOUNTER — CLINICAL SUPPORT (OUTPATIENT)
Dept: REHABILITATION | Facility: HOSPITAL | Age: 57
End: 2022-05-02
Attending: ORTHOPAEDIC SURGERY
Payer: COMMERCIAL

## 2022-05-02 DIAGNOSIS — R29.898 SHOULDER WEAKNESS: ICD-10-CM

## 2022-05-02 DIAGNOSIS — M25.612 DECREASED RANGE OF MOTION OF LEFT SHOULDER: Primary | ICD-10-CM

## 2022-05-02 DIAGNOSIS — M25.512 CHRONIC LEFT SHOULDER PAIN: ICD-10-CM

## 2022-05-02 DIAGNOSIS — G89.29 CHRONIC LEFT SHOULDER PAIN: ICD-10-CM

## 2022-05-02 PROCEDURE — 97110 THERAPEUTIC EXERCISES: CPT | Mod: PN,CQ

## 2022-05-02 NOTE — PROGRESS NOTES
"[facility]   Physical Therapy Treatment Note     Name: José Miguel Aguirre [facility]  Clinic Number: [id number]    Therapy Diagnosis:   Encounter Diagnoses   Name Primary?    Chronic left shoulder pain     Decreased range of motion of left shoulder Yes    Shoulder weakness      Physician: Esau Clifton MD    Visit Date: 5/2/2022    Physician Orders: PT Eval and Treat   Medical Diagnosis from Referral: Left rotator cuff tear arthropathy   Evaluation Date: 2/16/2022  Authorization Period Expiration: 02/15/2023  Plan of Care Expiration: 05/14/2022   Progress Note Due: completed 5/14/2022  Visit # / Visits authorized: 9/12 (22 total)     Precautions: Standard and Diabetes     PTA Visit #: 2/5     Time In: 8:45 AM  Time Out: 9:25 AM  Total Billable Time: 40 minutes    SUBJECTIVE     Pt reports: I had back spasms all weekend.   He was compliant with home exercise program.  Response to previous treatment: "It was good, a little sore"  Functional change: Decreased difficulty with UE dressing.      Pain: 3/10  Location: L shoulder (in the ball, and in the back)    OBJECTIVE     Objective Measures updated at progress report unless specified.     Treatment     José Miguel received the treatments listed below:      therapeutic exercises to develop strength, ROM and flexibility for 40 minutes including:   UBE L4 5'/5'     Pulleys flex/scaption x 2 min each    IR behind back x 1'   Standing rows with blue theraband 2 x 10     Extension with blue theraband 2x10   Isometric ER with L side step (using yellow theraband) x 15   Standing dowel Shld IR/ER x 20   Standing dowel shoulder abduction 3# 2x10    Shld IR behind back 3# 2x10    Shld ext 3# 2 x 10    Shld Flexion 3# 2 x 10 ea   Supine Serratus Punches 2 x 10 3#   Side lying ER 2 x 10 with assistance focusing on eccentric control              Side lying shoulder abduction 2x10 (with tactile cues)                          Standing UE [place] x 3 min (on " floor)   Finger walks on wall into shoulder flexion and abduction   DNP  manual therapy techniques: Joint mobilizations and PROM were applied to the: L shoulder for 10 minutes, including:   Scapular scouring to L scapulothoracic joint in sidelying position.     Grade II/III joint mobs to L GH joint: anterior to posterior glide, superior to inferior glide and lateral distraction   PROM/stretching to L shoulder      Patient Education and Home Exercises     Home Exercises Provided and Patient Education Provided     Education provided:   -   Written Home Exercises Provided: Patient instructed to cont prior HEP. Exercises were reviewed and José Miguel was able to demonstrate them prior to the end of the session.  José Miguel demonstrated good  understanding of the education provided. See EMR under Patient Instructions for exercises provided during therapy sessions    ASSESSMENT     Patient progressing fairly well, Pt tolerated today's exercises well. Pt would continue to benefit from increasing resistance to improve overall function of his shoulder.   José Miguel Is progressing fairly well towards his goals.   Pt prognosis is Good.     Pt will continue to benefit from skilled outpatient physical therapy to address the deficits listed in the problem list box on initial evaluation, provide pt/family education and to maximize pt's level of independence in the home and community environment.     Pt's spiritual, cultural and educational needs considered and pt agreeable to plan of care and goals.     Anticipated barriers to physical therapy: Diabetes    Goals:   Short Term Goals:     1. Pt will be complaint and independent with home exercise program. Goal met 03/10/2022  2. Pt will increase PROM of left shoulder into flexion and abduction to >/= 100 degrees. Goal met 03/28/2022  3. Pt will increase left shoulder strength via MMT to >/= 3+/5.   Modified: improve strength of L shoulder ER to at least 3/5 Progressing (abduction and ER limited)  4.  Patient will consistently utilize L UE for driving activities.  Progressing    Long Term Goals: continue per initial plan of care.  1. Pt will return to cutting his grass independently. Progressing  2. Pt will return to playing 18 holes of golf independently. Ongoing/not met  3. Pt will utilize left shoulder to put up his dishes independently without limitation. Minimal progress  4. Pt will increase left shoulder flexion and abduction AROM to >/= 125 degrees for improved ability to perform overhead tasks. Progressing   5. Pt will lift 15# object from eye level to above head utilizing both UE's for improved lifting capacity. Minimal progress      PLAN     Increase focus on strengthening of ER with addition of eccentric activities progressing to concentric and resisted.  Joint mobilization to facilitate improvement in shoulder abduction.      Jonathan Favre, PTA

## 2022-05-05 ENCOUNTER — CLINICAL SUPPORT (OUTPATIENT)
Dept: REHABILITATION | Facility: HOSPITAL | Age: 57
End: 2022-05-05
Attending: ORTHOPAEDIC SURGERY
Payer: COMMERCIAL

## 2022-05-05 DIAGNOSIS — R29.898 SHOULDER WEAKNESS: ICD-10-CM

## 2022-05-05 DIAGNOSIS — M25.612 DECREASED RANGE OF MOTION OF LEFT SHOULDER: Primary | ICD-10-CM

## 2022-05-05 PROCEDURE — 97110 THERAPEUTIC EXERCISES: CPT | Mod: PN

## 2022-05-05 NOTE — PROGRESS NOTES
"[facility]   Physical Therapy Treatment Note     Name: José Miguel Aguirre [facility]  Clinic Number: [id number]    Therapy Diagnosis:   Encounter Diagnoses   Name Primary?    Decreased range of motion of left shoulder Yes    Shoulder weakness      Physician: Esau Clifton MD    Visit Date: 5/5/2022    Physician Orders: PT Eval and Treat   Medical Diagnosis from Referral: Left rotator cuff tear arthropathy   Evaluation Date: 2/16/2022  Authorization Period Expiration: 02/15/2023  Plan of Care Expiration: 05/14/2022   Progress Note Due: completed 5/14/2022  Visit # / Visits authorized: 10/12 (23 total)     Precautions: Standard and Diabetes     PTA Visit #: 0/5     Time In: 8:35 AM  Time Out: 0915 AM  Total Billable Time: 40 minutes    SUBJECTIVE     Pt reports: Pt says he is still having some trouble with his shoulder. Pt says the pain is a little better this morning.   He was compliant with home exercise program.  Response to previous treatment: "It was good, a little sore"  Functional change: Decreased difficulty with UE dressing.      Pain: 2/10  Location: L shoulder (in the ball, and in the back)    OBJECTIVE     Objective Measures updated at progress report unless specified.     Treatment     José Miguel received the treatments listed below:      therapeutic exercises to develop strength, ROM and flexibility for 40 minutes including:   UBE L4 5'/5'     Pulleys flex/scaption x 2 min each    IR behind back x 1'   Standing rows with blue theraband 2 x 10     Extension with blue theraband 2x10   Isometric ER with L side step (using yellow theraband) x 15   Standing dowel Shld IR/ER x 20   Standing dowel shoulder abduction 3# 2x10    Shld IR behind back 3# 2x10    Shld ext 3# 2 x 10    Shld Flexion 3# 2 x 10 ea   Supine Serratus Punches 2 x 10 4#   Side lying ER 2 x 10 with assistance focusing on eccentric control              Side lying shoulder abduction 2x10 (with tactile cues)                 "          Standing UE [place] x 3 min (on floor)   Finger walks on wall into shoulder flexion and abduction   DNP  manual therapy techniques: Joint mobilizations and PROM were applied to the: L shoulder for 10 minutes, including:   Scapular scouring to L scapulothoracic joint in sidelying position.     Grade II/III joint mobs to L GH joint: anterior to posterior glide, superior to inferior glide and lateral distraction   PROM/stretching to L shoulder      Patient Education and Home Exercises     Home Exercises Provided and Patient Education Provided     Education provided:   -   Written Home Exercises Provided: Patient instructed to cont prior HEP. Exercises were reviewed and José Miguel was able to demonstrate them prior to the end of the session.  José Miguel demonstrated good  understanding of the education provided. See EMR under Patient Instructions for exercises provided during therapy sessions    ASSESSMENT     Patient progressing fairly well, Pt tolerated today's exercises well. Pt's mobility is slowly getting better, Pt needs more overhead strength   José Miguel Is progressing fairly well towards his goals.   Pt prognosis is Good.     Pt will continue to benefit from skilled outpatient physical therapy to address the deficits listed in the problem list box on initial evaluation, provide pt/family education and to maximize pt's level of independence in the home and community environment.     Pt's spiritual, cultural and educational needs considered and pt agreeable to plan of care and goals.     Anticipated barriers to physical therapy: Diabetes    Goals:   Short Term Goals:     1. Pt will be complaint and independent with home exercise program. Goal met 03/10/2022  2. Pt will increase PROM of left shoulder into flexion and abduction to >/= 100 degrees. Goal met 03/28/2022  3. Pt will increase left shoulder strength via MMT to >/= 3+/5.   Modified: improve strength of L shoulder ER to at least 3/5 Progressing (abduction and ER  limited)  4. Patient will consistently utilize L UE for driving activities.  Progressing    Long Term Goals: continue per initial plan of care.  1. Pt will return to cutting his grass independently. Progressing  2. Pt will return to playing 18 holes of golf independently. Ongoing/not met  3. Pt will utilize left shoulder to put up his dishes independently without limitation. Minimal progress  4. Pt will increase left shoulder flexion and abduction AROM to >/= 125 degrees for improved ability to perform overhead tasks. Progressing   5. Pt will lift 15# object from eye level to above head utilizing both UE's for improved lifting capacity. Minimal progress      PLAN     Increase focus on strengthening of ER with addition of eccentric activities progressing to concentric and resisted.  Joint mobilization to facilitate improvement in shoulder abduction.      Ezekiel Montaño, PT

## 2022-05-09 ENCOUNTER — CLINICAL SUPPORT (OUTPATIENT)
Dept: REHABILITATION | Facility: HOSPITAL | Age: 57
End: 2022-05-09
Attending: ORTHOPAEDIC SURGERY
Payer: COMMERCIAL

## 2022-05-09 DIAGNOSIS — M25.612 DECREASED RANGE OF MOTION OF LEFT SHOULDER: ICD-10-CM

## 2022-05-09 DIAGNOSIS — M25.512 LEFT SHOULDER PAIN, UNSPECIFIED CHRONICITY: Primary | ICD-10-CM

## 2022-05-09 DIAGNOSIS — R29.898 SHOULDER WEAKNESS: ICD-10-CM

## 2022-05-09 PROCEDURE — 97110 THERAPEUTIC EXERCISES: CPT | Mod: PN

## 2022-05-09 NOTE — PROGRESS NOTES
"[facility]   Physical Therapy Treatment Note     Name: José Miguel Aguirre [facility]  Clinic Number: [id number]    Therapy Diagnosis:   Encounter Diagnoses   Name Primary?    Left shoulder pain, unspecified chronicity Yes    Decreased range of motion of left shoulder     Shoulder weakness      Physician: Esau Clifton MD    Visit Date: 5/9/2022    Physician Orders: PT Eval and Treat   Medical Diagnosis from Referral: Left rotator cuff tear arthropathy   Evaluation Date: 2/16/2022  Authorization Period Expiration: 02/15/2023  Plan of Care Expiration: 05/14/2022   Progress Note Due: completed 5/14/2022  Visit # / Visits authorized: 11/12 (24 total)     Precautions: Standard and Diabetes     PTA Visit #: 0/5     Time In: 8:30 AM  Time Out: 0915 AM  Total Billable Time: 45 minutes    SUBJECTIVE     Pt reports: Pt says he is having a little pain in the back of his arm. Pt says he is getting tired of dealing with his arm and tries to just keep doing his normal everyday stuff.  He was compliant with home exercise program.  Response to previous treatment: "felt alright after last time"  Functional change: fishing more lately.       Pain: 2/10  Location: L shoulder (in the ball, and in the back)    OBJECTIVE     Objective Measures updated at progress report unless specified.     Treatment     José Miguel received the treatments listed below:      therapeutic exercises to develop strength, ROM and flexibility for 40 minutes including:   UBE L4 5'/5'     Pulleys flex/scaption x 2 min each    IR behind back x 1'   Standing rows with blue theraband 2 x 10     Extension with blue theraband 2x10   Isometric ER with L side step (using yellow theraband) x 15   Standing dowel Shld IR/ER x 20   Standing dowel shoulder abduction 3# 2x10    Shld IR behind back 3# 2x10    Shld ext 3# 2 x 10    Shld Flexion 3# 2 x 10 ea   Supine Serratus Punches 2 x 10 4#   Side lying ER 2 x 10 with assistance focusing on eccentric " control   Walk SB up the wall x10              Side lying shoulder abduction 2x10 (with tactile cues)                          Standing UE [place] x 3 min (on floor)   Finger walks on wall into shoulder flexion and abduction     manual therapy techniques: Joint mobilizations and PROM were applied to the: L shoulder for 5 minutes, including:      PROM/stretching to L shoulder      Patient Education and Home Exercises     Home Exercises Provided and Patient Education Provided     Education provided:   -   Written Home Exercises Provided: Patient instructed to cont prior HEP. Exercises were reviewed and José Miguel was able to demonstrate them prior to the end of the session.  José Miguel demonstrated good  understanding of the education provided. See EMR under Patient Instructions for exercises provided during therapy sessions    ASSESSMENT     Patient progressing fairly well, Pt would benefit from additional work especially some additional manual therapy to help decrease jt stiffness and improve ROM.    José Miguel Is progressing fairly well towards his goals.   Pt prognosis is Good.     Pt will continue to benefit from skilled outpatient physical therapy to address the deficits listed in the problem list box on initial evaluation, provide pt/family education and to maximize pt's level of independence in the home and community environment.     Pt's spiritual, cultural and educational needs considered and pt agreeable to plan of care and goals.     Anticipated barriers to physical therapy: Diabetes    Goals:   Short Term Goals:     1. Pt will be complaint and independent with home exercise program. Goal met 03/10/2022  2. Pt will increase PROM of left shoulder into flexion and abduction to >/= 100 degrees. Goal met 03/28/2022  3. Pt will increase left shoulder strength via MMT to >/= 3+/5.   Modified: improve strength of L shoulder ER to at least 3/5 Progressing (abduction and ER limited)  4. Patient will consistently utilize L UE for driving  activities.  Progressing    Long Term Goals: continue per initial plan of care.  1. Pt will return to cutting his grass independently. Partialy MET (pt can mow,but not weedeat/edge)  2. Pt will return to playing 18 holes of golf independently. NOT MET  3. Pt will utilize left shoulder to put up his dishes independently without limitation. Minimal progress  4. Pt will increase left shoulder flexion and abduction AROM to >/= 125 degrees for improved ability to perform overhead tasks. MET( pt displays compensatory shoulder hike)  5. Pt will lift 15# object from eye level to above head utilizing both UE's for improved lifting capacity. Unable at this time.      PLAN     Increase focus on strengthening of ER with addition of eccentric activities progressing to concentric and resisted.  Joint mobilization to facilitate improvement in shoulder abduction.      Ezekiel Montaño, PT

## 2022-05-10 ENCOUNTER — OFFICE VISIT (OUTPATIENT)
Dept: ORTHOPEDICS | Facility: CLINIC | Age: 57
End: 2022-05-10
Payer: COMMERCIAL

## 2022-05-10 ENCOUNTER — HOSPITAL ENCOUNTER (OUTPATIENT)
Dept: RADIOLOGY | Facility: HOSPITAL | Age: 57
Discharge: HOME | End: 2022-05-10
Attending: ORTHOPAEDIC SURGERY
Payer: COMMERCIAL

## 2022-05-10 VITALS — HEIGHT: 67 IN | BODY MASS INDEX: 41.59 KG/M2 | WEIGHT: 265 LBS

## 2022-05-10 DIAGNOSIS — Z98.890 STATUS POST LEFT ROTATOR CUFF REPAIR: ICD-10-CM

## 2022-05-10 DIAGNOSIS — Z98.890 STATUS POST RIGHT ROTATOR CUFF REPAIR: ICD-10-CM

## 2022-05-10 DIAGNOSIS — G89.29 CHRONIC LOW BACK PAIN, UNSPECIFIED BACK PAIN LATERALITY, UNSPECIFIED WHETHER SCIATICA PRESENT: ICD-10-CM

## 2022-05-10 DIAGNOSIS — M25.511 RIGHT SHOULDER PAIN, UNSPECIFIED CHRONICITY: Primary | ICD-10-CM

## 2022-05-10 DIAGNOSIS — M54.50 CHRONIC LOW BACK PAIN, UNSPECIFIED BACK PAIN LATERALITY, UNSPECIFIED WHETHER SCIATICA PRESENT: ICD-10-CM

## 2022-05-10 DIAGNOSIS — M17.12 PRIMARY OSTEOARTHRITIS OF LEFT KNEE: Primary | ICD-10-CM

## 2022-05-10 DIAGNOSIS — M25.511 RIGHT SHOULDER PAIN, UNSPECIFIED CHRONICITY: ICD-10-CM

## 2022-05-10 PROCEDURE — 3051F PR MOST RECENT HEMOGLOBIN A1C LEVEL 7.0 - < 8.0%: ICD-10-PCS | Mod: CPTII,S$GLB,, | Performed by: ORTHOPAEDIC SURGERY

## 2022-05-10 PROCEDURE — 73030 X-RAY EXAM OF SHOULDER: CPT | Mod: TC,PN,RT

## 2022-05-10 PROCEDURE — 73030 X-RAY EXAM OF SHOULDER: CPT | Mod: 26,RT,, | Performed by: RADIOLOGY

## 2022-05-10 PROCEDURE — 3008F PR BODY MASS INDEX (BMI) DOCUMENTED: ICD-10-PCS | Mod: CPTII,S$GLB,, | Performed by: ORTHOPAEDIC SURGERY

## 2022-05-10 PROCEDURE — 3051F HG A1C>EQUAL 7.0%<8.0%: CPT | Mod: CPTII,S$GLB,, | Performed by: ORTHOPAEDIC SURGERY

## 2022-05-10 PROCEDURE — 1159F MED LIST DOCD IN RCRD: CPT | Mod: CPTII,S$GLB,, | Performed by: ORTHOPAEDIC SURGERY

## 2022-05-10 PROCEDURE — 3008F BODY MASS INDEX DOCD: CPT | Mod: CPTII,S$GLB,, | Performed by: ORTHOPAEDIC SURGERY

## 2022-05-10 PROCEDURE — 99213 PR OFFICE/OUTPT VISIT, EST, LEVL III, 20-29 MIN: ICD-10-PCS | Mod: S$GLB,,, | Performed by: ORTHOPAEDIC SURGERY

## 2022-05-10 PROCEDURE — 73030 XR SHOULDER COMPLETE 2 OR MORE VIEWS RIGHT: ICD-10-PCS | Mod: 26,RT,, | Performed by: RADIOLOGY

## 2022-05-10 PROCEDURE — 99999 PR PBB SHADOW E&M-EST. PATIENT-LVL IV: ICD-10-PCS | Mod: PBBFAC,,, | Performed by: ORTHOPAEDIC SURGERY

## 2022-05-10 PROCEDURE — 99999 PR PBB SHADOW E&M-EST. PATIENT-LVL IV: CPT | Mod: PBBFAC,,, | Performed by: ORTHOPAEDIC SURGERY

## 2022-05-10 PROCEDURE — 1159F PR MEDICATION LIST DOCUMENTED IN MEDICAL RECORD: ICD-10-PCS | Mod: CPTII,S$GLB,, | Performed by: ORTHOPAEDIC SURGERY

## 2022-05-10 PROCEDURE — 99213 OFFICE O/P EST LOW 20 MIN: CPT | Mod: S$GLB,,, | Performed by: ORTHOPAEDIC SURGERY

## 2022-05-10 RX ORDER — TRAMADOL HYDROCHLORIDE 50 MG/1
50 TABLET ORAL EVERY 12 HOURS PRN
Qty: 30 TABLET | Refills: 0 | Status: SHIPPED | OUTPATIENT
Start: 2022-05-10 | End: 2022-05-20

## 2022-05-10 NOTE — PROGRESS NOTES
5/10/2022    Past Medical History:   Diagnosis Date    COVID-19 02/2020    Diabetes mellitus     type 2    DVT (deep venous thrombosis) 2011    Approx. 2011, after having a bunion removed on left. pt was on warfarin for approx. 6 weeks. unsure of time    Hypertension     Sickle cell anemia trait     pt states all his life    Sleep apnea     USES CPAP    Wears glasses        Past Surgical History:   Procedure Laterality Date    ARTHROSCOPIC REPAIR OF ROTATOR CUFF OF SHOULDER Left 12/16/2021    Procedure: REPAIR, ROTATOR CUFF, ARTHROSCOPIC;  Surgeon: Ezekiel Schaefer MD;  Location: [facility] OR;  Service: Orthopedics;  Laterality: Left;    ARTHROSCOPIC TENOTOMY OF BICEPS TENDON Left 12/16/2021    Procedure: TENOTOMY, BICEPS, ARTHROSCOPIC;  Surgeon: Ezekiel Schaefer MD;  Location: [facility] OR;  Service: Orthopedics;  Laterality: Left;    ARTHROSCOPY OF SHOULDER WITH DECOMPRESSION OF SUBACROMIAL SPACE Left 12/16/2021    Procedure: ARTHROSCOPY, SHOULDER, WITH SUBACROMIAL SPACE DECOMPRESSION;  Surgeon: Ezekiel Schaefer MD;  Location: [facility] OR;  Service: Orthopedics;  Laterality: Left;  GENERAL AND BLOCK    BUNIONECTOMY Left 2006    Pt developed a blood clot in leg, after having bunion removed. Had to do coumadin for approx. 6 weeks.    BUNIONECTOMY Left 2010    complicated by dvt    COLONOSCOPY N/A 11/17/2016    Procedure: COLONOSCOPY;  Surgeon: Keegan Vo MD;  Location: [facility];  Service: Endoscopy;  Laterality: N/A;    COLONOSCOPY N/A 12/23/2020    Procedure: COLONOSCOPY;  Surgeon: Keegan Vo MD;  Location: [facility];  Service: Endoscopy;  Laterality: N/A;    EPIDURAL STEROID INJECTION  2019    lumbar    ESOPHAGOGASTRODUODENOSCOPY N/A 12/23/2020    Procedure: EGD (ESOPHAGOGASTRODUODENOSCOPY);  Surgeon: Keegan Vo MD;  Location: [facility] ENDO;  Service: Endoscopy;  Laterality: N/A;    INTRALUMINAL GASTROINTESTINAL TRACT IMAGING VIA CAPSULE N/A 12/30/2020    Procedure: IMAGING PROCEDURE, GI  TRACT, INTRALUMINAL, VIA CAPSULE;  Surgeon: Keegan Vo MD;  Location: [facility];  Service: Endoscopy;  Laterality: N/A;    JOINT REPLACEMENT      right    KNEE SURGERY Right     SHOULDER SURGERY Right     SHOULDER SURGERY Right     VASECTOMY Bilateral 2011       Current Outpatient Medications   Medication Sig    albuterol (PROVENTIL HFA) 90 mcg/actuation inhaler Inhale 2 puffs into the lungs every 6 (six) hours as needed for Wheezing. Rescue    albuterol-ipratropium (DUO-NEB) 2.5 mg-0.5 mg/3 mL nebulizer solution Take 3 mLs by nebulization every 6 (six) hours as needed for Wheezing or Shortness of Breath. Rescue    atorvastatin (LIPITOR) 20 MG tablet Take 1 tablet (20 mg total) by mouth once daily.    blood sugar diagnostic Strp 1 strip by Misc.(Non-Drug; Combo Route) route 2 (two) times daily.    ferrous sulfate (FEOSOL) 325 mg (65 mg iron) Tab tablet Take 1 tablet (325 mg total) by mouth 4 (four) times a week.    HYDROcodone-acetaminophen (NORCO) 7.5-325 mg per tablet Take 1 tablet by mouth every 24 hours as needed for Pain.    ibuprofen (ADVIL,MOTRIN) 800 MG tablet Take 1 tablet (800 mg total) by mouth 3 (three) times daily.    ibuprofen (ADVIL,MOTRIN) 800 MG tablet TAKE 1 TABLET(800 MG) BY MOUTH THREE TIMES DAILY    ketoconazole (NIZORAL) 2 % cream Apply topically 2 (two) times daily.    lancets Misc 1 each by Misc.(Non-Drug; Combo Route) route 2 (two) times daily.    lancing device (BD LANCET DEVICE) Misc 1 each by Misc.(Non-Drug; Combo Route) route 2 (two) times daily.    metFORMIN (GLUCOPHAGE) 1000 MG tablet Take 1 tablet (1,000 mg total) by mouth 2 (two) times daily with meals.    pantoprazole (PROTONIX) 40 MG tablet TAKE 1 TABLET(40 MG) BY MOUTH EVERY DAY    sucralfate (CARAFATE) 1 gram tablet Take 1 g by mouth 4 (four) times daily.    TURMERIC ORAL Take by mouth.     No current facility-administered medications for this visit.     Facility-Administered Medications Ordered in  Other Visits   Medication    diphenhydrAMINE injection 25 mg    electrolyte-S (ISOLYTE)    fentaNYL 50 mcg/mL injection 25 mcg    HYDROmorphone (PF) injection 0.2 mg    lactated ringers infusion    LIDOcaine (PF) 10 mg/ml (1%) injection 5 mg    ondansetron injection 4 mg    oxyCODONE immediate release tablet 5 mg    sodium chloride 0.9% flush 3 mL    sodium chloride 0.9% flush 3 mL       Review of patient's allergies indicates:   Allergen Reactions    Lisinopril Other (See Comments)     cough    Losartan Other (See Comments)     cough       Family History   Problem Relation Age of Onset    Heart block Mother     Kidney disease Mother     Cancer Paternal Aunt        Social History     Socioeconomic History    Marital status: Single   Tobacco Use    Smoking status: Former Smoker     Packs/day: 0.25     Years: 0.50     Pack years: 0.12     Start date:      Quit date:      Years since quittin.3    Smokeless tobacco: Never Used    Tobacco comment: less than a year, smoked during divorce   Substance and Sexual Activity    Alcohol use: Never     Comment: Not anymore    Drug use: No    Sexual activity: Yes       Chief Complaint:   Chief Complaint   Patient presents with    Right Shoulder - Pain     Chronic right shoulder pain for a while. H/o Right RCR & DCE on 2017 & Left RCR & Biceps Tendotomy on 2021 by Dr. Schaefer. Currently in PT x2 q.week for Right shoulder. C/o painful popping of right shoulder that is intermittent. PL 7/10 Rt.     Left Shoulder - Pain     Left RCR & Biceps Tendotomy on 2021 by Dr. Schaefer. Pain to posterior left shoulder. PL 5/10. Chronic left shoulder pain for a while now. Denies new injury or fall.          History of present illness:    This is a 56 y.o. year old male who complains of patient is has a history of chronic chronic right shoulder pain has a history of rotator cuff repair and distal clavicle resection 3-2017 right shoulder is  "worse than the left by Dr. Schaefer complains of popping pain intermittently pain level 7/10 patient also has a history of left rotator cuff repair and biceps tenotomy 12/16/2021    Review of Systems:    Constitution: Denies chills, fever, and sweats.  HENT: Denies headaches or blurry vision.  Cardiovascular: Denies chest pain or irregular heart beat.  Respiratory: Denies cough or shortness of breath.  Gastrointestinal: Denies abdominal pain, nausea, or vomiting.  Musculoskeletal:  Denies muscle cramps.  Neurological: Denies dizziness or focal weakness.  Psychiatric/Behavioral: Normal mental status.  Hematologic/Lymphatic: Denies bleeding problem or easy bruising/bleeding.  Skin: Denies rash or suspicious lesions.    Examination:    Vital Signs:    Vitals:    05/10/22 0845   Weight: 120.2 kg (264 lb 15.9 oz)   Height: 5' 7" (1.702 m)   PainSc:   7   PainLoc: Shoulder       Body mass index is 41.5 kg/m².    This a well-developed, well nourished patient in no acute distress.    Alert and oriented x 3 and cooperative to examination.       Physical Exam:  Right shoulder-his incisions are all healed patient has active abduction to about 160° he has some weakness and some mild crepitance on range of motion has some weakness on abduction strength        Left shoulder-patient has abduction to about 140° has some decrease in external rotation some crepitance on range of motion continues to have weakness in abduction strength      Left knee-patient has a varus alignment to the knee some crepitance on range of motion    Imaging:  X-rays show of the right shoulder distal clavicle resection a anchor in the proximal neck of the humerus from his tenodesis no other abnormality       Assessment: Primary osteoarthritis of left knee    Chronic low back pain, unspecified back pain laterality, unspecified whether sciatica present  -     Ambulatory referral/consult to Orthopedics    Status post left rotator cuff repair    Status post " right rotator cuff repair        Plan:  Patient states he is not interested in doing any surgery on either his right or left shoulder he is presently going to physical therapy .  We will continue his physical therapy for his shoulders.  Patient also has an arthritic left knee he is considering total knee replacement I will see him back in about 1 month will repeat his x-rays and consider left total knee replacement      DISCLAIMER: This note may have been dictated using voice recognition software and may contain grammatical errors.     NOTE: Consult report sent to referring provider via [employer] EMR.

## 2022-05-12 ENCOUNTER — CLINICAL SUPPORT (OUTPATIENT)
Dept: REHABILITATION | Facility: HOSPITAL | Age: 57
End: 2022-05-12
Attending: ORTHOPAEDIC SURGERY
Payer: COMMERCIAL

## 2022-05-12 DIAGNOSIS — M25.612 DECREASED RANGE OF MOTION OF LEFT SHOULDER: Primary | ICD-10-CM

## 2022-05-12 DIAGNOSIS — R29.898 SHOULDER WEAKNESS: ICD-10-CM

## 2022-05-12 DIAGNOSIS — G89.29 CHRONIC LEFT SHOULDER PAIN: ICD-10-CM

## 2022-05-12 DIAGNOSIS — M25.512 CHRONIC LEFT SHOULDER PAIN: ICD-10-CM

## 2022-05-12 PROCEDURE — 97110 THERAPEUTIC EXERCISES: CPT | Mod: PN,CQ

## 2022-05-12 NOTE — PROGRESS NOTES
"[facility]   Physical Therapy Treatment Note     Name: José Miguel Aguirre [facility]  Clinic Number: [id number]    Therapy Diagnosis:   Encounter Diagnoses   Name Primary?    Chronic left shoulder pain     Decreased range of motion of left shoulder Yes    Shoulder weakness      Physician: Esau Clifton MD    Visit Date: 5/12/2022    Physician Orders: PT Eval and Treat   Medical Diagnosis from Referral: Left rotator cuff tear arthropathy   Evaluation Date: 2/16/2022  Authorization Period Expiration: 02/15/2023  Plan of Care Expiration: 05/14/2022   Progress Note Due: completed 5/14/2022  Visit # / Visits authorized: 12/12 (24 total)     Precautions: Standard and Diabetes     PTA Visit #: 1/5     Time In: 7:55 AM  Time Out: 8:40 AM  Total Billable Time: 45 minutes    SUBJECTIVE     Pt reports: Saw ortho Tuesday; pleased with everything to this point; wants patient to continue PT for at least 3 more weeks.  He was compliant with home exercise program.  Response to previous treatment: "felt alright after last time"  Functional change: fishing more lately.       Pain: 2/10  Location: L shoulder (in the ball, and in the back)    OBJECTIVE     Objective Measures updated at progress report unless specified.     Treatment     José Miguel received the treatments listed below:      therapeutic exercises to develop strength, ROM and flexibility for 40 minutes including:   UBE L4 5'/5'     Pulleys flex/scaption x 2 min each    IR behind back x 1'   Standing rows with blue theraband 2 x 10     Extension with blue theraband 2x10   Isometric ER with L side step (using yellow theraband) x 15   Standing dowel Shld IR/ER x 20   Standing dowel shoulder abduction 3# 2x10    Shld IR behind back 3# 2x10    Shld ext 3# 2 x 10    Shld Flexion 3# 2 x 10 ea   Supine Serratus Punches 2 x 10 4#   Side lying ER 2 x 10 with assistance focusing on eccentric control   Walk SB up the wall x10              Side lying shoulder " abduction 2x10 (with tactile cues)                          Standing UE [place] x 3 min (on floor)   Finger walks on wall into shoulder flexion and abduction     manual therapy techniques: Joint mobilizations and PROM were applied to the: L shoulder for 5 minutes, including:      PROM/stretching to L shoulder      Patient Education and Home Exercises     Home Exercises Provided and Patient Education Provided     Education provided:   -   Written Home Exercises Provided: Patient instructed to cont prior HEP. Exercises were reviewed and José Miguel was able to demonstrate them prior to the end of the session.  José Miguel demonstrated good  understanding of the education provided. See EMR under Patient Instructions for exercises provided during therapy sessions    ASSESSMENT     Patient progressing fairly well, Pt would benefit from additional work especially some additional manual therapy to help decrease jt stiffness and improve ROM.    José Miguel Is progressing fairly well towards his goals.   Pt prognosis is Good.     Pt will continue to benefit from skilled outpatient physical therapy to address the deficits listed in the problem list box on initial evaluation, provide pt/family education and to maximize pt's level of independence in the home and community environment.     Pt's spiritual, cultural and educational needs considered and pt agreeable to plan of care and goals.     Anticipated barriers to physical therapy: Diabetes    Goals:   Short Term Goals:     1. Pt will be complaint and independent with home exercise program. Goal met 03/10/2022  2. Pt will increase PROM of left shoulder into flexion and abduction to >/= 100 degrees. Goal met 03/28/2022  3. Pt will increase left shoulder strength via MMT to >/= 3+/5.   Modified: improve strength of L shoulder ER to at least 3/5 Progressing (abduction and ER limited)  4. Patient will consistently utilize L UE for driving activities.  Progressing    Long Term Goals: continue per initial  plan of care.  1. Pt will return to cutting his grass independently. Partialy MET (pt can mow,but not weedeat/edge)  2. Pt will return to playing 18 holes of golf independently. NOT MET  3. Pt will utilize left shoulder to put up his dishes independently without limitation. Minimal progress  4. Pt will increase left shoulder flexion and abduction AROM to >/= 125 degrees for improved ability to perform overhead tasks. MET( pt displays compensatory shoulder hike)  5. Pt will lift 15# object from eye level to above head utilizing both UE's for improved lifting capacity. Unable at this time.      PLAN     Increase focus on strengthening of ER with addition of eccentric activities progressing to concentric and resisted.  Joint mobilization to facilitate improvement in shoulder abduction. Will update POC next visit and recommend continuation of PT for an additional 3 weeks.     Jonathan Favre, PTA

## 2022-05-16 ENCOUNTER — CLINICAL SUPPORT (OUTPATIENT)
Dept: REHABILITATION | Facility: HOSPITAL | Age: 57
End: 2022-05-16
Attending: ORTHOPAEDIC SURGERY
Payer: COMMERCIAL

## 2022-05-16 DIAGNOSIS — R29.898 SHOULDER WEAKNESS: ICD-10-CM

## 2022-05-16 DIAGNOSIS — M25.512 CHRONIC LEFT SHOULDER PAIN: ICD-10-CM

## 2022-05-16 DIAGNOSIS — M25.612 DECREASED RANGE OF MOTION OF LEFT SHOULDER: Primary | ICD-10-CM

## 2022-05-16 DIAGNOSIS — G89.29 CHRONIC LEFT SHOULDER PAIN: ICD-10-CM

## 2022-05-16 PROCEDURE — 97110 THERAPEUTIC EXERCISES: CPT | Mod: PN,CQ

## 2022-05-16 NOTE — PROGRESS NOTES
"[facility]   Physical Therapy Treatment Note     Name: José Miguel Aguirre [facility]  Clinic Number: [id number]    Therapy Diagnosis:   Encounter Diagnoses   Name Primary?    Chronic left shoulder pain     Decreased range of motion of left shoulder Yes    Shoulder weakness      Physician: Esau Clifton MD    Visit Date: 5/16/2022    Physician Orders: PT Eval and Treat   Medical Diagnosis from Referral: Left rotator cuff tear arthropathy   Evaluation Date: 2/16/2022  Authorization Period Expiration: 02/15/2023  Plan of Care Expiration: 05/14/2022   Progress Note Due: completed 5/14/2022  Visit # / Visits authorized: 13/18 (24 total)     Precautions: Standard and Diabetes     PTA Visit #: 2//5     Time In: 7:55 AM  Time Out: 8:45 AM  Total Billable Time: 45 minutes    SUBJECTIVE     Pt reports: No new c/o's.   He was compliant with home exercise program.  Response to previous treatment: "felt alright after last time"  Functional change: fishing more lately.       Pain: 2/10  Location: L shoulder (in the ball, and in the back)    OBJECTIVE     Objective Measures updated at progress report unless specified.     Treatment     José Miguel received the treatments listed below:      therapeutic exercises to develop strength, ROM and flexibility for 40 minutes including:   UBE L4 5'/5'     Pulleys flex/scaption x 2 min each    IR behind back x 1'   Standing rows with blue theraband 2 x 10     Extension with blue theraband 2x10   Isometric ER with L side step (using yellow theraband) x 15   Standing dowel Shld IR/ER x 20   Standing dowel shoulder abduction 3# 2x10    Shld IR behind back 3# 2x10    Shld ext 3# 2 x 10    Shld Flexion 3# 2 x 10 ea   Supine Serratus Punches 2 x 10 4#   Side lying ER 2 x 10 with assistance focusing on eccentric control   Walk SB up the wall x10              Side lying shoulder abduction 2x10 (with tactile cues)                          Standing UE [place] x 3 min (on " floor)   Finger walks on wall into shoulder flexion and abduction     manual therapy techniques: Joint mobilizations and PROM were applied to the: L shoulder for 5 minutes, including:      PROM/stretching to L shoulder      Patient Education and Home Exercises     Home Exercises Provided and Patient Education Provided     Education provided:   -   Written Home Exercises Provided: Patient instructed to cont prior HEP. Exercises were reviewed and José Miguel was able to demonstrate them prior to the end of the session.  José Miguel demonstrated good  understanding of the education provided. See EMR under Patient Instructions for exercises provided during therapy sessions    ASSESSMENT     Patient progressing fairly well, Pt would benefit from additional work especially some additional manual therapy to help decrease jt stiffness and improve ROM.    José Miguel Is progressing fairly well towards his goals.   Pt prognosis is Good.     Pt will continue to benefit from skilled outpatient physical therapy to address the deficits listed in the problem list box on initial evaluation, provide pt/family education and to maximize pt's level of independence in the home and community environment.     Pt's spiritual, cultural and educational needs considered and pt agreeable to plan of care and goals.     Anticipated barriers to physical therapy: Diabetes    Goals:   Short Term Goals:     1. Pt will be complaint and independent with home exercise program. Goal met 03/10/2022  2. Pt will increase PROM of left shoulder into flexion and abduction to >/= 100 degrees. Goal met 03/28/2022  3. Pt will increase left shoulder strength via MMT to >/= 3+/5.   Modified: improve strength of L shoulder ER to at least 3/5 Progressing (abduction and ER limited)  4. Patient will consistently utilize L UE for driving activities.  Progressing    Long Term Goals: continue per initial plan of care.  1. Pt will return to cutting his grass independently. Darrelly MET (pt can  mow,but not weedeat/edge)  2. Pt will return to playing 18 holes of golf independently. NOT MET  3. Pt will utilize left shoulder to put up his dishes independently without limitation. Minimal progress  4. Pt will increase left shoulder flexion and abduction AROM to >/= 125 degrees for improved ability to perform overhead tasks. MET( pt displays compensatory shoulder hike)  5. Pt will lift 15# object from eye level to above head utilizing both UE's for improved lifting capacity. Unable at this time.      PLAN     Increase focus on strengthening of ER with addition of eccentric activities progressing to concentric and resisted.  Joint mobilization to facilitate improvement in shoulder abduction. Will update POC next visit and recommend continuation of PT for an additional 3 weeks.     Jonathan Favre, PTA

## 2022-05-19 ENCOUNTER — CLINICAL SUPPORT (OUTPATIENT)
Dept: REHABILITATION | Facility: HOSPITAL | Age: 57
End: 2022-05-19
Attending: ORTHOPAEDIC SURGERY
Payer: COMMERCIAL

## 2022-05-19 DIAGNOSIS — R29.898 SHOULDER WEAKNESS: ICD-10-CM

## 2022-05-19 DIAGNOSIS — M25.512 LEFT SHOULDER PAIN, UNSPECIFIED CHRONICITY: Primary | ICD-10-CM

## 2022-05-19 DIAGNOSIS — M25.612 DECREASED RANGE OF MOTION OF LEFT SHOULDER: ICD-10-CM

## 2022-05-19 PROCEDURE — 97110 THERAPEUTIC EXERCISES: CPT | Mod: PN

## 2022-05-19 NOTE — PROGRESS NOTES
"  [facility]   Physical Therapy Treatment Note     Name: José Miguel Aguirre [facility]  Clinic Number: [id number]    Therapy Diagnosis:   Encounter Diagnoses   Name Primary?    Left shoulder pain, unspecified chronicity Yes    Decreased range of motion of left shoulder     Shoulder weakness      Physician: Esau Clifton MD    Visit Date: 5/19/2022    Physician Orders: PT Eval and Treat   Medical Diagnosis from Referral: Left rotator cuff tear arthropathy   Evaluation Date: 2/16/2022  Authorization Period Expiration: 02/15/2023  Plan of Care Expiration: 07/14/2022   Visit # / Visits authorized: 14/24      Precautions: Standard and Diabetes     PTA Visit #: 0/5     Time In: 0830 AM  Time Out: 0915 AM  Total Billable Time: 45 minutes    SUBJECTIVE     Pt reports: No new c/o's.   He was compliant with home exercise program.  Response to previous treatment: "felt alright after last time"  Functional change: fishing more lately.       Pain: 2/10  Location: L shoulder (in the ball, and in the back)    OBJECTIVE     Objective Measures updated at progress report unless specified.     Treatment     José Miguel received the treatments listed below:      therapeutic exercises to develop strength, ROM and flexibility for 40 minutes including:   UBE L4 5'/5'     Pulleys flex/scaption x 2 min each    IR behind back x 1'   Standing rows with blue theraband 2 x 10     Extension with blue theraband 2x10   Isometric ER with L side step (using Blue theraband) x 15   Standing dowel Shld IR/ER x 20   Standing dowel shoulder abduction 4# 2x10    Shld IR behind back 4# 2x10    Shld ext 4# 2 x 10    Shld Flexion 4# 2 x 10 ea   Supine Serratus Punches 2 x 10 4#   Side lying ER 2 x 10 with assistance focusing on eccentric control   Walk SB up the wall x10              Side lying shoulder abduction 2x10 (with tactile cues)                          Standing UE [place] x 3 min (on floor)   Finger walks on wall into shoulder " flexion and abduction     manual therapy techniques: Joint mobilizations and PROM were applied to the: L shoulder for 5 minutes, including:      PROM/stretching to L shoulder      Patient Education and Home Exercises     Home Exercises Provided and Patient Education Provided     Education provided:   -   Written Home Exercises Provided: Patient instructed to cont prior HEP. Exercises were reviewed and José Miguel was able to demonstrate them prior to the end of the session.  José Miguel demonstrated good  understanding of the education provided. See EMR under Patient Instructions for exercises provided during therapy sessions    ASSESSMENT     Patient progressing well. Pt's overhead mobility is starting to show some solid improvement. Pt had improved ROM and less stiffness after treatment. Pt tolerated increase in resistance well.    José Miguel Is progressing fairly well towards his goals.   Pt prognosis is Good.     Pt will continue to benefit from skilled outpatient physical therapy to address the deficits listed in the problem list box on initial evaluation, provide pt/family education and to maximize pt's level of independence in the home and community environment.     Pt's spiritual, cultural and educational needs considered and pt agreeable to plan of care and goals.     Anticipated barriers to physical therapy: Diabetes    Goals:   Short Term Goals:     1. Pt will be complaint and independent with home exercise program. Goal met 03/10/2022  2. Pt will increase PROM of left shoulder into flexion and abduction to >/= 100 degrees. Goal met 03/28/2022  3. Pt will increase left shoulder strength via MMT to >/= 3+/5.   Modified: improve strength of L shoulder ER to at least 3/5 Progressing (abduction and ER limited)  4. Patient will consistently utilize L UE for driving activities.  Progressing    Long Term Goals: continue per initial plan of care.  1. Pt will return to cutting his grass independently. Darrelly MET (pt can mow,but not  weedeat/edge)  2. Pt will return to playing 18 holes of golf independently. NOT MET  3. Pt will utilize left shoulder to put up his dishes independently without limitation. Minimal progress  4. Pt will increase left shoulder flexion and abduction AROM to >/= 125 degrees for improved ability to perform overhead tasks. MET( pt displays compensatory shoulder hike)  5. Pt will lift 15# object from eye level to above head utilizing both UE's for improved lifting capacity. Unable at this time.      PLAN     Increase focus on strengthening of ER with addition of eccentric activities progressing to concentric and resisted.  Joint mobilization to facilitate improvement in shoulder abduction. Will update POC next visit and recommend continuation of PT for an additional 3 weeks.     Ezekiel Montaño, PT

## 2022-05-26 ENCOUNTER — CLINICAL SUPPORT (OUTPATIENT)
Dept: REHABILITATION | Facility: HOSPITAL | Age: 57
End: 2022-05-26
Attending: ORTHOPAEDIC SURGERY
Payer: COMMERCIAL

## 2022-05-26 DIAGNOSIS — M25.512 CHRONIC LEFT SHOULDER PAIN: ICD-10-CM

## 2022-05-26 DIAGNOSIS — M25.612 DECREASED RANGE OF MOTION OF LEFT SHOULDER: Primary | ICD-10-CM

## 2022-05-26 DIAGNOSIS — R29.898 SHOULDER WEAKNESS: ICD-10-CM

## 2022-05-26 DIAGNOSIS — G89.29 CHRONIC LEFT SHOULDER PAIN: ICD-10-CM

## 2022-05-26 PROCEDURE — 97110 THERAPEUTIC EXERCISES: CPT | Mod: PN,CQ

## 2022-05-26 NOTE — PROGRESS NOTES
"  [facility]   Physical Therapy Treatment Note     Name: José Miguel Aguirre Located within [facility]  Clinic Number: [id number]    Therapy Diagnosis:   Encounter Diagnoses   Name Primary?    Chronic left shoulder pain     Decreased range of motion of left shoulder Yes    Shoulder weakness      Physician: Esau Clifton MD    Visit Date: 5/26/2022    Physician Orders: PT Eval and Treat   Medical Diagnosis from Referral: Left rotator cuff tear arthropathy   Evaluation Date: 2/16/2022  Authorization Period Expiration: 02/15/2023  Plan of Care Expiration: 07/14/2022   Visit # / Visits authorized: 15/24      Precautions: Standard and Diabetes     PTA Visit #: 1/5     Time In: 8:00 AM  Time Out: 8:45 AM  Total Billable Time: 45 minutes    SUBJECTIVE     Pt reports: No new c/o's.   He was compliant with home exercise program.  Response to previous treatment: "felt alright after last time"  Functional change: fishing more lately.       Pain: 1-2/10  Location: L shoulder (in the ball, and in the back)    OBJECTIVE     Objective Measures updated at progress report unless specified.     Treatment     José Miguel received the treatments listed below:      therapeutic exercises to develop strength, ROM and flexibility for 40 minutes including:   UBE L4 5'/5'     Pulleys flex/scaption x 2 min each    IR behind back x 1'   Standing rows with blue theraband 2 x 10     Extension with blue theraband 2x10   Isometric ER with L side step (using Blue theraband) x 15   Standing dowel Shld IR/ER x 20   Standing dowel shoulder abduction 4# 2x10    Shld IR behind back 4# 2x10    Shld ext 4# 2 x 10    Shld Flexion 4# 2 x 10 ea   Supine Serratus Punches 2 x 10 4#   Side lying ER 2 x 10 with assistance focusing on eccentric control   Walk SB up the wall x10              Side lying shoulder abduction 2x10 (with tactile cues)                          Standing UE [place] x 3 min (on floor)   Finger walks on wall into shoulder flexion and " abduction     manual therapy techniques: Joint mobilizations and PROM were applied to the: L shoulder for 5 minutes, including:      PROM/stretching to L shoulder      Patient Education and Home Exercises     Home Exercises Provided and Patient Education Provided     Education provided:   -   Written Home Exercises Provided: Patient instructed to cont prior HEP. Exercises were reviewed and José Miguel was able to demonstrate them prior to the end of the session.  José Miguel demonstrated good  understanding of the education provided. See EMR under Patient Instructions for exercises provided during therapy sessions    ASSESSMENT     Patient progressing well. Pt's overhead mobility is starting to show some solid improvement. Pt had improved ROM and less stiffness after treatment. Pt tolerated increase in resistance well.    José Miguel Is progressing fairly well towards his goals.   Pt prognosis is Good.     Pt will continue to benefit from skilled outpatient physical therapy to address the deficits listed in the problem list box on initial evaluation, provide pt/family education and to maximize pt's level of independence in the home and community environment.     Pt's spiritual, cultural and educational needs considered and pt agreeable to plan of care and goals.     Anticipated barriers to physical therapy: Diabetes    Goals:   Short Term Goals:     1. Pt will be complaint and independent with home exercise program. Goal met 03/10/2022  2. Pt will increase PROM of left shoulder into flexion and abduction to >/= 100 degrees. Goal met 03/28/2022  3. Pt will increase left shoulder strength via MMT to >/= 3+/5.   Modified: improve strength of L shoulder ER to at least 3/5 Progressing (abduction and ER limited)  4. Patient will consistently utilize L UE for driving activities.  Progressing    Long Term Goals: continue per initial plan of care.  1. Pt will return to cutting his grass independently. Partialy MET (pt can mow,but not  weedeat/edge)  2. Pt will return to playing 18 holes of golf independently. NOT MET  3. Pt will utilize left shoulder to put up his dishes independently without limitation. Minimal progress  4. Pt will increase left shoulder flexion and abduction AROM to >/= 125 degrees for improved ability to perform overhead tasks. MET( pt displays compensatory shoulder hike)  5. Pt will lift 15# object from eye level to above head utilizing both UE's for improved lifting capacity. Unable at this time.      PLAN     Increase focus on strengthening of ER with addition of eccentric activities progressing to concentric and resisted.  Joint mobilization to facilitate improvement in shoulder abduction. Will update POC next visit and recommend continuation of PT for an additional 3 weeks.     Jonathan Favre, PTA

## 2022-05-27 ENCOUNTER — HOSPITAL ENCOUNTER (OUTPATIENT)
Dept: RADIOLOGY | Facility: HOSPITAL | Age: 57
Discharge: HOME | End: 2022-05-27
Attending: INTERNAL MEDICINE
Payer: COMMERCIAL

## 2022-05-27 PROCEDURE — 71046 XR CHEST PA AND LATERAL: ICD-10-PCS | Mod: 26,,, | Performed by: RADIOLOGY

## 2022-05-27 PROCEDURE — 71046 X-RAY EXAM CHEST 2 VIEWS: CPT | Mod: 26,,, | Performed by: RADIOLOGY

## 2022-05-27 PROCEDURE — 71046 X-RAY EXAM CHEST 2 VIEWS: CPT | Mod: TC,FY

## 2022-05-30 ENCOUNTER — CLINICAL SUPPORT (OUTPATIENT)
Dept: REHABILITATION | Facility: HOSPITAL | Age: 57
End: 2022-05-30
Attending: ORTHOPAEDIC SURGERY
Payer: COMMERCIAL

## 2022-05-30 DIAGNOSIS — M25.512 LEFT SHOULDER PAIN, UNSPECIFIED CHRONICITY: Primary | ICD-10-CM

## 2022-05-30 DIAGNOSIS — R29.898 SHOULDER WEAKNESS: ICD-10-CM

## 2022-05-30 DIAGNOSIS — M25.612 DECREASED RANGE OF MOTION OF LEFT SHOULDER: ICD-10-CM

## 2022-05-30 PROCEDURE — 97110 THERAPEUTIC EXERCISES: CPT | Mod: PN

## 2022-05-30 NOTE — PROGRESS NOTES
"  [facility]   Physical Therapy Treatment Note     Name: José Miguel Aguirre [facility]  Clinic Number: [id number]    Therapy Diagnosis:   Encounter Diagnoses   Name Primary?    Left shoulder pain, unspecified chronicity Yes    Decreased range of motion of left shoulder     Shoulder weakness      Physician: Esau Clifton MD    Visit Date: 5/30/2022    Physician Orders: PT Eval and Treat   Medical Diagnosis from Referral: Left rotator cuff tear arthropathy   Evaluation Date: 2/16/2022  Authorization Period Expiration: 02/15/2023  Plan of Care Expiration: 07/14/2022   Visit # / Visits authorized: 16/24      Precautions: Standard and Diabetes     PTA Visit #: 0/5     Time In: 0745 AM  Time Out: 0830 AM  Total Billable Time: 45 minutes    SUBJECTIVE     Pt reports: No new c/o's.   He was compliant with home exercise program.  Response to previous treatment: "felt alright after last time"  Functional change: improved functional ER      Pain: 1/10  Location: L shoulder    OBJECTIVE     Objective Measures updated at progress report unless specified.     Treatment     José Miguel received the treatments listed below:      therapeutic exercises to develop strength, ROM and flexibility for 40 minutes including:   UBE L4 5'/5'     Pulleys flex/scaption x 2 min each    IR behind back x 1'   Standing rows with blue theraband 2 x 10     Extension with blue theraband 2x10    Isometric ER with L side step (using Blue theraband) x 15    Standing dowel Shld IR/ER x 20    Standing dowel shoulder abduction 4# 2x10     Shld IR behind back 4# 2x10     Shld ext 4# 2 x 10     Shld Flexion 4# 2 x 10 ea    Supine Serratus Punches 2 x 10 4#   Side lying ER 2 x 10 with assistance focusing on eccentric control   Walk SB up the wall x10              Side lying shoulder abduction 2x10 (with tactile cues)                          Standing UE [place] x 3 min (on floor)   Finger walks on wall into shoulder flexion and abduction "     manual therapy techniques: Joint mobilizations and PROM were applied to the: L shoulder for 5 minutes, including:      PROM/stretching to L shoulder      Patient Education and Home Exercises     Home Exercises Provided and Patient Education Provided     Education provided:   -   Written Home Exercises Provided: Patient instructed to cont prior HEP. Exercises were reviewed and José Miguel was able to demonstrate them prior to the end of the session.  José Miguel demonstrated good  understanding of the education provided. See EMR under Patient Instructions for exercises provided during therapy sessions    ASSESSMENT     Patient progressing well. Pt continues to respond well with increased ROM and strength with overhead movements.   José Miguel Is progressing fairly well towards his goals.   Pt prognosis is Good.     Pt will continue to benefit from skilled outpatient physical therapy to address the deficits listed in the problem list box on initial evaluation, provide pt/family education and to maximize pt's level of independence in the home and community environment.     Pt's spiritual, cultural and educational needs considered and pt agreeable to plan of care and goals.     Anticipated barriers to physical therapy: Diabetes    Goals:   Short Term Goals:     1. Pt will be complaint and independent with home exercise program. Goal met 03/10/2022  2. Pt will increase PROM of left shoulder into flexion and abduction to >/= 100 degrees. Goal met 03/28/2022  3. Pt will increase left shoulder strength via MMT to >/= 3+/5.   Modified: improve strength of L shoulder ER to at least 3/5 Progressing (abduction and ER limited)  4. Patient will consistently utilize L UE for driving activities.  Progressing    Long Term Goals: continue per initial plan of care.  1. Pt will return to cutting his grass independently. Partialy MET (pt can mow,but not weedeat/edge)  2. Pt will return to playing 18 holes of golf independently. NOT MET  3. Pt will utilize  left shoulder to put up his dishes independently without limitation. Minimal progress  4. Pt will increase left shoulder flexion and abduction AROM to >/= 125 degrees for improved ability to perform overhead tasks. MET( pt displays compensatory shoulder hike)  5. Pt will lift 15# object from eye level to above head utilizing both UE's for improved lifting capacity. Unable at this time.      PLAN     Increase focus on strengthening of ER with addition of eccentric activities progressing to concentric and resisted.  Joint mobilization to facilitate improvement in shoulder abduction. Will update POC next visit and recommend continuation of PT for an additional 3 weeks.     Ezekiel Montaño, PT

## 2022-06-01 ENCOUNTER — HOSPITAL ENCOUNTER (OUTPATIENT)
Dept: RADIOLOGY | Facility: HOSPITAL | Age: 57
Discharge: HOME | End: 2022-06-01
Attending: NURSE PRACTITIONER
Payer: COMMERCIAL

## 2022-06-01 DIAGNOSIS — R06.02 SHORTNESS OF BREATH: ICD-10-CM

## 2022-06-01 DIAGNOSIS — R05.9 COUGH: ICD-10-CM

## 2022-06-01 PROCEDURE — 71250 CT THORAX DX C-: CPT | Mod: TC

## 2022-06-01 PROCEDURE — 71250 CT CHEST WITHOUT CONTRAST: ICD-10-PCS | Mod: 26,,, | Performed by: RADIOLOGY

## 2022-06-01 PROCEDURE — 71250 CT THORAX DX C-: CPT | Mod: 26,,, | Performed by: RADIOLOGY

## 2022-06-02 ENCOUNTER — CLINICAL SUPPORT (OUTPATIENT)
Dept: REHABILITATION | Facility: HOSPITAL | Age: 57
End: 2022-06-02
Attending: ORTHOPAEDIC SURGERY
Payer: COMMERCIAL

## 2022-06-02 DIAGNOSIS — G89.29 CHRONIC LEFT SHOULDER PAIN: ICD-10-CM

## 2022-06-02 DIAGNOSIS — M25.612 DECREASED RANGE OF MOTION OF LEFT SHOULDER: Primary | ICD-10-CM

## 2022-06-02 DIAGNOSIS — M25.512 CHRONIC LEFT SHOULDER PAIN: ICD-10-CM

## 2022-06-02 DIAGNOSIS — R29.898 SHOULDER WEAKNESS: ICD-10-CM

## 2022-06-02 PROCEDURE — 97110 THERAPEUTIC EXERCISES: CPT | Mod: PN,CQ

## 2022-06-02 NOTE — PROGRESS NOTES
"  [facility]   Physical Therapy Treatment Note     Name: José Miguel Aguirre [facility]  Clinic Number: [id number]    Therapy Diagnosis:   Encounter Diagnoses   Name Primary?    Chronic left shoulder pain     Decreased range of motion of left shoulder Yes    Shoulder weakness      Physician: Esau Clifton MD    Visit Date: 6/2/2022    Physician Orders: PT Eval and Treat   Medical Diagnosis from Referral: Left rotator cuff tear arthropathy   Evaluation Date: 2/16/2022  Authorization Period Expiration: 02/15/2023  Plan of Care Expiration: 07/14/2022   Visit # / Visits authorized: 17/24      Precautions: Standard and Diabetes     PTA Visit #: 1/5     Time In: 7:50 AM  Time Out: 8:25 AM  Total Billable Time: 35 minutes    SUBJECTIVE     Pt reports: No new c/o's.   He was compliant with home exercise program.  Response to previous treatment: "felt alright after last time"  Functional change: improved functional ER      Pain: 1/10  Location: L shoulder    OBJECTIVE     Objective Measures updated at progress report unless specified.     Treatment     José Miguel received the treatments listed below:      therapeutic exercises to develop strength, ROM and flexibility for 35 minutes including:   UBE L4 5'/5'     Pulleys flex/scaption x 2 min each    IR behind back x 1'   Standing rows with blue theraband 2 x 10     Extension with blue theraband 2x10    Isometric ER with L side step (using Blue theraband) x 15    Standing dowel Shld IR/ER x 20 4#    Standing dowel shoulder abduction 4# 2x10     Shld IR behind back 4# 2x10     Shld ext 4# 2 x 10     Shld Flexion 4# 2 x 10 ea    Supine Serratus Punches 2 x 10 4#   Side lying ER 2 x 10 with assistance focusing on eccentric control   Walk SB up the wall x10              Side lying shoulder abduction 2x10 (with tactile cues)                          Standing UE [place] x 3 min (on floor)   Finger walks on wall into shoulder flexion and abduction     manual " therapy techniques: Joint mobilizations and PROM were applied to the: L shoulder for 5 minutes, including:      PROM/stretching to L shoulder      Patient Education and Home Exercises     Home Exercises Provided and Patient Education Provided     Education provided:   -   Written Home Exercises Provided: Patient instructed to cont prior HEP. Exercises were reviewed and José Miguel was able to demonstrate them prior to the end of the session.  José Miguel demonstrated good  understanding of the education provided. See EMR under Patient Instructions for exercises provided during therapy sessions    ASSESSMENT     Patient progressing well. Pt continues to respond well with increased ROM and strength with overhead movements.   José Miguel Is progressing fairly well towards his goals.   Pt prognosis is Good.     Pt will continue to benefit from skilled outpatient physical therapy to address the deficits listed in the problem list box on initial evaluation, provide pt/family education and to maximize pt's level of independence in the home and community environment.     Pt's spiritual, cultural and educational needs considered and pt agreeable to plan of care and goals.     Anticipated barriers to physical therapy: Diabetes    Goals:   Short Term Goals:     1. Pt will be complaint and independent with home exercise program. Goal met 03/10/2022  2. Pt will increase PROM of left shoulder into flexion and abduction to >/= 100 degrees. Goal met 03/28/2022  3. Pt will increase left shoulder strength via MMT to >/= 3+/5.   Modified: improve strength of L shoulder ER to at least 3/5 Progressing (abduction and ER limited)  4. Patient will consistently utilize L UE for driving activities.  Progressing    Long Term Goals: continue per initial plan of care.  1. Pt will return to cutting his grass independently. Partialy MET (pt can mow,but not weedeat/edge)  2. Pt will return to playing 18 holes of golf independently. NOT MET  3. Pt will utilize left  shoulder to put up his dishes independently without limitation. Minimal progress  4. Pt will increase left shoulder flexion and abduction AROM to >/= 125 degrees for improved ability to perform overhead tasks. MET( pt displays compensatory shoulder hike)  5. Pt will lift 15# object from eye level to above head utilizing both UE's for improved lifting capacity. Unable at this time.      PLAN     Increase focus on strengthening of ER with addition of eccentric activities progressing to concentric and resisted.  Joint mobilization to facilitate improvement in shoulder abduction. Will update POC next visit and recommend continuation of PT for an additional 3 weeks.     Jonathan Favre, PTA

## 2022-06-06 ENCOUNTER — CLINICAL SUPPORT (OUTPATIENT)
Dept: REHABILITATION | Facility: HOSPITAL | Age: 57
End: 2022-06-06
Attending: ORTHOPAEDIC SURGERY
Payer: COMMERCIAL

## 2022-06-06 DIAGNOSIS — M25.512 LEFT SHOULDER PAIN, UNSPECIFIED CHRONICITY: Primary | ICD-10-CM

## 2022-06-06 DIAGNOSIS — M17.12 PRIMARY OSTEOARTHRITIS OF LEFT KNEE: Primary | ICD-10-CM

## 2022-06-06 DIAGNOSIS — M25.612 DECREASED RANGE OF MOTION OF LEFT SHOULDER: ICD-10-CM

## 2022-06-06 DIAGNOSIS — R29.898 SHOULDER WEAKNESS: ICD-10-CM

## 2022-06-06 PROCEDURE — 97110 THERAPEUTIC EXERCISES: CPT | Mod: PN,CQ

## 2022-06-06 NOTE — PROGRESS NOTES
"  [facility]   Physical Therapy Treatment Note     Name: José Miguel Aguirre [facility]  Clinic Number: [id number]    Therapy Diagnosis:   Encounter Diagnoses   Name Primary?    Left shoulder pain, unspecified chronicity Yes    Decreased range of motion of left shoulder     Shoulder weakness      Physician: Esau Clifton MD    Visit Date: 6/6/2022    Physician Orders: PT Eval and Treat   Medical Diagnosis from Referral: Left rotator cuff tear arthropathy   Evaluation Date: 2/16/2022  Authorization Period Expiration: 02/15/2023  Plan of Care Expiration: 07/14/2022   Visit # / Visits authorized: 18/24      Precautions: Standard and Diabetes     PTA Visit #: 2/5     Time In: 10:25 AM  Time Out: 10:45 AM  Total Billable Time: 20 minutes    SUBJECTIVE     Pt reports: No new c/o's. (patient left early for a family emergency)  He was compliant with home exercise program.  Response to previous treatment: "felt alright after last time"  Functional change: improved functional ER      Pain: 1/10  Location: L shoulder    OBJECTIVE     Objective Measures updated at progress report unless specified.     Treatment     José Miguel received the treatments listed below:      therapeutic exercises to develop strength, ROM and flexibility for 20 minutes including:   UBE L4 5'/5'     Pulleys flex/scaption x 2 min each    IR behind back x 1'   Standing rows with blue theraband 2 x 10     Extension with blue theraband 2x10    Isometric ER with L side step (using Blue theraband) x 15    Standing dowel Shld IR/ER x 20 4#    Standing dowel shoulder abduction 4# 2x10     Shld IR behind back 4# 2x10     Shld ext 4# 2 x 10     Shld Flexion 4# 2 x 10 ea    Supine Serratus Punches 2 x 10 4#   Side lying ER 2 x 10 with assistance focusing on eccentric control   Walk SB up the wall x10              Side lying shoulder abduction 2x10 (with tactile cues)                          Standing UE [place] x 3 min (on floor)   Finger " walks on wall into shoulder flexion and abduction     manual therapy techniques: Joint mobilizations and PROM were applied to the: L shoulder for 0 minutes, including:      PROM/stretching to L shoulder      Patient Education and Home Exercises     Home Exercises Provided and Patient Education Provided     Education provided:   -   Written Home Exercises Provided: Patient instructed to cont prior HEP. Exercises were reviewed and José Miguel was able to demonstrate them prior to the end of the session.  José Miguel demonstrated good  understanding of the education provided. See EMR under Patient Instructions for exercises provided during therapy sessions    ASSESSMENT   Patient had to leave early.  His father was taken to the hospital.  Patient progressing well. Pt continues to respond well with increased ROM and strength with overhead movements.   José Miguel Is progressing fairly well towards his goals.   Pt prognosis is Good.     Pt will continue to benefit from skilled outpatient physical therapy to address the deficits listed in the problem list box on initial evaluation, provide pt/family education and to maximize pt's level of independence in the home and community environment.     Pt's spiritual, cultural and educational needs considered and pt agreeable to plan of care and goals.     Anticipated barriers to physical therapy: Diabetes    Goals:   Short Term Goals:     1. Pt will be complaint and independent with home exercise program. Goal met 03/10/2022  2. Pt will increase PROM of left shoulder into flexion and abduction to >/= 100 degrees. Goal met 03/28/2022  3. Pt will increase left shoulder strength via MMT to >/= 3+/5.   Modified: improve strength of L shoulder ER to at least 3/5 Progressing (abduction and ER limited)  4. Patient will consistently utilize L UE for driving activities.  Progressing    Long Term Goals: continue per initial plan of care.  1. Pt will return to cutting his grass independently. Partialy MET (pt can  mow,but not weedeat/edge)  2. Pt will return to playing 18 holes of golf independently. NOT MET  3. Pt will utilize left shoulder to put up his dishes independently without limitation. Minimal progress  4. Pt will increase left shoulder flexion and abduction AROM to >/= 125 degrees for improved ability to perform overhead tasks. MET( pt displays compensatory shoulder hike)  5. Pt will lift 15# object from eye level to above head utilizing both UE's for improved lifting capacity. Unable at this time.      PLAN     Increase focus on strengthening of ER with addition of eccentric activities progressing to concentric and resisted.  Joint mobilization to facilitate improvement in shoulder abduction. Will update POC next visit and recommend continuation of PT for an additional 3 weeks.     Eros Sher, PTA

## 2022-06-07 ENCOUNTER — OFFICE VISIT (OUTPATIENT)
Dept: ORTHOPEDICS | Facility: CLINIC | Age: 57
End: 2022-06-07
Payer: COMMERCIAL

## 2022-06-07 ENCOUNTER — HOSPITAL ENCOUNTER (OUTPATIENT)
Dept: RADIOLOGY | Facility: HOSPITAL | Age: 57
Discharge: HOME | End: 2022-06-07
Attending: ORTHOPAEDIC SURGERY
Payer: COMMERCIAL

## 2022-06-07 VITALS — WEIGHT: 264 LBS | HEIGHT: 67 IN | BODY MASS INDEX: 41.44 KG/M2

## 2022-06-07 DIAGNOSIS — M17.12 PRIMARY OSTEOARTHRITIS OF LEFT KNEE: ICD-10-CM

## 2022-06-07 DIAGNOSIS — M17.12 PRIMARY OSTEOARTHRITIS OF LEFT KNEE: Primary | ICD-10-CM

## 2022-06-07 PROCEDURE — 3008F BODY MASS INDEX DOCD: CPT | Mod: CPTII,S$GLB,, | Performed by: ORTHOPAEDIC SURGERY

## 2022-06-07 PROCEDURE — 73560 X-RAY EXAM OF KNEE 1 OR 2: CPT | Mod: 26,LT,, | Performed by: RADIOLOGY

## 2022-06-07 PROCEDURE — 1159F PR MEDICATION LIST DOCUMENTED IN MEDICAL RECORD: ICD-10-PCS | Mod: CPTII,S$GLB,, | Performed by: ORTHOPAEDIC SURGERY

## 2022-06-07 PROCEDURE — 1159F MED LIST DOCD IN RCRD: CPT | Mod: CPTII,S$GLB,, | Performed by: ORTHOPAEDIC SURGERY

## 2022-06-07 PROCEDURE — 99999 PR PBB SHADOW E&M-EST. PATIENT-LVL III: CPT | Mod: PBBFAC,,, | Performed by: ORTHOPAEDIC SURGERY

## 2022-06-07 PROCEDURE — 99999 PR PBB SHADOW E&M-EST. PATIENT-LVL III: ICD-10-PCS | Mod: PBBFAC,,, | Performed by: ORTHOPAEDIC SURGERY

## 2022-06-07 PROCEDURE — 3051F PR MOST RECENT HEMOGLOBIN A1C LEVEL 7.0 - < 8.0%: ICD-10-PCS | Mod: CPTII,S$GLB,, | Performed by: ORTHOPAEDIC SURGERY

## 2022-06-07 PROCEDURE — 73560 XR KNEE AP STANDING WITH LEFT LATERAL: ICD-10-PCS | Mod: 26,LT,, | Performed by: RADIOLOGY

## 2022-06-07 PROCEDURE — 3008F PR BODY MASS INDEX (BMI) DOCUMENTED: ICD-10-PCS | Mod: CPTII,S$GLB,, | Performed by: ORTHOPAEDIC SURGERY

## 2022-06-07 PROCEDURE — 99213 PR OFFICE/OUTPT VISIT, EST, LEVL III, 20-29 MIN: ICD-10-PCS | Mod: 25,S$GLB,, | Performed by: ORTHOPAEDIC SURGERY

## 2022-06-07 PROCEDURE — 20610 DRAIN/INJ JOINT/BURSA W/O US: CPT | Mod: LT,S$GLB,, | Performed by: ORTHOPAEDIC SURGERY

## 2022-06-07 PROCEDURE — 20610 LARGE JOINT ASPIRATION/INJECTION: L KNEE: ICD-10-PCS | Mod: LT,S$GLB,, | Performed by: ORTHOPAEDIC SURGERY

## 2022-06-07 PROCEDURE — 73560 X-RAY EXAM OF KNEE 1 OR 2: CPT | Mod: TC,PN,LT

## 2022-06-07 PROCEDURE — 3051F HG A1C>EQUAL 7.0%<8.0%: CPT | Mod: CPTII,S$GLB,, | Performed by: ORTHOPAEDIC SURGERY

## 2022-06-07 PROCEDURE — 99213 OFFICE O/P EST LOW 20 MIN: CPT | Mod: 25,S$GLB,, | Performed by: ORTHOPAEDIC SURGERY

## 2022-06-07 RX ORDER — TRIAMCINOLONE ACETONIDE 40 MG/ML
60 INJECTION, SUSPENSION INTRA-ARTICULAR; INTRAMUSCULAR
Status: DISCONTINUED | OUTPATIENT
Start: 2022-06-07 | End: 2022-06-07 | Stop reason: HOSPADM

## 2022-06-07 RX ORDER — CYCLOBENZAPRINE HCL 10 MG
TABLET ORAL
COMMUNITY
Start: 2022-05-26

## 2022-06-07 RX ORDER — HYDROCODONE BITARTRATE AND ACETAMINOPHEN 10; 325 MG/1; MG/1
TABLET ORAL
COMMUNITY
Start: 2022-05-26 | End: 2023-10-27

## 2022-06-07 RX ORDER — MELOXICAM 15 MG/1
15 TABLET ORAL DAILY PRN
COMMUNITY
Start: 2022-05-26

## 2022-06-07 RX ORDER — METHYLPREDNISOLONE 4 MG/1
TABLET ORAL
COMMUNITY
Start: 2022-05-26 | End: 2022-07-06

## 2022-06-07 RX ADMIN — TRIAMCINOLONE ACETONIDE 60 MG: 40 INJECTION, SUSPENSION INTRA-ARTICULAR; INTRAMUSCULAR at 09:06

## 2022-06-07 NOTE — PROCEDURES
Large Joint Aspiration/Injection: L knee    Date/Time: 6/7/2022 9:00 AM  Performed by: Esau Clifton MD  Authorized by: Esau Clifton MD     Indications:  Arthritis and pain  Local anesthetic:  Lidocaine 1% without epinephrine    Details:  Needle Size:  20 G  Approach:  Lateral  Location:  Knee  Site:  L knee  Medications:  60 mg triamcinolone acetonide 40 mg/mL

## 2022-06-07 NOTE — PROGRESS NOTES
6/7/2022    Past Medical History:   Diagnosis Date    COVID-19 02/2020    Diabetes mellitus     type 2    DVT (deep venous thrombosis) 2011    Approx. 2011, after having a bunion removed on left. pt was on warfarin for approx. 6 weeks. unsure of time    Hypertension     Sickle cell anemia trait     pt states all his life    Sleep apnea     USES CPAP    Wears glasses        Past Surgical History:   Procedure Laterality Date    ARTHROSCOPIC REPAIR OF ROTATOR CUFF OF SHOULDER Left 12/16/2021    Procedure: REPAIR, ROTATOR CUFF, ARTHROSCOPIC;  Surgeon: Ezekiel Schaefer MD;  Location: [facility] OR;  Service: Orthopedics;  Laterality: Left;    ARTHROSCOPIC TENOTOMY OF BICEPS TENDON Left 12/16/2021    Procedure: TENOTOMY, BICEPS, ARTHROSCOPIC;  Surgeon: Ezekiel Schaefer MD;  Location: [facility] OR;  Service: Orthopedics;  Laterality: Left;    ARTHROSCOPY OF SHOULDER WITH DECOMPRESSION OF SUBACROMIAL SPACE Left 12/16/2021    Procedure: ARTHROSCOPY, SHOULDER, WITH SUBACROMIAL SPACE DECOMPRESSION;  Surgeon: Ezekiel Schaefer MD;  Location: [facility] OR;  Service: Orthopedics;  Laterality: Left;  GENERAL AND BLOCK    BUNIONECTOMY Left 2006    Pt developed a blood clot in leg, after having bunion removed. Had to do coumadin for approx. 6 weeks.    BUNIONECTOMY Left 2010    complicated by dvt    COLONOSCOPY N/A 11/17/2016    Procedure: COLONOSCOPY;  Surgeon: Keegan Vo MD;  Location: [facility];  Service: Endoscopy;  Laterality: N/A;    COLONOSCOPY N/A 12/23/2020    Procedure: COLONOSCOPY;  Surgeon: Keegan Vo MD;  Location: [facility];  Service: Endoscopy;  Laterality: N/A;    EPIDURAL STEROID INJECTION  2019    lumbar    ESOPHAGOGASTRODUODENOSCOPY N/A 12/23/2020    Procedure: EGD (ESOPHAGOGASTRODUODENOSCOPY);  Surgeon: Keegan Vo MD;  Location: [facility] ENDO;  Service: Endoscopy;  Laterality: N/A;    INTRALUMINAL GASTROINTESTINAL TRACT IMAGING VIA CAPSULE N/A 12/30/2020    Procedure: IMAGING PROCEDURE, GI  TRACT, INTRALUMINAL, VIA CAPSULE;  Surgeon: Keegan Vo MD;  Location: [facility];  Service: Endoscopy;  Laterality: N/A;    JOINT REPLACEMENT      right    KNEE SURGERY Right     SHOULDER SURGERY Right     SHOULDER SURGERY Right     VASECTOMY Bilateral 2011       Current Outpatient Medications   Medication Sig    albuterol (PROVENTIL HFA) 90 mcg/actuation inhaler Inhale 2 puffs into the lungs every 6 (six) hours as needed for Wheezing. Rescue    albuterol-ipratropium (DUO-NEB) 2.5 mg-0.5 mg/3 mL nebulizer solution Take 3 mLs by nebulization every 6 (six) hours as needed for Wheezing or Shortness of Breath. Rescue    atorvastatin (LIPITOR) 20 MG tablet Take 1 tablet (20 mg total) by mouth once daily.    blood sugar diagnostic Strp 1 strip by Misc.(Non-Drug; Combo Route) route 2 (two) times daily.    cyclobenzaprine (FLEXERIL) 10 MG tablet TAKE 1 TABLET BY MOUTH TWICE DAILY AS NEEDED FOR SPASMS    ferrous sulfate (FEOSOL) 325 mg (65 mg iron) Tab tablet Take 1 tablet (325 mg total) by mouth 4 (four) times a week.    HYDROcodone-acetaminophen (NORCO)  mg per tablet TAKE 1 TABLET BY MOUTH EVERY 8 HOURS FOR 10 DAYS    ibuprofen (ADVIL,MOTRIN) 800 MG tablet Take 1 tablet (800 mg total) by mouth 3 (three) times daily.    ketoconazole (NIZORAL) 2 % cream Apply topically 2 (two) times daily.    lancets Misc 1 each by Misc.(Non-Drug; Combo Route) route 2 (two) times daily.    lancing device (BD LANCET DEVICE) Misc 1 each by Misc.(Non-Drug; Combo Route) route 2 (two) times daily.    meloxicam (MOBIC) 15 MG tablet Take 15 mg by mouth daily as needed.    metFORMIN (GLUCOPHAGE) 1000 MG tablet Take 1 tablet (1,000 mg total) by mouth 2 (two) times daily with meals.    methylPREDNISolone (MEDROL DOSEPACK) 4 mg tablet FOLLOW PACKAGE DIRECTIONS    pantoprazole (PROTONIX) 40 MG tablet TAKE 1 TABLET(40 MG) BY MOUTH EVERY DAY    semaglutide (RYBELSUS) 7 mg tablet Take 1 tablet (7 mg total) by mouth once  daily.    sucralfate (CARAFATE) 1 gram tablet Take 1 g by mouth 4 (four) times daily.    TURMERIC ORAL Take by mouth.     No current facility-administered medications for this visit.       Review of patient's allergies indicates:   Allergen Reactions    Lisinopril Other (See Comments)     cough    Losartan Other (See Comments)     cough       Family History   Problem Relation Age of Onset    Heart block Mother     Kidney disease Mother     Cancer Paternal Aunt        Social History     Socioeconomic History    Marital status: Single   Tobacco Use    Smoking status: Former Smoker     Packs/day: 0.25     Years: 0.50     Pack years: 0.12     Start date:      Quit date:      Years since quittin.4    Smokeless tobacco: Never Used    Tobacco comment: less than a year, smoked during divorce   Substance and Sexual Activity    Alcohol use: Never     Comment: Not anymore    Drug use: No    Sexual activity: Yes       Chief Complaint:   Chief Complaint   Patient presents with    Left Knee - Pain, Follow-up     Primary OA of Lt knee. PL 3/10 in left knee today. Recently treated with Medrol Dose Guy for slipped disc in spine, but states the Dose Guy has offered him some relief for his knee pain. However, the knee pain is still bothering him overall.           History of present illness:    This is a 56 y.o. year old male who complains of patient is being seen today in for follow-up of his left knee pain has osteoarthritis of the left knee his pain level is 3/10 patient was also and spine pain IR.  Patient states the Medrol Dosepak is helping his knee pain    Review of Systems:    Constitution: Denies chills, fever, and sweats.  HENT: Denies headaches or blurry vision.  Cardiovascular: Denies chest pain or irregular heart beat.  Respiratory: Denies cough or shortness of breath.  Gastrointestinal: Denies abdominal pain, nausea, or vomiting.  Musculoskeletal:  Denies muscle cramps.  Neurological: Denies  "dizziness or focal weakness.  Psychiatric/Behavioral: Normal mental status.  Hematologic/Lymphatic: Denies bleeding problem or easy bruising/bleeding.  Skin: Denies rash or suspicious lesions.    Examination:    Vital Signs:    Vitals:    06/07/22 0904   Weight: 119.7 kg (264 lb)   Height: 5' 7" (1.702 m)       Body mass index is 41.35 kg/m².    This a well-developed, well nourished patient in no acute distress.    Alert and oriented x 3 and cooperative to examination.       Physical Exam:  Left knee-patient has a very slight effusion to the left knee he has a varus alignment he does medial joint line pain on range of motion    Imaging:  X-ray of the left knee shows moderate to severe arthritic changes in the medial compartment also arthritic changes in the patellofemoral compartment       Assessment: Primary osteoarthritis of left knee        Plan:  The patient is being seen by Dr. López for his lower back I am going to go ahead and inject his knee with some Kenalog today the patient is considering a possible total knee replacement of his knee pain increases I do not think he is ready for knee replacement at this time and a see him back in 6 weeks      DISCLAIMER: This note may have been dictated using voice recognition software and may contain grammatical errors.     NOTE: Consult report sent to referring provider via [employer] EMR.    "

## 2022-06-09 ENCOUNTER — TELEPHONE (OUTPATIENT)
Dept: PULMONOLOGY | Facility: CLINIC | Age: 57
End: 2022-06-09
Payer: COMMERCIAL

## 2022-06-09 NOTE — TELEPHONE ENCOUNTER
Pulmonology referral scheduled with patient: 7/13/2022 1020 , [facility].

## 2022-07-11 ENCOUNTER — TELEPHONE (OUTPATIENT)
Dept: VASCULAR SURGERY | Facility: CLINIC | Age: 57
End: 2022-07-11
Payer: COMMERCIAL

## 2022-07-11 NOTE — TELEPHONE ENCOUNTER
----- Message from Lorrie Mcgraw sent at 7/11/2022 10:00 AM CDT -----  Regarding: Referral in [facility]  Good morning,     Odette Dee NP referring patient to Dr. Aaron for thoracic aortic aneurysm without rupture. Referral in [facility]. Can you please assist patient with scheduling?    Thank you,     Lorrie Johnson

## 2022-08-02 ENCOUNTER — OFFICE VISIT (OUTPATIENT)
Dept: ORTHOPEDICS | Facility: CLINIC | Age: 57
End: 2022-08-02
Payer: COMMERCIAL

## 2022-08-02 DIAGNOSIS — M17.12 PRIMARY OSTEOARTHRITIS OF LEFT KNEE: Primary | ICD-10-CM

## 2022-08-02 PROCEDURE — 1159F MED LIST DOCD IN RCRD: CPT | Mod: CPTII,S$GLB,, | Performed by: ORTHOPAEDIC SURGERY

## 2022-08-02 PROCEDURE — 99212 PR OFFICE/OUTPT VISIT, EST, LEVL II, 10-19 MIN: ICD-10-PCS | Mod: S$GLB,,, | Performed by: ORTHOPAEDIC SURGERY

## 2022-08-02 PROCEDURE — 99999 PR PBB SHADOW E&M-EST. PATIENT-LVL III: ICD-10-PCS | Mod: PBBFAC,,, | Performed by: ORTHOPAEDIC SURGERY

## 2022-08-02 PROCEDURE — 99212 OFFICE O/P EST SF 10 MIN: CPT | Mod: S$GLB,,, | Performed by: ORTHOPAEDIC SURGERY

## 2022-08-02 PROCEDURE — 99999 PR PBB SHADOW E&M-EST. PATIENT-LVL III: CPT | Mod: PBBFAC,,, | Performed by: ORTHOPAEDIC SURGERY

## 2022-08-02 PROCEDURE — 3051F PR MOST RECENT HEMOGLOBIN A1C LEVEL 7.0 - < 8.0%: ICD-10-PCS | Mod: CPTII,S$GLB,, | Performed by: ORTHOPAEDIC SURGERY

## 2022-08-02 PROCEDURE — 3051F HG A1C>EQUAL 7.0%<8.0%: CPT | Mod: CPTII,S$GLB,, | Performed by: ORTHOPAEDIC SURGERY

## 2022-08-02 PROCEDURE — 1159F PR MEDICATION LIST DOCUMENTED IN MEDICAL RECORD: ICD-10-PCS | Mod: CPTII,S$GLB,, | Performed by: ORTHOPAEDIC SURGERY

## 2022-08-02 NOTE — PROGRESS NOTES
8/2/2022    Past Medical History:   Diagnosis Date    COVID-19 02/2020    Diabetes mellitus     type 2    DVT (deep venous thrombosis) 2011    Approx. 2011, after having a bunion removed on left. pt was on warfarin for approx. 6 weeks. unsure of time    Hypertension     Sickle cell anemia trait     pt states all his life    Sleep apnea     USES CPAP    Wears glasses        Past Surgical History:   Procedure Laterality Date    ARTHROSCOPIC REPAIR OF ROTATOR CUFF OF SHOULDER Left 12/16/2021    Procedure: REPAIR, ROTATOR CUFF, ARTHROSCOPIC;  Surgeon: Ezekiel Schaefer MD;  Location: [facility] OR;  Service: Orthopedics;  Laterality: Left;    ARTHROSCOPIC TENOTOMY OF BICEPS TENDON Left 12/16/2021    Procedure: TENOTOMY, BICEPS, ARTHROSCOPIC;  Surgeon: Ezekiel Schaefer MD;  Location: [facility] OR;  Service: Orthopedics;  Laterality: Left;    ARTHROSCOPY OF SHOULDER WITH DECOMPRESSION OF SUBACROMIAL SPACE Left 12/16/2021    Procedure: ARTHROSCOPY, SHOULDER, WITH SUBACROMIAL SPACE DECOMPRESSION;  Surgeon: Ezekiel Schaefer MD;  Location: [facility] OR;  Service: Orthopedics;  Laterality: Left;  GENERAL AND BLOCK    BUNIONECTOMY Left 2006    Pt developed a blood clot in leg, after having bunion removed. Had to do coumadin for approx. 6 weeks.    BUNIONECTOMY Left 2010    complicated by dvt    COLONOSCOPY N/A 11/17/2016    Procedure: COLONOSCOPY;  Surgeon: Keegan Vo MD;  Location: [facility];  Service: Endoscopy;  Laterality: N/A;    COLONOSCOPY N/A 12/23/2020    Procedure: COLONOSCOPY;  Surgeon: Keegan Vo MD;  Location: [facility];  Service: Endoscopy;  Laterality: N/A;    EPIDURAL STEROID INJECTION  2019    lumbar    ESOPHAGOGASTRODUODENOSCOPY N/A 12/23/2020    Procedure: EGD (ESOPHAGOGASTRODUODENOSCOPY);  Surgeon: Keegan Vo MD;  Location: [facility] ENDO;  Service: Endoscopy;  Laterality: N/A;    INTRALUMINAL GASTROINTESTINAL TRACT IMAGING VIA CAPSULE N/A 12/30/2020    Procedure: IMAGING PROCEDURE, GI  TRACT, INTRALUMINAL, VIA CAPSULE;  Surgeon: Keegan Vo MD;  Location: [facility];  Service: Endoscopy;  Laterality: N/A;    JOINT REPLACEMENT      right    KNEE SURGERY Right     SHOULDER SURGERY Right     SHOULDER SURGERY Right     VASECTOMY Bilateral 2011       Current Outpatient Medications   Medication Sig    atorvastatin (LIPITOR) 20 MG tablet TAKE 1 TABLET(20 MG) BY MOUTH EVERY DAY    blood sugar diagnostic Strp 1 strip by Misc.(Non-Drug; Combo Route) route 2 (two) times daily.    cyclobenzaprine (FLEXERIL) 10 MG tablet TAKE 1 TABLET BY MOUTH TWICE DAILY AS NEEDED FOR SPASMS    ferrous sulfate (FEOSOL) 325 mg (65 mg iron) Tab tablet Take 1 tablet (325 mg total) by mouth 4 (four) times a week.    HYDROcodone-acetaminophen (NORCO)  mg per tablet TAKE 1 TABLET BY MOUTH EVERY 8 HOURS FOR 10 DAYS    ibuprofen (ADVIL,MOTRIN) 800 MG tablet Take 1 tablet (800 mg total) by mouth 3 (three) times daily.    lancets Misc 1 each by Misc.(Non-Drug; Combo Route) route 2 (two) times daily.    lancing device (BD LANCET DEVICE) Misc 1 each by Misc.(Non-Drug; Combo Route) route 2 (two) times daily.    meloxicam (MOBIC) 15 MG tablet Take 15 mg by mouth daily as needed.    metFORMIN (GLUCOPHAGE) 1000 MG tablet TAKE 1/2 TABLET BY MOUTH TWICE DAILY WITH MEALS    pantoprazole (PROTONIX) 40 MG tablet TAKE 1 TABLET(40 MG) BY MOUTH EVERY DAY    semaglutide (RYBELSUS) 7 mg tablet Take 1 tablet (7 mg total) by mouth once daily.    sucralfate (CARAFATE) 1 gram tablet Take 1 g by mouth 4 (four) times daily.    TURMERIC ORAL Take by mouth.    albuterol (PROVENTIL HFA) 90 mcg/actuation inhaler Inhale 2 puffs into the lungs every 6 (six) hours as needed for Wheezing. Rescue    albuterol-ipratropium (DUO-NEB) 2.5 mg-0.5 mg/3 mL nebulizer solution Take 3 mLs by nebulization every 6 (six) hours as needed for Wheezing or Shortness of Breath. Rescue    ketoconazole (NIZORAL) 2 % cream Apply topically 2 (two) times  daily.     No current facility-administered medications for this visit.       Review of patient's allergies indicates:   Allergen Reactions    Lisinopril Other (See Comments)     cough    Losartan Other (See Comments)     cough       Family History   Problem Relation Age of Onset    Heart block Mother     Kidney disease Mother     Cancer Paternal Aunt        Social History     Socioeconomic History    Marital status: Single   Tobacco Use    Smoking status: Former Smoker     Packs/day: 0.25     Years: 0.50     Pack years: 0.12     Start date:      Quit date:      Years since quittin.6    Smokeless tobacco: Never Used    Tobacco comment: less than a year, smoked during divorce   Substance and Sexual Activity    Alcohol use: Never     Comment: Not anymore    Drug use: No    Sexual activity: Yes       Chief Complaint:   Chief Complaint   Patient presents with    Left Knee - Pain, Follow-up, Osteoarthritis     Left Knee F/UI. He had Kenalog to the knee on 2022. He states the injection helped his pain has decreased.          History of present illness:    This is a 57 y.o. year old male who complains of patient has a history of osteoarthritis to the left knee he is following up today he had a Kenalog injection about 2 months ago .  He states that the injection helped his pain his pain is decreased patient says he is having minimal discomfort at this time he had a good    Review of Systems:    Constitution: Denies chills, fever, and sweats.  HENT: Denies headaches or blurry vision.  Cardiovascular: Denies chest pain or irregular heart beat.  Respiratory: Denies cough or shortness of breath.  Gastrointestinal: Denies abdominal pain, nausea, or vomiting.  Musculoskeletal:  Denies muscle cramps.  Neurological: Denies dizziness or focal weakness.  Psychiatric/Behavioral: Normal mental status.  Hematologic/Lymphatic: Denies bleeding problem or easy bruising/bleeding.  Skin: Denies rash or  suspicious lesions.  Reaction with the cortisone injection    Examination:    Vital Signs:  There were no vitals filed for this visit.    There is no height or weight on file to calculate BMI.    This a well-developed, well nourished patient in no acute distress.    Alert and oriented x 3 and cooperative to examination.       Physical Exam:  Left knee-no effusion to the left knee good range of motion he ambulates with minimal discomfort    Imaging:  X-rays of his left knee show severe osteoarthritis       Assessment: Primary osteoarthritis of left knee        Plan:  We going to watch him right now we not give him any injections he is going to try to get his weight down he is also going for some tests for possible spot on his lung I am going to have him return as needed      DISCLAIMER: This note may have been dictated using voice recognition software and may contain grammatical errors.     NOTE: Consult report sent to referring provider via [employer].

## 2022-08-03 ENCOUNTER — OFFICE VISIT (OUTPATIENT)
Dept: VASCULAR SURGERY | Facility: CLINIC | Age: 57
End: 2022-08-03
Payer: COMMERCIAL

## 2022-08-03 VITALS
DIASTOLIC BLOOD PRESSURE: 93 MMHG | SYSTOLIC BLOOD PRESSURE: 131 MMHG | HEIGHT: 67 IN | BODY MASS INDEX: 41.35 KG/M2 | HEART RATE: 97 BPM

## 2022-08-03 DIAGNOSIS — I71.20 THORACIC AORTIC ANEURYSM WITHOUT RUPTURE: ICD-10-CM

## 2022-08-03 PROCEDURE — 3080F DIAST BP >= 90 MM HG: CPT | Mod: CPTII,S$GLB,, | Performed by: THORACIC SURGERY (CARDIOTHORACIC VASCULAR SURGERY)

## 2022-08-03 PROCEDURE — 1159F MED LIST DOCD IN RCRD: CPT | Mod: CPTII,S$GLB,, | Performed by: THORACIC SURGERY (CARDIOTHORACIC VASCULAR SURGERY)

## 2022-08-03 PROCEDURE — 1160F RVW MEDS BY RX/DR IN RCRD: CPT | Mod: CPTII,S$GLB,, | Performed by: THORACIC SURGERY (CARDIOTHORACIC VASCULAR SURGERY)

## 2022-08-03 PROCEDURE — 3051F HG A1C>EQUAL 7.0%<8.0%: CPT | Mod: CPTII,S$GLB,, | Performed by: THORACIC SURGERY (CARDIOTHORACIC VASCULAR SURGERY)

## 2022-08-03 PROCEDURE — 3008F PR BODY MASS INDEX (BMI) DOCUMENTED: ICD-10-PCS | Mod: CPTII,S$GLB,, | Performed by: THORACIC SURGERY (CARDIOTHORACIC VASCULAR SURGERY)

## 2022-08-03 PROCEDURE — 3008F BODY MASS INDEX DOCD: CPT | Mod: CPTII,S$GLB,, | Performed by: THORACIC SURGERY (CARDIOTHORACIC VASCULAR SURGERY)

## 2022-08-03 PROCEDURE — 1159F PR MEDICATION LIST DOCUMENTED IN MEDICAL RECORD: ICD-10-PCS | Mod: CPTII,S$GLB,, | Performed by: THORACIC SURGERY (CARDIOTHORACIC VASCULAR SURGERY)

## 2022-08-03 PROCEDURE — 99204 OFFICE O/P NEW MOD 45 MIN: CPT | Mod: S$GLB,,, | Performed by: THORACIC SURGERY (CARDIOTHORACIC VASCULAR SURGERY)

## 2022-08-03 PROCEDURE — 3080F PR MOST RECENT DIASTOLIC BLOOD PRESSURE >= 90 MM HG: ICD-10-PCS | Mod: CPTII,S$GLB,, | Performed by: THORACIC SURGERY (CARDIOTHORACIC VASCULAR SURGERY)

## 2022-08-03 PROCEDURE — 1160F PR REVIEW ALL MEDS BY PRESCRIBER/CLIN PHARMACIST DOCUMENTED: ICD-10-PCS | Mod: CPTII,S$GLB,, | Performed by: THORACIC SURGERY (CARDIOTHORACIC VASCULAR SURGERY)

## 2022-08-03 PROCEDURE — 99204 PR OFFICE/OUTPT VISIT, NEW, LEVL IV, 45-59 MIN: ICD-10-PCS | Mod: S$GLB,,, | Performed by: THORACIC SURGERY (CARDIOTHORACIC VASCULAR SURGERY)

## 2022-08-03 PROCEDURE — 3075F SYST BP GE 130 - 139MM HG: CPT | Mod: CPTII,S$GLB,, | Performed by: THORACIC SURGERY (CARDIOTHORACIC VASCULAR SURGERY)

## 2022-08-03 PROCEDURE — 3075F PR MOST RECENT SYSTOLIC BLOOD PRESS GE 130-139MM HG: ICD-10-PCS | Mod: CPTII,S$GLB,, | Performed by: THORACIC SURGERY (CARDIOTHORACIC VASCULAR SURGERY)

## 2022-08-03 PROCEDURE — 3051F PR MOST RECENT HEMOGLOBIN A1C LEVEL 7.0 - < 8.0%: ICD-10-PCS | Mod: CPTII,S$GLB,, | Performed by: THORACIC SURGERY (CARDIOTHORACIC VASCULAR SURGERY)

## 2022-08-03 NOTE — PROGRESS NOTES
This patient was referred to the office with an ascending thoracic aortic aneurysm at 5.0 cm in greatest transverse dimension.  The patient has been asymptomatic from this.  The patient has had shortness of breath and this CT scan for this and the presence within the last year of COVID.  He has had a very slow recovery from his COVID infection.  The patient is not a smoker.  Has borderline diabetes.  He has chronic back pain.  He has had no recent major surgeries.  Family history is really not pertinent at this time.  On exam vital signs stable.  Pupils are equal and round reactive to light.  Neck is supple.  Chest is equal breath sounds.  Heart is in a regular rate and rhythm.  Abdomen is benign.  Perfusion to the legs and feet seems to be satisfactory.  The CT scan was reviewed.  The patient has a 5 cm ascending thoracic aortic aneurysm.  Medical management is warranted.  He should have repeat CT scan of the chest in 6-8 months.  He should be referred to the pulmonology service for persistent shortness of breath status post COVID pneumonia.

## 2022-08-25 ENCOUNTER — TELEPHONE (OUTPATIENT)
Dept: PULMONOLOGY | Facility: CLINIC | Age: 57
End: 2022-08-25
Payer: COMMERCIAL

## 2022-09-13 ENCOUNTER — LAB VISIT (OUTPATIENT)
Dept: LAB | Facility: HOSPITAL | Age: 57
End: 2022-09-13
Attending: INTERNAL MEDICINE
Payer: COMMERCIAL

## 2022-09-13 ENCOUNTER — OFFICE VISIT (OUTPATIENT)
Dept: PULMONOLOGY | Facility: CLINIC | Age: 57
End: 2022-09-13
Payer: COMMERCIAL

## 2022-09-13 VITALS
DIASTOLIC BLOOD PRESSURE: 90 MMHG | OXYGEN SATURATION: 96 % | BODY MASS INDEX: 40.14 KG/M2 | HEART RATE: 76 BPM | SYSTOLIC BLOOD PRESSURE: 136 MMHG | WEIGHT: 255.75 LBS | HEIGHT: 67 IN

## 2022-09-13 DIAGNOSIS — M79.89 CALF SWELLING: ICD-10-CM

## 2022-09-13 DIAGNOSIS — Z86.718 HISTORY OF DVT IN ADULTHOOD: ICD-10-CM

## 2022-09-13 DIAGNOSIS — R06.02 SHORTNESS OF BREATH: ICD-10-CM

## 2022-09-13 DIAGNOSIS — R05.9 COUGH: ICD-10-CM

## 2022-09-13 DIAGNOSIS — R79.89 ELEVATED D-DIMER: ICD-10-CM

## 2022-09-13 DIAGNOSIS — G47.33 OSA (OBSTRUCTIVE SLEEP APNEA): ICD-10-CM

## 2022-09-13 DIAGNOSIS — R09.89 CHRONIC SINUS COMPLAINTS: ICD-10-CM

## 2022-09-13 DIAGNOSIS — I70.0 CALCIFICATION OF AORTA: ICD-10-CM

## 2022-09-13 DIAGNOSIS — R91.8 LUNG NODULES: ICD-10-CM

## 2022-09-13 DIAGNOSIS — Z86.16 HISTORY OF COVID-19: ICD-10-CM

## 2022-09-13 DIAGNOSIS — J45.40 MODERATE PERSISTENT ASTHMA, UNCOMPLICATED: Primary | ICD-10-CM

## 2022-09-13 LAB — D DIMER PPP IA.FEU-MCNC: 0.54 MG/L FEU

## 2022-09-13 PROCEDURE — 3080F DIAST BP >= 90 MM HG: CPT | Mod: CPTII,S$GLB,, | Performed by: INTERNAL MEDICINE

## 2022-09-13 PROCEDURE — 99999 PR PBB SHADOW E&M-EST. PATIENT-LVL V: CPT | Mod: PBBFAC,,, | Performed by: INTERNAL MEDICINE

## 2022-09-13 PROCEDURE — 3051F PR MOST RECENT HEMOGLOBIN A1C LEVEL 7.0 - < 8.0%: ICD-10-PCS | Mod: CPTII,S$GLB,, | Performed by: INTERNAL MEDICINE

## 2022-09-13 PROCEDURE — 3080F PR MOST RECENT DIASTOLIC BLOOD PRESSURE >= 90 MM HG: ICD-10-PCS | Mod: CPTII,S$GLB,, | Performed by: INTERNAL MEDICINE

## 2022-09-13 PROCEDURE — 85379 FIBRIN DEGRADATION QUANT: CPT | Performed by: INTERNAL MEDICINE

## 2022-09-13 PROCEDURE — 1159F PR MEDICATION LIST DOCUMENTED IN MEDICAL RECORD: ICD-10-PCS | Mod: CPTII,S$GLB,, | Performed by: INTERNAL MEDICINE

## 2022-09-13 PROCEDURE — 3008F PR BODY MASS INDEX (BMI) DOCUMENTED: ICD-10-PCS | Mod: CPTII,S$GLB,, | Performed by: INTERNAL MEDICINE

## 2022-09-13 PROCEDURE — 99204 OFFICE O/P NEW MOD 45 MIN: CPT | Mod: S$GLB,,, | Performed by: INTERNAL MEDICINE

## 2022-09-13 PROCEDURE — 99204 PR OFFICE/OUTPT VISIT, NEW, LEVL IV, 45-59 MIN: ICD-10-PCS | Mod: S$GLB,,, | Performed by: INTERNAL MEDICINE

## 2022-09-13 PROCEDURE — 3008F BODY MASS INDEX DOCD: CPT | Mod: CPTII,S$GLB,, | Performed by: INTERNAL MEDICINE

## 2022-09-13 PROCEDURE — 99999 PR PBB SHADOW E&M-EST. PATIENT-LVL V: ICD-10-PCS | Mod: PBBFAC,,, | Performed by: INTERNAL MEDICINE

## 2022-09-13 PROCEDURE — 3075F SYST BP GE 130 - 139MM HG: CPT | Mod: CPTII,S$GLB,, | Performed by: INTERNAL MEDICINE

## 2022-09-13 PROCEDURE — 3051F HG A1C>EQUAL 7.0%<8.0%: CPT | Mod: CPTII,S$GLB,, | Performed by: INTERNAL MEDICINE

## 2022-09-13 PROCEDURE — 3075F PR MOST RECENT SYSTOLIC BLOOD PRESS GE 130-139MM HG: ICD-10-PCS | Mod: CPTII,S$GLB,, | Performed by: INTERNAL MEDICINE

## 2022-09-13 PROCEDURE — 1159F MED LIST DOCD IN RCRD: CPT | Mod: CPTII,S$GLB,, | Performed by: INTERNAL MEDICINE

## 2022-09-13 PROCEDURE — 36415 COLL VENOUS BLD VENIPUNCTURE: CPT | Performed by: INTERNAL MEDICINE

## 2022-09-13 RX ORDER — ALBUTEROL SULFATE 90 UG/1
2 AEROSOL, METERED RESPIRATORY (INHALATION) EVERY 6 HOURS PRN
Qty: 18 G | Refills: 1 | Status: SHIPPED | OUTPATIENT
Start: 2022-09-13 | End: 2024-02-05 | Stop reason: SDUPTHER

## 2022-09-13 RX ORDER — PREDNISONE 20 MG/1
TABLET ORAL
Qty: 12 TABLET | Refills: 0 | Status: SHIPPED | OUTPATIENT
Start: 2022-09-13 | End: 2023-01-10

## 2022-09-13 RX ORDER — MONTELUKAST SODIUM 10 MG/1
10 TABLET ORAL NIGHTLY
Qty: 30 TABLET | Refills: 11 | Status: SHIPPED | OUTPATIENT
Start: 2022-09-13 | End: 2022-10-13

## 2022-09-13 RX ORDER — FLUTICASONE FUROATE, UMECLIDINIUM BROMIDE AND VILANTEROL TRIFENATATE 200; 62.5; 25 UG/1; UG/1; UG/1
1 POWDER RESPIRATORY (INHALATION) DAILY
Qty: 60 EACH | Refills: 11 | Status: SHIPPED | OUTPATIENT
Start: 2022-09-13

## 2022-09-13 NOTE — PROGRESS NOTES
9/13/2022    José Miguel Garcia  New Patient Consult    Chief Complaint   Patient presents with    COPD    Shortness of Breath    Covid History     Had covid back in January 2021 still feeling SOB since.        HPI: pt worked [employer], , never smoker- 6 months in 2000..   pt disabled now with bad knees.  Had vague asbestos exposure for a 14 days or so during asbestos abatement at workplace-- work area was supposed sealed off and pt noted some dust in air and desk within last 4 yrs.      Pt has chr cough/wheeze in am, uses albuterol. Cough prod clear mucous.  Had dvt in 2011 after.  No controller.  Toleraes wheezes and has symtpoms throughout day.  Had chr sinus stuffy.      Ct done with wheezes and cough.  Faint upper lung nodules seen.  Ct viewed -- <<6 mm nodules -- very numerous seen upon review.         PFSH:  Past Medical History:   Diagnosis Date    COVID-19 02/2020    Diabetes mellitus     type 2    DVT (deep venous thrombosis) 2011    Approx. 2011, after having a bunion removed on left. pt was on warfarin for approx. 6 weeks. unsure of time    Hypertension     Sickle cell anemia trait     pt states all his life    Sleep apnea     USES CPAP    Wears glasses          Past Surgical History:   Procedure Laterality Date    ARTHROSCOPIC REPAIR OF ROTATOR CUFF OF SHOULDER Left 12/16/2021    Procedure: REPAIR, ROTATOR CUFF, ARTHROSCOPIC;  Surgeon: Ezekiel Schaefer MD;  Location: [facility] OR;  Service: Orthopedics;  Laterality: Left;    ARTHROSCOPIC TENOTOMY OF BICEPS TENDON Left 12/16/2021    Procedure: TENOTOMY, BICEPS, ARTHROSCOPIC;  Surgeon: Ezekiel Schaefer MD;  Location: [facility] OR;  Service: Orthopedics;  Laterality: Left;    ARTHROSCOPY OF SHOULDER WITH DECOMPRESSION OF SUBACROMIAL SPACE Left 12/16/2021    Procedure: ARTHROSCOPY, SHOULDER, WITH SUBACROMIAL SPACE DECOMPRESSION;  Surgeon: Ezekiel Schaefer MD;  Location: [facility] OR;  Service: Orthopedics;  Laterality: Left;  GENERAL AND BLOCK     BUNIONECTOMY Left     Pt developed a blood clot in leg, after having bunion removed. Had to do coumadin for approx. 6 weeks.    BUNIONECTOMY Left     complicated by dvt    COLONOSCOPY N/A 2016    Procedure: COLONOSCOPY;  Surgeon: Keegan Vo MD;  Location: [facility] ENDO;  Service: Endoscopy;  Laterality: N/A;    COLONOSCOPY N/A 2020    Procedure: COLONOSCOPY;  Surgeon: Keegan Vo MD;  Location: [facility] ENDO;  Service: Endoscopy;  Laterality: N/A;    EPIDURAL STEROID INJECTION  2019    lumbar    ESOPHAGOGASTRODUODENOSCOPY N/A 2020    Procedure: EGD (ESOPHAGOGASTRODUODENOSCOPY);  Surgeon: Keegan Vo MD;  Location: [facility] ENDO;  Service: Endoscopy;  Laterality: N/A;    INTRALUMINAL GASTROINTESTINAL TRACT IMAGING VIA CAPSULE N/A 2020    Procedure: IMAGING PROCEDURE, GI TRACT, INTRALUMINAL, VIA CAPSULE;  Surgeon: Keegan Vo MD;  Location: [facility] ENDO;  Service: Endoscopy;  Laterality: N/A;    JOINT REPLACEMENT      right    KNEE SURGERY Right     SHOULDER SURGERY Right     SHOULDER SURGERY Right     VASECTOMY Bilateral      Social History     Tobacco Use    Smoking status: Former     Packs/day: 0.25     Years: 0.50     Pack years: 0.13     Types: Cigarettes     Start date: 2000     Quit date: 2000     Years since quittin.7    Smokeless tobacco: Never    Tobacco comments:     less than a year, smoked during divorce   Substance Use Topics    Alcohol use: Never     Comment: Not anymore    Drug use: No     Family History   Problem Relation Age of Onset    Heart block Mother     Kidney disease Mother     Cancer Paternal Aunt      Review of patient's allergies indicates:   Allergen Reactions    Lisinopril Other (See Comments)     cough    Losartan Other (See Comments)     cough       Performance Status:The patient's activity level is housebound activities.      Review of Systems:  a review of eleven systems covering constitutional, Eye, HEENT, Psych, Respiratory,  "Cardiac, GI, , Musculoskeletal, Endocrine, Dermatologic was negative except for pertinent findings as listed ABOVE and below:  pertinent positive as above, rest is good       Exam:Comprehensive exam done. BP (!) 136/90 (BP Location: Left arm, Patient Position: Sitting, BP Method: Large (Automatic))   Pulse 76   Ht 5' 7" (1.702 m)   Wt 116 kg (255 lb 11.7 oz)   SpO2 96% Comment: on room air at rest  BMI 40.05 kg/m²   Exam included Vitals as listed, and patient's appearance and affect and alertness and mood, oral exam for yeast and hygiene and pharynx lesions and Mallapatti (M) score, neck with inspection for jvd and masses and thyroid abnormalities and lymph nodes (supraclavicular and infraclavicular nodes and axillary also examined and noted if abn), chest exam included symmetry and effort and fremitus and percussion and auscultation, cardiac exam included rhythm and gallops and murmur and rubs and jvd and edema, abdominal exam for mass and hepatosplenomegaly and tenderness and hernias and bowel sounds, Musculoskeletal exam with muscle tone and posture and mobility/gait and  strength, and skin for rashes and cyanosis and pallor and turgor, extremity for clubbing.  Findings were normal except for pertinent findings listed below:  M4, morbid, chest is symmetric, no distress, normal percussion, normal fremitus and good normal breath sounds right calf sl larger than left         Radiographs (ct chest and cxr) reviewed: view by direct vision  faint numerous upper lung nodules < 5 mm or so.  Aorta and coronaries calcified-- viewed with pt.     Labs reviewed           PFT was not done       Plan:  Clinical impression is apparently straight forward and impression with management as below.    José Miguel was seen today for copd, shortness of breath and covid history.    Diagnoses and all orders for this visit:    Moderate persistent asthma, uncomplicated  -     fluticasone-umeclidin-vilanter (TRELEGY ELLIPTA) 200-62.5-25 " mcg inhaler; Inhale 1 puff into the lungs once daily.  -     predniSONE (DELTASONE) 20 MG tablet; Take one daily for 3 days and may repeat for shortness of breath.  -     montelukast (SINGULAIR) 10 mg tablet; Take 1 tablet (10 mg total) by mouth every evening.    History of COVID-19  -     Ambulatory referral/consult to Pulmonology    MIKE (obstructive sleep apnea)  -     Ambulatory referral/consult to Pulmonology    Cough  -     Ambulatory referral/consult to Pulmonology  -     fluticasone-umeclidin-vilanter (TRELEGY ELLIPTA) 200-62.5-25 mcg inhaler; Inhale 1 puff into the lungs once daily.    Shortness of breath  -     Ambulatory referral/consult to Pulmonology  -     albuterol (PROVENTIL HFA) 90 mcg/actuation inhaler; Inhale 2 puffs into the lungs every 6 (six) hours as needed for Wheezing. Rescue    Lung nodules  -     AFB Culture & Smear; Standing    Calcification of aorta    History of DVT in adulthood  -     D-DIMER, QUANTITATIVE; Future    Calf swelling  -     D-DIMER, QUANTITATIVE; Future    Chronic sinus complaints  -     montelukast (SINGULAIR) 10 mg tablet; Take 1 tablet (10 mg total) by mouth every evening.      Follow up if symptoms worsen or fail to improve.    Discussed with patient above for education the following:      Patient Instructions   Lung nodules do not look significant.  Routine follow up not needed but will need ct f/u aneurysm.    You have asthma - moderate persistent asthma -- would recommend trelegy once daily  for 24/7 control.  Use albuterol as needed.  Use prednisone 20 mg daily for 3 days if bad cough wheezes.     You may have chronic lung infection-- check for cousin of tb -- need 3 specimens.    Would check ddimer to assure no clots-- you may have blood clot risk with bunion related clot.    Use afrin for nasal stuffy and use cpap device-- just bedtime and avoid as able.      Singulair may help sinus and asthma-- try once bedtime and continue if helpful.    May consider bariatric  surgery??   Diabetes medication may help weight loss.    you have calcified coronary arteries --stress test ok                      Pt's evaluation took 57 min.

## 2022-09-13 NOTE — PATIENT INSTRUCTIONS
Lung nodules do not look significant.  Routine follow up not needed but will need ct f/u aneurysm.    You have asthma - moderate persistent asthma -- would recommend trelegy once daily  for 24/7 control.  Use albuterol as needed.  Use prednisone 20 mg daily for 3 days if bad cough wheezes.     You may have chronic lung infection-- check for cousin of tb -- need 3 specimens.    Would check ddimer to assure no clots-- you may have blood clot risk with bunion related clot.    Use afrin for nasal stuffy and use cpap device-- just bedtime and avoid as able.      Singulair may help sinus and asthma-- try once bedtime and continue if helpful.    May consider bariatric surgery??   Diabetes medication may help weight loss.    you have calcified coronary arteries --stress test ok

## 2022-09-16 ENCOUNTER — HOSPITAL ENCOUNTER (OUTPATIENT)
Dept: RADIOLOGY | Facility: HOSPITAL | Age: 57
Discharge: HOME | End: 2022-09-16
Attending: INTERNAL MEDICINE
Payer: COMMERCIAL

## 2022-09-16 DIAGNOSIS — R79.89 ELEVATED D-DIMER: ICD-10-CM

## 2022-09-16 DIAGNOSIS — Z86.718 HISTORY OF DVT IN ADULTHOOD: ICD-10-CM

## 2022-09-16 DIAGNOSIS — M79.89 CALF SWELLING: ICD-10-CM

## 2022-09-16 PROCEDURE — 93970 EXTREMITY STUDY: CPT | Mod: 26,,, | Performed by: RADIOLOGY

## 2022-09-16 PROCEDURE — 93970 EXTREMITY STUDY: CPT | Mod: TC

## 2022-09-16 PROCEDURE — 93970 US LOWER EXTREMITY VEINS BILATERAL: ICD-10-PCS | Mod: 26,,, | Performed by: RADIOLOGY

## 2022-09-23 ENCOUNTER — TELEPHONE (OUTPATIENT)
Dept: PULMONOLOGY | Facility: CLINIC | Age: 57
End: 2022-09-23
Payer: COMMERCIAL

## 2022-09-23 NOTE — TELEPHONE ENCOUNTER
Spoke to patient   ----- Message from Donovan Oswald MD sent at 9/19/2022  8:09 AM CDT -----  Notify no blood clot seen- no change in plan

## 2023-01-03 ENCOUNTER — HOSPITAL ENCOUNTER (OUTPATIENT)
Dept: RADIOLOGY | Facility: HOSPITAL | Age: 58
Discharge: HOME | End: 2023-01-03
Attending: NURSE PRACTITIONER
Payer: COMMERCIAL

## 2023-01-03 DIAGNOSIS — I71.20 THORACIC AORTIC ANEURYSM WITHOUT RUPTURE: ICD-10-CM

## 2023-01-03 PROCEDURE — 71250 CT CHEST WITHOUT CONTRAST: ICD-10-PCS | Mod: 26,,, | Performed by: RADIOLOGY

## 2023-01-03 PROCEDURE — 71250 CT THORAX DX C-: CPT | Mod: 26,,, | Performed by: RADIOLOGY

## 2023-01-03 PROCEDURE — 71250 CT THORAX DX C-: CPT | Mod: TC

## 2023-06-06 NOTE — DISCHARGE INSTRUCTIONS
Pain injection instructions:     Steroids take about a week to relieve pain.  Initially you may get pain relief from the local anesthetic but this may wear off before the steroid works.    No driving for 24 hrs   Activity as tolerated- gradually increase activities.  Dont lift over 10 lbs for 24 hrs   No heat at injection sites x 2 days  Use ice for mild swelling and for comfort.  May shower today. No baths for two days.      Resume Aspirin, Plavix, or Coumadin the day after the procedure unless otherwise instructed.   If diabetic,monitor your glucose carefully as steroids can increase glucose level    Seek immediate medical help for:   Severe increase in your usual pain or appearance of new pain.  Prolonged or increasing weakness or numbness in the legs or arms.    - Numbing medicine was injected that affects nerves that carry information from       muscles to brain and vice versa.  This numbness can last 4-6 hrs so be very careful walking.    Fever above 101 ,Drainage,redness,active bleeding, or increased swelling at the injection site.  Headache, shortness of breath, chest pain, or breathing problems.         normal

## 2023-10-27 ENCOUNTER — HOSPITAL ENCOUNTER (EMERGENCY)
Facility: HOSPITAL | Age: 58
Discharge: HOME | End: 2023-10-27
Attending: STUDENT IN AN ORGANIZED HEALTH CARE EDUCATION/TRAINING PROGRAM
Payer: COMMERCIAL

## 2023-10-27 VITALS
BODY MASS INDEX: 37.67 KG/M2 | DIASTOLIC BLOOD PRESSURE: 91 MMHG | WEIGHT: 240 LBS | RESPIRATION RATE: 18 BRPM | HEART RATE: 90 BPM | HEIGHT: 67 IN | TEMPERATURE: 99 F | OXYGEN SATURATION: 98 % | SYSTOLIC BLOOD PRESSURE: 145 MMHG

## 2023-10-27 DIAGNOSIS — R10.9 LEFT FLANK PAIN: Primary | ICD-10-CM

## 2023-10-27 DIAGNOSIS — N20.0 KIDNEY STONE: ICD-10-CM

## 2023-10-27 LAB
ALBUMIN SERPL BCP-MCNC: 4.3 G/DL (ref 3.5–5.2)
ALP SERPL-CCNC: 69 U/L (ref 55–135)
ALT SERPL W/O P-5'-P-CCNC: 18 U/L (ref 10–44)
ANION GAP SERPL CALC-SCNC: 13 MMOL/L (ref 8–16)
AST SERPL-CCNC: 18 U/L (ref 10–40)
BACTERIA #/AREA URNS HPF: NORMAL /HPF
BASOPHILS # BLD AUTO: 0.04 K/UL (ref 0–0.2)
BASOPHILS NFR BLD: 0.6 % (ref 0–1.9)
BILIRUB SERPL-MCNC: 0.3 MG/DL (ref 0.1–1)
BILIRUB UR QL STRIP: NEGATIVE
BUN SERPL-MCNC: 16 MG/DL (ref 6–20)
CALCIUM SERPL-MCNC: 9.8 MG/DL (ref 8.7–10.5)
CHLORIDE SERPL-SCNC: 104 MMOL/L (ref 95–110)
CLARITY UR: CLEAR
CO2 SERPL-SCNC: 23 MMOL/L (ref 23–29)
COLOR UR: YELLOW
CREAT SERPL-MCNC: 1.5 MG/DL (ref 0.5–1.4)
DIFFERENTIAL METHOD: ABNORMAL
EOSINOPHIL # BLD AUTO: 0.1 K/UL (ref 0–0.5)
EOSINOPHIL NFR BLD: 1.1 % (ref 0–8)
ERYTHROCYTE [DISTWIDTH] IN BLOOD BY AUTOMATED COUNT: 15 % (ref 11.5–14.5)
EST. GFR  (NO RACE VARIABLE): 53 ML/MIN/1.73 M^2
GLUCOSE SERPL-MCNC: 157 MG/DL (ref 70–110)
GLUCOSE UR QL STRIP: NEGATIVE
HCT VFR BLD AUTO: 39.3 % (ref 40–54)
HGB BLD-MCNC: 12.8 G/DL (ref 14–18)
HGB UR QL STRIP: ABNORMAL
HYALINE CASTS #/AREA URNS LPF: 0 /LPF
IMM GRANULOCYTES # BLD AUTO: 0.02 K/UL (ref 0–0.04)
IMM GRANULOCYTES NFR BLD AUTO: 0.3 % (ref 0–0.5)
KETONES UR QL STRIP: NEGATIVE
LEUKOCYTE ESTERASE UR QL STRIP: NEGATIVE
LIPASE SERPL-CCNC: 42 U/L (ref 4–60)
LYMPHOCYTES # BLD AUTO: 1.7 K/UL (ref 1–4.8)
LYMPHOCYTES NFR BLD: 23.9 % (ref 18–48)
MCH RBC QN AUTO: 27.4 PG (ref 27–31)
MCHC RBC AUTO-ENTMCNC: 32.6 G/DL (ref 32–36)
MCV RBC AUTO: 84 FL (ref 82–98)
MICROSCOPIC COMMENT: NORMAL
MONOCYTES # BLD AUTO: 0.4 K/UL (ref 0.3–1)
MONOCYTES NFR BLD: 5.8 % (ref 4–15)
NEUTROPHILS # BLD AUTO: 4.9 K/UL (ref 1.8–7.7)
NEUTROPHILS NFR BLD: 68.3 % (ref 38–73)
NITRITE UR QL STRIP: NEGATIVE
NRBC BLD-RTO: 0 /100 WBC
PH UR STRIP: 6 [PH] (ref 5–8)
PLATELET # BLD AUTO: 214 K/UL (ref 150–450)
PMV BLD AUTO: 10.2 FL (ref 9.2–12.9)
POCT GLUCOSE: 139 MG/DL (ref 70–110)
POTASSIUM SERPL-SCNC: 4.1 MMOL/L (ref 3.5–5.1)
PROT SERPL-MCNC: 7.8 G/DL (ref 6–8.4)
PROT UR QL STRIP: ABNORMAL
RBC # BLD AUTO: 4.67 M/UL (ref 4.6–6.2)
RBC #/AREA URNS HPF: 1 /HPF (ref 0–4)
SODIUM SERPL-SCNC: 140 MMOL/L (ref 136–145)
SP GR UR STRIP: 1.02 (ref 1–1.03)
SQUAMOUS #/AREA URNS HPF: 0 /HPF
URN SPEC COLLECT METH UR: ABNORMAL
UROBILINOGEN UR STRIP-ACNC: NEGATIVE EU/DL
WBC # BLD AUTO: 7.12 K/UL (ref 3.9–12.7)
WBC #/AREA URNS HPF: 0 /HPF (ref 0–5)

## 2023-10-27 PROCEDURE — 82962 GLUCOSE BLOOD TEST: CPT

## 2023-10-27 PROCEDURE — 99285 EMERGENCY DEPT VISIT HI MDM: CPT | Mod: 25

## 2023-10-27 PROCEDURE — 96375 TX/PRO/DX INJ NEW DRUG ADDON: CPT

## 2023-10-27 PROCEDURE — 83690 ASSAY OF LIPASE: CPT | Performed by: STUDENT IN AN ORGANIZED HEALTH CARE EDUCATION/TRAINING PROGRAM

## 2023-10-27 PROCEDURE — 96361 HYDRATE IV INFUSION ADD-ON: CPT

## 2023-10-27 PROCEDURE — 80053 COMPREHEN METABOLIC PANEL: CPT | Performed by: STUDENT IN AN ORGANIZED HEALTH CARE EDUCATION/TRAINING PROGRAM

## 2023-10-27 PROCEDURE — 96374 THER/PROPH/DIAG INJ IV PUSH: CPT

## 2023-10-27 PROCEDURE — 63600175 PHARM REV CODE 636 W HCPCS: Performed by: STUDENT IN AN ORGANIZED HEALTH CARE EDUCATION/TRAINING PROGRAM

## 2023-10-27 PROCEDURE — 74176 CT ABD & PELVIS W/O CONTRAST: CPT | Mod: 26,,, | Performed by: RADIOLOGY

## 2023-10-27 PROCEDURE — 85025 COMPLETE CBC W/AUTO DIFF WBC: CPT | Performed by: STUDENT IN AN ORGANIZED HEALTH CARE EDUCATION/TRAINING PROGRAM

## 2023-10-27 PROCEDURE — 81000 URINALYSIS NONAUTO W/SCOPE: CPT | Performed by: STUDENT IN AN ORGANIZED HEALTH CARE EDUCATION/TRAINING PROGRAM

## 2023-10-27 PROCEDURE — 74176 CT ABD & PELVIS W/O CONTRAST: CPT | Mod: TC

## 2023-10-27 PROCEDURE — 74176 CT ABDOMEN PELVIS WITHOUT CONTRAST: ICD-10-PCS | Mod: 26,,, | Performed by: RADIOLOGY

## 2023-10-27 PROCEDURE — 25000003 PHARM REV CODE 250: Performed by: STUDENT IN AN ORGANIZED HEALTH CARE EDUCATION/TRAINING PROGRAM

## 2023-10-27 RX ORDER — KETOROLAC TROMETHAMINE 30 MG/ML
15 INJECTION, SOLUTION INTRAMUSCULAR; INTRAVENOUS
Status: COMPLETED | OUTPATIENT
Start: 2023-10-27 | End: 2023-10-27

## 2023-10-27 RX ORDER — HYDROCODONE BITARTRATE AND ACETAMINOPHEN 5; 325 MG/1; MG/1
1 TABLET ORAL EVERY 6 HOURS PRN
Qty: 12 TABLET | Refills: 0 | Status: SHIPPED | OUTPATIENT
Start: 2023-10-27 | End: 2023-11-09 | Stop reason: SDUPTHER

## 2023-10-27 RX ORDER — ONDANSETRON 4 MG/1
4 TABLET, FILM COATED ORAL 3 TIMES DAILY PRN
Qty: 20 TABLET | Refills: 0 | Status: SHIPPED | OUTPATIENT
Start: 2023-10-27

## 2023-10-27 RX ORDER — ONDANSETRON 2 MG/ML
4 INJECTION INTRAMUSCULAR; INTRAVENOUS
Status: COMPLETED | OUTPATIENT
Start: 2023-10-27 | End: 2023-10-27

## 2023-10-27 RX ADMIN — KETOROLAC TROMETHAMINE 15 MG: 30 INJECTION INTRAMUSCULAR; INTRAVENOUS at 04:10

## 2023-10-27 RX ADMIN — SODIUM CHLORIDE 1000 ML: 9 INJECTION, SOLUTION INTRAVENOUS at 04:10

## 2023-10-27 RX ADMIN — ONDANSETRON 4 MG: 2 INJECTION INTRAMUSCULAR; INTRAVENOUS at 04:10

## 2023-10-27 NOTE — ED PROVIDER NOTES
Encounter Date: 10/27/2023       History     Chief Complaint   Patient presents with    Flank Pain     Pt reports left flank pain that started yesterday, denies urinary problems    Nausea     58-year-old male with history of hypertension, DVT, diabetes, sleep apnea uses CPAP to sleep, sickle cell trait.  He presents to ED with complaint of 1 day history of constant left flank pain.  He reports associated nausea.  He denies previous history of kidney stones.  He denies trauma.  He denies recent changes in urinary or bowel habits.  Denies fever, chills chest pain, palpitations, or shortness of breath.    The history is provided by the patient. No  was used.     Review of patient's allergies indicates:   Allergen Reactions    Lisinopril Other (See Comments)     cough    Losartan Other (See Comments)     cough     Past Medical History:   Diagnosis Date    COVID-19 02/2020    Diabetes mellitus     type 2    DVT (deep venous thrombosis) 2011    Approx. 2011, after having a bunion removed on left. pt was on warfarin for approx. 6 weeks. unsure of time    Hypertension     Sickle cell anemia trait     pt states all his life    Sleep apnea     USES CPAP    Wears glasses      Past Surgical History:   Procedure Laterality Date    ARTHROSCOPIC REPAIR OF ROTATOR CUFF OF SHOULDER Left 12/16/2021    Procedure: REPAIR, ROTATOR CUFF, ARTHROSCOPIC;  Surgeon: Ezekiel Schaefer MD;  Location: [facility] OR;  Service: Orthopedics;  Laterality: Left;    ARTHROSCOPIC TENOTOMY OF BICEPS TENDON Left 12/16/2021    Procedure: TENOTOMY, BICEPS, ARTHROSCOPIC;  Surgeon: Ezekiel Schaefer MD;  Location: [facility] OR;  Service: Orthopedics;  Laterality: Left;    ARTHROSCOPY OF SHOULDER WITH DECOMPRESSION OF SUBACROMIAL SPACE Left 12/16/2021    Procedure: ARTHROSCOPY, SHOULDER, WITH SUBACROMIAL SPACE DECOMPRESSION;  Surgeon: Ezekiel Schaefer MD;  Location: [facility] OR;  Service: Orthopedics;  Laterality: Left;  GENERAL AND BLOCK     BUNIONECTOMY Left     Pt developed a blood clot in leg, after having bunion removed. Had to do coumadin for approx. 6 weeks.    BUNIONECTOMY Left     complicated by dvt    COLONOSCOPY N/A 2016    Procedure: COLONOSCOPY;  Surgeon: Keegan Vo MD;  Location: [facility] ENDO;  Service: Endoscopy;  Laterality: N/A;    COLONOSCOPY N/A 2020    Procedure: COLONOSCOPY;  Surgeon: Keegan Vo MD;  Location: [facility] ENDO;  Service: Endoscopy;  Laterality: N/A;    EPIDURAL STEROID INJECTION  2019    lumbar    ESOPHAGOGASTRODUODENOSCOPY N/A 2020    Procedure: EGD (ESOPHAGOGASTRODUODENOSCOPY);  Surgeon: Keegan Vo MD;  Location: [facility] ENDO;  Service: Endoscopy;  Laterality: N/A;    INTRALUMINAL GASTROINTESTINAL TRACT IMAGING VIA CAPSULE N/A 2020    Procedure: IMAGING PROCEDURE, GI TRACT, INTRALUMINAL, VIA CAPSULE;  Surgeon: Keegan Vo MD;  Location: [facility] ENDO;  Service: Endoscopy;  Laterality: N/A;    JOINT REPLACEMENT      right    KNEE SURGERY Right     SHOULDER SURGERY Right     SHOULDER SURGERY Right     VASECTOMY Bilateral      Family History   Problem Relation Age of Onset    Heart block Mother     Kidney disease Mother     Cancer Paternal Aunt      Social History     Tobacco Use    Smoking status: Former     Current packs/day: 0.00     Average packs/day: 0.3 packs/day for 0.9 years (0.2 ttl pk-yrs)     Types: Cigarettes     Start date: 2000     Quit date: 2000     Years since quittin.9    Smokeless tobacco: Never    Tobacco comments:     less than a year, smoked during divorce   Substance Use Topics    Alcohol use: Never     Comment: Not anymore    Drug use: No     Review of Systems   Constitutional: Negative.    HENT: Negative.     Eyes: Negative.    Respiratory: Negative.     Cardiovascular: Negative.    Gastrointestinal:  Positive for abdominal pain.   Endocrine: Negative.    Genitourinary: Negative.    Musculoskeletal: Negative.    Skin: Negative.     Allergic/Immunologic: Negative.    Neurological: Negative.    Hematological: Negative.    Psychiatric/Behavioral: Negative.     All other systems reviewed and are negative.      Physical Exam     Initial Vitals   BP Pulse Resp Temp SpO2   10/27/23 1629 10/27/23 1626 10/27/23 1626 10/27/23 1626 10/27/23 1629   (!) 151/98 90 18 98.5 °F (36.9 °C) 96 %      MAP       --                Physical Exam    Nursing note and vitals reviewed.  Constitutional: He appears well-developed.   HENT:   Head: Normocephalic.   Eyes: Pupils are equal, round, and reactive to light.   Neck: No JVD present.   Normal range of motion.  Cardiovascular:  Normal rate.           Pulmonary/Chest: Breath sounds normal. No respiratory distress.   Abdominal: Abdomen is soft. Bowel sounds are normal. There is abdominal tenderness (Left flank tenderness.  Positive Caden sign). There is guarding.   Musculoskeletal:         General: Normal range of motion.      Cervical back: Normal range of motion.     Neurological: He is alert and oriented to person, place, and time. He has normal strength. GCS score is 15. GCS eye subscore is 4. GCS verbal subscore is 5. GCS motor subscore is 6.   Skin: Skin is warm. Capillary refill takes less than 2 seconds.   Psychiatric: He has a normal mood and affect.         ED Course   Procedures  Labs Reviewed   CBC W/ AUTO DIFFERENTIAL - Abnormal; Notable for the following components:       Result Value    Hemoglobin 12.8 (*)     Hematocrit 39.3 (*)     RDW 15.0 (*)     All other components within normal limits   COMPREHENSIVE METABOLIC PANEL - Abnormal; Notable for the following components:    Glucose 157 (*)     Creatinine 1.5 (*)     eGFR 53.0 (*)     All other components within normal limits   URINALYSIS, REFLEX TO URINE CULTURE - Abnormal; Notable for the following components:    Protein, UA 2+ (*)     Occult Blood UA Trace (*)     All other components within normal limits    Narrative:     Preferred Collection  Type->Urine, Clean Catch  Specimen Source->Urine   POCT GLUCOSE - Abnormal; Notable for the following components:    POCT Glucose 139 (*)     All other components within normal limits   LIPASE   URINALYSIS MICROSCOPIC    Narrative:     Preferred Collection Type->Urine, Clean Catch  Specimen Source->Urine          Imaging Results              CT Abdomen Pelvis  Without Contrast (Final result)  Result time 10/27/23 18:21:49      Final result by Kaylie Yousif MD (10/27/23 18:21:49)                   Impression:      Left kidney:5 mm partially obstructive stone in the left UVJ with resultant mild hydroureteronephrosis. 7 mm nonobstructive stone in the upper pole left kidney. Mild infectious or inflammatory fat stranding surrounding the left kidney.  Small cyst in the lower pole.    Mild circumferential bladder wall thickening, although collapsed.  Please correlate for cystitis.      Electronically signed by: Kaylie Yousif  Date:    10/27/2023  Time:    18:21               Narrative:    EXAMINATION:  CT ABDOMEN PELVIS WITHOUT CONTRAST    CLINICAL HISTORY:  Flank pain, kidney stone suspected;    TECHNIQUE:  Low dose axial images, sagittal and coronal reformations were obtained from the lung bases to the pubic symphysis.  Oral contrast was not administered.    COMPARISON:  Ultrasound 06/25/2020    FINDINGS:  Heart: Normal in size. No pericardial effusion.    Lung Bases: Well aerated, without consolidation or pleural fluid.    Liver: Fatty liver.  No focal hepatic lesion.    Gallbladder: No calcified gallstones.    Bile Ducts: No evidence of dilated ducts.    Pancreas: No mass or peripancreatic fat stranding.    Spleen: Unremarkable.    Adrenals: Unremarkable.    Kidneys/ Ureters: 5 mm partially obstructive stone in the left UVJ with resultant mild hydroureteronephrosis.  7 mm nonobstructive stone in the upper pole left kidney.  Mild infectious or inflammatory fat stranding surrounding the left kidney.  Small  cyst in the lower pole.    Small cyst in the lower pole right kidney.  Right kidney is otherwise unremarkable.    Bladder: Mild circumferential wall thickening, although collapsed.    Reproductive organs: Unremarkable.    GI Tract/Mesentery: No evidence of bowel obstruction or inflammation.  Colonic diverticulosis, without diverticulitis.    Peritoneal Space: No ascites. No free air.    Retroperitoneum: No significant adenopathy.    Abdominal wall: Unremarkable.    Vasculature: Mild multifocal atherosclerosis of the abdominal aorta and its branches.    Bones: Moderate pubic symphysis degeneration with possible osteitis pubis.  Grade 1 anterolisthesis at L5-S1 with chronic bilateral L5 pars fractures.                                       Medications   ketorolac injection 15 mg (15 mg Intravenous Given 10/27/23 1646)   ondansetron injection 4 mg (4 mg Intravenous Given 10/27/23 1646)   sodium chloride 0.9% bolus 1,000 mL 1,000 mL (0 mLs Intravenous Stopped 10/27/23 1757)     Medical Decision Making  58-year-old male with left flank pain.  Differentials include kidney stone, pyelonephritis, abdominal aortic aneurysm, constipation among others.  -VS notable for Blood pressure 151/98  -Overall sick appearing. No abnormal cardiopulmonary findings. Moderate left flank abdominal ttp. Some guarding, positive Caden sign.  Kidney stone seems possible given the exam and presentation. No prior history of kidney stones however. History and exam doesn't suggest AAA so no indication for CTA. No evidence of infection at this point so holding ABX for now. Could be a benign cause of pain although dangerous pathology needs to be ruled out first  -CT abd/pelv wo/contrast pending  -Abdominal pain labs, analgesics, fluids ordered  Case signed out to oncoming attending at shift change who will follow up pending workup and disposition           Amount and/or Complexity of Data Reviewed  External Data Reviewed: labs and radiology.  Labs:  ordered.  Radiology: ordered.    Risk  Prescription drug management.                   This patient was signed out to me at change of shift.  At the time of sign out, CT was pending.  Patient has a 5 mm ureterolithiasis has partially obstructive but no evidence of pyelonephritis or infected stone on urinalysis.  There is some stranding around the left kidney likely inflammatory in nature.  The patient's pain is improved after pain medications and stone is very distal and likely to pass spontaneously.  The patient will be given an outpatient referral to Urology, pain medications and antiemetics and given strict return precautions.            Clinical Impression:   Final diagnoses:  [R10.9] Left flank pain (Primary)  [N20.0] Kidney stone       ED Disposition Condition    Discharge Stable          ED Prescriptions       Medication Sig Dispense Start Date End Date Auth. Provider    ondansetron (ZOFRAN) 4 MG tablet Take 1 tablet (4 mg total) by mouth 3 (three) times daily as needed for Nausea. 20 tablet 10/27/2023 -- Melinda Prince MD    HYDROcodone-acetaminophen (NORCO) 5-325 mg per tablet Take 1 tablet by mouth every 6 (six) hours as needed for Pain. 12 tablet 10/27/2023 -- Melinda Prince MD          Follow-up Information       Follow up With Specialties Details Why Contact Info    Abigail Wang III, MD Internal Medicine, Cardiology In 1 week  [address] [facility]   [facility] [address] [phone]      Radha Long MD Urology Schedule an appointment as soon as possible for a visit in 1 week For a follow up appointment with Urology [address] [place] [address] [phone]               Melinda Prince MD  10/27/23 9352

## 2023-10-27 NOTE — DISCHARGE INSTRUCTIONS
Stay well hydrated, take pain medications as needed in addition to 600 mg of ibuprofen every 6-8 hours for pain.  Return to the emergency department for any fevers, large amount of blood in the urine, no improvement of symptoms in the next 2-3 days or any other concerns. please otherwise follow up with Urology as an outpatient

## 2023-10-30 ENCOUNTER — HOSPITAL ENCOUNTER (EMERGENCY)
Facility: HOSPITAL | Age: 58
Discharge: HOME | End: 2023-10-30
Attending: EMERGENCY MEDICINE
Payer: COMMERCIAL

## 2023-10-30 VITALS
WEIGHT: 240 LBS | RESPIRATION RATE: 18 BRPM | HEIGHT: 67 IN | OXYGEN SATURATION: 100 % | DIASTOLIC BLOOD PRESSURE: 108 MMHG | TEMPERATURE: 98 F | BODY MASS INDEX: 37.67 KG/M2 | SYSTOLIC BLOOD PRESSURE: 180 MMHG | HEART RATE: 80 BPM

## 2023-10-30 DIAGNOSIS — K59.00 CONSTIPATION, UNSPECIFIED CONSTIPATION TYPE: Primary | ICD-10-CM

## 2023-10-30 DIAGNOSIS — K59.00 CONSTIPATION: ICD-10-CM

## 2023-10-30 PROCEDURE — 74018 RADEX ABDOMEN 1 VIEW: CPT | Mod: TC

## 2023-10-30 PROCEDURE — 99283 EMERGENCY DEPT VISIT LOW MDM: CPT

## 2023-10-30 PROCEDURE — 74018 RADEX ABDOMEN 1 VIEW: CPT | Mod: 26,,, | Performed by: RADIOLOGY

## 2023-10-30 PROCEDURE — 74018 XR ABDOMEN AP 1 VIEW: ICD-10-PCS | Mod: 26,,, | Performed by: RADIOLOGY

## 2023-10-30 NOTE — ED NOTES
Enema about to begin.  Pt states he feels like he couse have a bowel movement  enema delay,  pt on commode.

## 2023-10-30 NOTE — ED PROVIDER NOTES
Encounter Date: 10/30/2023       History     Chief Complaint   Patient presents with    Abdominal Pain     Generalized abdominal pain    Constipation     No bowel movement x 2 days.. took a bottle of mag citrate at 2130 yesterday. Has been taking narcotic for kidney stones x 2 days.       Patient states he is not had a bowel movement in 3 days since being seen here in the emergency department for kidney stone.  Patient drank magnesium citrate at 9:00 p.m. last night approximately 4 hours ago without having a bowel movement at this point.  Patient states that he is not been able to pass gas today either.    The history is provided by the patient. No  was used.     Review of patient's allergies indicates:   Allergen Reactions    Lisinopril Other (See Comments)     cough    Losartan Other (See Comments)     cough     Past Medical History:   Diagnosis Date    COVID-19 02/2020    Diabetes mellitus     type 2    DVT (deep venous thrombosis) 2011    Approx. 2011, after having a bunion removed on left. pt was on warfarin for approx. 6 weeks. unsure of time    Hypertension     Sickle cell anemia trait     pt states all his life    Sleep apnea     USES CPAP    Wears glasses      Past Surgical History:   Procedure Laterality Date    ARTHROSCOPIC REPAIR OF ROTATOR CUFF OF SHOULDER Left 12/16/2021    Procedure: REPAIR, ROTATOR CUFF, ARTHROSCOPIC;  Surgeon: Ezekiel Schaefer MD;  Location: [facility] OR;  Service: Orthopedics;  Laterality: Left;    ARTHROSCOPIC TENOTOMY OF BICEPS TENDON Left 12/16/2021    Procedure: TENOTOMY, BICEPS, ARTHROSCOPIC;  Surgeon: Ezekiel Schaefer MD;  Location: [facility] OR;  Service: Orthopedics;  Laterality: Left;    ARTHROSCOPY OF SHOULDER WITH DECOMPRESSION OF SUBACROMIAL SPACE Left 12/16/2021    Procedure: ARTHROSCOPY, SHOULDER, WITH SUBACROMIAL SPACE DECOMPRESSION;  Surgeon: Ezekiel Schaefer MD;  Location: [facility] OR;  Service: Orthopedics;  Laterality: Left;  GENERAL AND BLOCK     BUNIONECTOMY Left     Pt developed a blood clot in leg, after having bunion removed. Had to do coumadin for approx. 6 weeks.    BUNIONECTOMY Left     complicated by dvt    COLONOSCOPY N/A 2016    Procedure: COLONOSCOPY;  Surgeon: Keegan Vo MD;  Location: [facility] ENDO;  Service: Endoscopy;  Laterality: N/A;    COLONOSCOPY N/A 2020    Procedure: COLONOSCOPY;  Surgeon: Keegan Vo MD;  Location: [facility] ENDO;  Service: Endoscopy;  Laterality: N/A;    EPIDURAL STEROID INJECTION  2019    lumbar    ESOPHAGOGASTRODUODENOSCOPY N/A 2020    Procedure: EGD (ESOPHAGOGASTRODUODENOSCOPY);  Surgeon: Keegan Vo MD;  Location: [facility] ENDO;  Service: Endoscopy;  Laterality: N/A;    INTRALUMINAL GASTROINTESTINAL TRACT IMAGING VIA CAPSULE N/A 2020    Procedure: IMAGING PROCEDURE, GI TRACT, INTRALUMINAL, VIA CAPSULE;  Surgeon: Keegan Vo MD;  Location: [facility] ENDO;  Service: Endoscopy;  Laterality: N/A;    JOINT REPLACEMENT      right    KNEE SURGERY Right     SHOULDER SURGERY Right     SHOULDER SURGERY Right     VASECTOMY Bilateral      Family History   Problem Relation Age of Onset    Heart block Mother     Kidney disease Mother     Cancer Paternal Aunt      Social History     Tobacco Use    Smoking status: Former     Current packs/day: 0.00     Average packs/day: 0.3 packs/day for 0.9 years (0.2 ttl pk-yrs)     Types: Cigarettes     Start date: 2000     Quit date: 2000     Years since quittin.9    Smokeless tobacco: Never    Tobacco comments:     less than a year, smoked during divorce   Substance Use Topics    Alcohol use: Never     Comment: Not anymore    Drug use: No     Review of Systems   Constitutional:  Negative for fever.   HENT:  Negative for sore throat.    Respiratory:  Negative for shortness of breath.    Cardiovascular:  Negative for chest pain.   Gastrointestinal:  Positive for abdominal distention and constipation. Negative for nausea.   Genitourinary:   Negative for dysuria.   Musculoskeletal:  Negative for back pain.   Skin:  Negative for rash.   Neurological:  Negative for weakness.   Hematological:  Does not bruise/bleed easily.   All other systems reviewed and are negative.      Physical Exam     Initial Vitals   BP Pulse Resp Temp SpO2   10/30/23 0129 10/30/23 0129 10/30/23 0129 10/30/23 0129 10/30/23 0128   (!) 180/108 80 18 98.4 °F (36.9 °C) 96 %      MAP       --                Physical Exam    Nursing note and vitals reviewed.  Constitutional: He appears well-developed and well-nourished.   HENT:   Head: Normocephalic and atraumatic.   Eyes: EOM are normal. Pupils are equal, round, and reactive to light.   Neck:   Normal range of motion.  Cardiovascular:  Normal rate and regular rhythm.           Pulmonary/Chest: Breath sounds normal. No respiratory distress.   Abdominal: Abdomen is soft. He exhibits distension. There is no abdominal tenderness. There is no rebound and no guarding.   Musculoskeletal:         General: Normal range of motion.      Cervical back: Normal range of motion.     Neurological: He is alert and oriented to person, place, and time. He has normal strength.   Skin: Skin is warm. Capillary refill takes less than 2 seconds.         ED Course   Procedures  Labs Reviewed - No data to display       Imaging Results              X-Ray Abdomen AP 1 View (KUB) (Final result)  Result time 10/30/23 02:06:11      Final result by Kaylie Yousif MD (10/30/23 02:06:11)                   Impression:      As above.      Electronically signed by: Kaylie Yousif  Date:    10/30/2023  Time:    02:06               Narrative:    EXAMINATION:  XR ABDOMEN AP 1 VIEW    CLINICAL HISTORY:  Constipation, unspecified    TECHNIQUE:  AP View(s) of the abdomen was performed.    COMPARISON:  None    FINDINGS:  No small bowel obstruction.  Mild to moderate stool throughout the colon and rectum.                                       Medications - No data to  display    Medical Decision Making  Differential diagnosis includes constipation, bowel obstruction    An enema produce positive results, patient had large bowel movement here in the emergency department feels better would like to go home.  Script for Metamucil written.    Amount and/or Complexity of Data Reviewed  Radiology: ordered. Decision-making details documented in ED Course.    Risk  OTC drugs.                               Clinical Impression:   Final diagnoses:  [K59.00] Constipation  [K59.00] Constipation, unspecified constipation type (Primary)        ED Disposition Condition    Discharge Stable          ED Prescriptions       Medication Sig Dispense Start Date End Date Auth. Provider    psyllium (HYDROCIL) packet Take 1 packet by mouth once daily. 30 packet 10/30/2023 -- Enriqueta Betancourt MD          Follow-up Information       Follow up With Specialties Details Why Contact Info    Abigail Wang III, MD Internal Medicine, Cardiology   [address] [facility] [place] [address] [phone]               Enriqueta Betancourt MD  10/30/23 0892

## 2023-11-14 ENCOUNTER — OFFICE VISIT (OUTPATIENT)
Dept: UROLOGY | Facility: CLINIC | Age: 58
End: 2023-11-14
Payer: COMMERCIAL

## 2023-11-14 VITALS
WEIGHT: 240.06 LBS | HEIGHT: 67 IN | DIASTOLIC BLOOD PRESSURE: 80 MMHG | BODY MASS INDEX: 37.68 KG/M2 | SYSTOLIC BLOOD PRESSURE: 134 MMHG | RESPIRATION RATE: 16 BRPM

## 2023-11-14 DIAGNOSIS — N20.0 KIDNEY STONE: ICD-10-CM

## 2023-11-14 DIAGNOSIS — N20.1 URETERAL STONE: Primary | ICD-10-CM

## 2023-11-14 PROCEDURE — 99999 PR PBB SHADOW E&M-EST. PATIENT-LVL V: CPT | Mod: PBBFAC,,, | Performed by: NURSE PRACTITIONER

## 2023-11-14 PROCEDURE — 99214 OFFICE O/P EST MOD 30 MIN: CPT | Mod: S$GLB,,, | Performed by: NURSE PRACTITIONER

## 2023-11-14 PROCEDURE — 1159F PR MEDICATION LIST DOCUMENTED IN MEDICAL RECORD: ICD-10-PCS | Mod: CPTII,S$GLB,, | Performed by: NURSE PRACTITIONER

## 2023-11-14 PROCEDURE — 3079F PR MOST RECENT DIASTOLIC BLOOD PRESSURE 80-89 MM HG: ICD-10-PCS | Mod: CPTII,S$GLB,, | Performed by: NURSE PRACTITIONER

## 2023-11-14 PROCEDURE — 3008F PR BODY MASS INDEX (BMI) DOCUMENTED: ICD-10-PCS | Mod: CPTII,S$GLB,, | Performed by: NURSE PRACTITIONER

## 2023-11-14 PROCEDURE — 3079F DIAST BP 80-89 MM HG: CPT | Mod: CPTII,S$GLB,, | Performed by: NURSE PRACTITIONER

## 2023-11-14 PROCEDURE — 1160F PR REVIEW ALL MEDS BY PRESCRIBER/CLIN PHARMACIST DOCUMENTED: ICD-10-PCS | Mod: CPTII,S$GLB,, | Performed by: NURSE PRACTITIONER

## 2023-11-14 PROCEDURE — 3052F PR MOST RECENT HEMOGLOBIN A1C LEVEL 8.0 - < 9.0%: ICD-10-PCS | Mod: CPTII,S$GLB,, | Performed by: NURSE PRACTITIONER

## 2023-11-14 PROCEDURE — 3008F BODY MASS INDEX DOCD: CPT | Mod: CPTII,S$GLB,, | Performed by: NURSE PRACTITIONER

## 2023-11-14 PROCEDURE — 1160F RVW MEDS BY RX/DR IN RCRD: CPT | Mod: CPTII,S$GLB,, | Performed by: NURSE PRACTITIONER

## 2023-11-14 PROCEDURE — 1159F MED LIST DOCD IN RCRD: CPT | Mod: CPTII,S$GLB,, | Performed by: NURSE PRACTITIONER

## 2023-11-14 PROCEDURE — 99214 PR OFFICE/OUTPT VISIT, EST, LEVL IV, 30-39 MIN: ICD-10-PCS | Mod: S$GLB,,, | Performed by: NURSE PRACTITIONER

## 2023-11-14 PROCEDURE — 99999 PR PBB SHADOW E&M-EST. PATIENT-LVL V: ICD-10-PCS | Mod: PBBFAC,,, | Performed by: NURSE PRACTITIONER

## 2023-11-14 PROCEDURE — 3075F SYST BP GE 130 - 139MM HG: CPT | Mod: CPTII,S$GLB,, | Performed by: NURSE PRACTITIONER

## 2023-11-14 PROCEDURE — 3052F HG A1C>EQUAL 8.0%<EQUAL 9.0%: CPT | Mod: CPTII,S$GLB,, | Performed by: NURSE PRACTITIONER

## 2023-11-14 PROCEDURE — 3075F PR MOST RECENT SYSTOLIC BLOOD PRESS GE 130-139MM HG: ICD-10-PCS | Mod: CPTII,S$GLB,, | Performed by: NURSE PRACTITIONER

## 2023-11-14 NOTE — PATIENT INSTRUCTIONS
-The patient was encouraged to drink 2-3 liters of water a day, limit iced tea and gerry as well as foods high in oxalate.  They were cautioned to try to limit salt and red meat intake. Low oxalate diet (limit spinach, rhubarb, nuts, beets, potatoes, chocolate).  No vitamin C supplements. We also discussed adding citrate to the diet with the addition of josephine or lemon juice to their water or alternatively with crystal light.      Get prompt medical attention if any of the following occur:  Severe pain that returns and not relieved by pain medicines  Repeated vomiting or unable to keep down fluids  Weakness, dizziness or fainting  Fever of 100.4ºF (38ºC) or higher, or as directed by your healthcare provider  Blood clots in urine  Foul smelling or cloudy urine  Unable to pass urine for 8 hours or increasing bladder pressure

## 2023-11-14 NOTE — PROGRESS NOTES
CHIEF COMPLAINT:    Mr. Garcia is a 58 y.o. male presenting for f/u ureteral stone  PRESENTING ILLNESS:    José Miguel Garcia is a 58 y.o. male who presents for f/u ureteral stone. This is his initial clinic visit.    11/14/23  Pt presented to ED 10/27/23 for left flank pain and nausea. Discharged home with zofran and norco. CBC WBC 7.12. Creatinine 1.5 / GFR 53. CT abd pelvis wo contrast: Left kidney:5 mm partially obstructive stone in the left UVJ with resultant mild hydroureteronephrosis. 7 mm nonobstructive stone in the upper pole left kidney. Mild infectious or inflammatory fat stranding surrounding the left kidney.  Small cyst in the lower pole.     Pt states he passed stone 10/31/23. Was not instructed to collect stone.    Today patient reports he continues to have mild low back discomfort but also has chronic low back pain so unsure if stone related. No voiding complaints. He has passed a few blood clots since passing stone but no issues starting urinary stream. Denies dysuria, flank pain, fever, chills, nausea or vomiting. No prior hx of kidney stones. He increased water intake. Decreased cola and tea intake.    9/19/23 PSA 0.23    REVIEW OF SYSTEMS:    Review of Systems    Constitutional: Negative for fever and chills.   HENT: Negative for hearing loss.   Eyes: Negative for visual disturbance.   Respiratory: Negative for shortness of breath.   Cardiovascular: Negative for chest pain.   Gastrointestinal: Negative for nausea, vomiting  Genitourinary:  See above  Neurological: Negative for dizziness.   Hematological: Does not bruise/bleed easily.   Psychiatric/Behavioral: Negative for confusion.     PATIENT HISTORY:    Past Medical History:   Diagnosis Date    COVID-19 02/2020    Diabetes mellitus     type 2    DVT (deep venous thrombosis) 2011    Approx. 2011, after having a bunion removed on left. pt was on warfarin for approx. 6 weeks. unsure of time    Hypertension     Sickle cell anemia trait     pt  states all his life    Sleep apnea     USES CPAP    Wears glasses        Past Surgical History:   Procedure Laterality Date    ARTHROSCOPIC REPAIR OF ROTATOR CUFF OF SHOULDER Left 12/16/2021    Procedure: REPAIR, ROTATOR CUFF, ARTHROSCOPIC;  Surgeon: Ezekiel Schaefer MD;  Location: [facility] OR;  Service: Orthopedics;  Laterality: Left;    ARTHROSCOPIC TENOTOMY OF BICEPS TENDON Left 12/16/2021    Procedure: TENOTOMY, BICEPS, ARTHROSCOPIC;  Surgeon: Ezekiel Schaefer MD;  Location: [facility] OR;  Service: Orthopedics;  Laterality: Left;    ARTHROSCOPY OF SHOULDER WITH DECOMPRESSION OF SUBACROMIAL SPACE Left 12/16/2021    Procedure: ARTHROSCOPY, SHOULDER, WITH SUBACROMIAL SPACE DECOMPRESSION;  Surgeon: Ezekiel Schaefer MD;  Location: [facility] OR;  Service: Orthopedics;  Laterality: Left;  GENERAL AND BLOCK    BUNIONECTOMY Left 2006    Pt developed a blood clot in leg, after having bunion removed. Had to do coumadin for approx. 6 weeks.    BUNIONECTOMY Left 2010    complicated by dvt    COLONOSCOPY N/A 11/17/2016    Procedure: COLONOSCOPY;  Surgeon: Keegan Vo MD;  Location: [facility] ENDO;  Service: Endoscopy;  Laterality: N/A;    COLONOSCOPY N/A 12/23/2020    Procedure: COLONOSCOPY;  Surgeon: Keegan Vo MD;  Location: [facility] ENDO;  Service: Endoscopy;  Laterality: N/A;    EPIDURAL STEROID INJECTION  2019    lumbar    ESOPHAGOGASTRODUODENOSCOPY N/A 12/23/2020    Procedure: EGD (ESOPHAGOGASTRODUODENOSCOPY);  Surgeon: Keegan Vo MD;  Location: [facility] ENDO;  Service: Endoscopy;  Laterality: N/A;    INTRALUMINAL GASTROINTESTINAL TRACT IMAGING VIA CAPSULE N/A 12/30/2020    Procedure: IMAGING PROCEDURE, GI TRACT, INTRALUMINAL, VIA CAPSULE;  Surgeon: Keegan Vo MD;  Location: [facility] ENDO;  Service: Endoscopy;  Laterality: N/A;    JOINT REPLACEMENT      right    KNEE SURGERY Right     SHOULDER SURGERY Right     SHOULDER SURGERY Right     VASECTOMY Bilateral 2011       Family History   Problem Relation Age of Onset     Heart block Mother     Kidney disease Mother     Cancer Paternal Aunt        Social History     Socioeconomic History    Marital status: Single   Tobacco Use    Smoking status: Former     Current packs/day: 0.00     Average packs/day: 0.3 packs/day for 0.9 years (0.2 ttl pk-yrs)     Types: Cigarettes     Start date: 2000     Quit date: 2000     Years since quittin.9    Smokeless tobacco: Never    Tobacco comments:     less than a year, smoked during divorce   Substance and Sexual Activity    Alcohol use: Never     Comment: Not anymore    Drug use: No    Sexual activity: Yes       Allergies:  Lisinopril and Losartan    Medications:    Current Outpatient Medications:     alcohol swabs (ALCOHOL PREP PADS) PadM, Apply 1 each topically 2 (two) times a day., Disp: 100 each, Rfl: 11    atorvastatin (LIPITOR) 20 MG tablet, TAKE 1 TABLET(20 MG) BY MOUTH EVERY DAY, Disp: 90 tablet, Rfl: 3    blood sugar diagnostic Strp, 1 strip by Misc.(Non-Drug; Combo Route) route 2 (two) times daily., Disp: 100 strip, Rfl: 11    cyclobenzaprine (FLEXERIL) 10 MG tablet, TAKE 1 TABLET BY MOUTH TWICE DAILY AS NEEDED FOR SPASMS, Disp: , Rfl:     ferrous sulfate (FEOSOL) 325 mg (65 mg iron) Tab tablet, Take 1 tablet (325 mg total) by mouth 4 (four) times a week., Disp: 90 tablet, Rfl: 3    fluticasone-umeclidin-vilanter (TRELEGY ELLIPTA) 200-62.5-25 mcg inhaler, Inhale 1 puff into the lungs once daily., Disp: 60 each, Rfl: 11    HYDROcodone-acetaminophen (NORCO) 5-325 mg per tablet, Take 1 tablet by mouth every 6 (six) hours as needed for Pain., Disp: 10 tablet, Rfl: 0    ketoconazole (NIZORAL) 2 % cream, APPLY TOPICALLY TO THE AFFECTED AREA TWICE DAILY, Disp: 60 g, Rfl: 2    lancets Misc, 1 each by Misc.(Non-Drug; Combo Route) route 2 (two) times daily., Disp: 100 each, Rfl: 11    meloxicam (MOBIC) 15 MG tablet, Take 15 mg by mouth daily as needed., Disp: , Rfl:     metFORMIN (GLUCOPHAGE) 1000 MG tablet, Take 1 tablet (1,000 mg  total) by mouth 2 (two) times daily with meals., Disp: 180 tablet, Rfl: 3    ondansetron (ZOFRAN) 4 MG tablet, Take 1 tablet (4 mg total) by mouth 3 (three) times daily as needed for Nausea., Disp: 20 tablet, Rfl: 0    pantoprazole (PROTONIX) 40 MG tablet, TAKE 1 TABLET(40 MG) BY MOUTH EVERY DAY, Disp: 90 tablet, Rfl: 3    psyllium (HYDROCIL) packet, Take 1 packet by mouth once daily., Disp: 30 packet, Rfl: 12    sucralfate (CARAFATE) 1 gram tablet, Take 1 g by mouth 4 (four) times daily., Disp: , Rfl:     TURMERIC ORAL, Take by mouth., Disp: , Rfl:     vilazodone (VIIBRYD) 20 mg Tab, TAKE 1 TABLET(20 MG) BY MOUTH EVERY DAY, Disp: 90 tablet, Rfl: 1    albuterol (PROVENTIL HFA) 90 mcg/actuation inhaler, Inhale 2 puffs into the lungs every 6 (six) hours as needed for Wheezing. Rescue, Disp: 18 g, Rfl: 1    albuterol-ipratropium (DUO-NEB) 2.5 mg-0.5 mg/3 mL nebulizer solution, Take 3 mLs by nebulization every 6 (six) hours as needed for Wheezing or Shortness of Breath. Rescue, Disp: 1 Box, Rfl: 11    lancing device (BD LANCET DEVICE) Misc, 1 each by Misc.(Non-Drug; Combo Route) route 2 (two) times daily., Disp: 1 each, Rfl: 1    PHYSICAL EXAMINATION:    Constitutional: He is oriented to person, place, and time. He appears well-developed and well-nourished.  He is in no apparent distress.    Neck: Normal ROM.     Cardiovascular: Normal rate.      Pulmonary/Chest: Effort normal. No respiratory distress.     Abdominal:  He exhibits no distension.  There is no CVA tenderness.     Neurological: He is alert and oriented to person, place, and time.     Skin: Skin is warm and dry.     Psych: Cooperative with normal affect.      Physical Exam    LABS:    Lab Results   Component Value Date    PSADIAG 0.23 09/19/2023    PSADIAG 0.16 08/17/2022    PSADIAG 0.2 10/13/2020     Lab Results   Component Value Date    CREATININE 1.5 (H) 10/27/2023       IMPRESSION:    Encounter Diagnoses   Name Primary?    Ureteral stone Yes    Kidney  stone        PLAN:  -CT RSS ordered and scheduled to verify passage of ureteral stone.  -BMP ordered and scheduled  -Will call with results    -Will continue to monitor nonobstructive renal stone. If stable in repeat CT, plan for MAGGIE in 6 months    -General risk factors for kidney stones and the conservative measures to prevent kidney stones in the future were discussed with the patient in detail.  The patient was encouraged to drink 2-3 liters of water a day, limit iced tea and gerry as well as foods high in oxalate.  They were cautioned to try to limit salt and red meat intake. Low oxalate diet (limit spinach, rhubarb, nuts, beets, potatoes, chocolate).  No vitamin C supplements. We also discussed adding citrate to the diet with the addition of josephine or lemon juice to their water or alternatively with crystal light.     -Get prompt medical attention if any of the following occur:  Severe pain that returns and not relieved by pain medicines  Repeated vomiting or unable to keep down fluids  Weakness, dizziness or fainting  Fever of 100.4ºF (38ºC) or higher, or as directed by your healthcare provider  Blood clots in urine  Foul smelling or cloudy urine  Unable to pass urine for 8 hours or increasing bladder pressure      -RTC 6 months     I encouraged him or any of his family members to call or email me with questions and/or concerns.      45 minutes of total time spent on the encounter, which includes face to face time and non-face to face time preparing to see the patient (eg, review of tests), Obtaining and/or reviewing separately obtained history, Documenting clinical information in the electronic or other health record, Independently interpreting results (not separately reported) and communicating results to the patient/family/caregiver, or Care coordination (not separately reported).

## 2023-11-16 NOTE — TELEPHONE ENCOUNTER
----- Message from Ankit Maxwell sent at 2/15/2022  2:16 PM CST -----  Regarding: needs PT orders sent to a different provider, call pt   Contact: pt   needs PT orders sent to a different provider, call pt      Interpolation Flap Text: A decision was made to reconstruct the defect utilizing an interpolation axial flap and a staged reconstruction.  A telfa template was made of the defect.  This telfa template was then used to outline the interpolation flap.  The donor area for the pedicle flap was then injected with anesthesia.  The flap was excised through the skin and subcutaneous tissue down to the layer of the underlying musculature.  The interpolation flap was carefully excised within this deep plane to maintain its blood supply.  The edges of the donor site were undermined.   The donor site was closed in a primary fashion.  The pedicle was then rotated into position and sutured.  Once the tube was sutured into place, adequate blood supply was confirmed with blanching and refill.  The pedicle was then wrapped with xeroform gauze and dressed appropriately with a telfa and gauze bandage to ensure continued blood supply and protect the attached pedicle.

## 2023-11-20 DIAGNOSIS — N20.1 URETERAL STONE: Primary | ICD-10-CM

## 2023-12-04 ENCOUNTER — TELEPHONE (OUTPATIENT)
Dept: UROLOGY | Facility: CLINIC | Age: 58
End: 2023-12-04
Payer: COMMERCIAL

## 2024-06-13 DIAGNOSIS — I71.20 THORACIC AORTIC ANEURYSM WITHOUT RUPTURE, UNSPECIFIED PART: Primary | ICD-10-CM

## 2024-06-18 ENCOUNTER — HOSPITAL ENCOUNTER (OUTPATIENT)
Dept: RADIOLOGY | Facility: HOSPITAL | Age: 59
Discharge: HOME | End: 2024-06-18
Attending: THORACIC SURGERY (CARDIOTHORACIC VASCULAR SURGERY)
Payer: MEDICARE

## 2024-06-18 DIAGNOSIS — I71.20 THORACIC AORTIC ANEURYSM WITHOUT RUPTURE, UNSPECIFIED PART: ICD-10-CM

## 2024-06-18 DIAGNOSIS — I71.21 ANEURYSM OF ASCENDING AORTA WITHOUT RUPTURE: ICD-10-CM

## 2024-06-18 DIAGNOSIS — I71.20 THORACIC AORTIC ANEURYSM WITHOUT RUPTURE, UNSPECIFIED PART: Primary | ICD-10-CM

## 2024-06-18 PROCEDURE — 71250 CT THORAX DX C-: CPT | Mod: 26,,, | Performed by: RADIOLOGY

## 2024-06-18 PROCEDURE — 71250 CT THORAX DX C-: CPT | Mod: TC

## 2024-08-22 ENCOUNTER — LAB VISIT (OUTPATIENT)
Dept: LAB | Facility: HOSPITAL | Age: 59
End: 2024-08-22
Attending: NURSE PRACTITIONER
Payer: MEDICARE

## 2024-08-22 DIAGNOSIS — N20.1 URETERAL STONE: ICD-10-CM

## 2024-08-22 DIAGNOSIS — I71.20 THORACIC AORTIC ANEURYSM WITHOUT RUPTURE, UNSPECIFIED PART: ICD-10-CM

## 2024-08-22 DIAGNOSIS — E11.9 DIABETES MELLITUS WITHOUT COMPLICATION: ICD-10-CM

## 2024-08-22 DIAGNOSIS — I10 ESSENTIAL HYPERTENSION, MALIGNANT: Primary | ICD-10-CM

## 2024-08-22 DIAGNOSIS — E78.5 HYPERLIPEMIA: ICD-10-CM

## 2024-08-22 LAB
ALBUMIN SERPL BCP-MCNC: 4.1 G/DL (ref 3.5–5.2)
ALP SERPL-CCNC: 55 U/L (ref 55–135)
ALT SERPL W/O P-5'-P-CCNC: 10 U/L (ref 10–44)
ANION GAP SERPL CALC-SCNC: 12 MMOL/L (ref 8–16)
ANION GAP SERPL CALC-SCNC: 12 MMOL/L (ref 8–16)
AST SERPL-CCNC: 18 U/L (ref 10–40)
BILIRUB SERPL-MCNC: 0.6 MG/DL (ref 0.1–1)
BUN SERPL-MCNC: 14 MG/DL (ref 8–23)
BUN SERPL-MCNC: 14 MG/DL (ref 8–23)
CALCIUM SERPL-MCNC: 9.5 MG/DL (ref 8.7–10.5)
CALCIUM SERPL-MCNC: 9.5 MG/DL (ref 8.7–10.5)
CHLORIDE SERPL-SCNC: 106 MMOL/L (ref 95–110)
CHLORIDE SERPL-SCNC: 106 MMOL/L (ref 95–110)
CHOLEST SERPL-MCNC: 111 MG/DL (ref 120–199)
CHOLEST/HDLC SERPL: 3.7 {RATIO} (ref 2–5)
CO2 SERPL-SCNC: 25 MMOL/L (ref 23–29)
CO2 SERPL-SCNC: 25 MMOL/L (ref 23–29)
CREAT SERPL-MCNC: 1.3 MG/DL (ref 0.5–1.4)
ERYTHROCYTE [DISTWIDTH] IN BLOOD BY AUTOMATED COUNT: 15 % (ref 11.5–14.5)
EST. GFR  (NO RACE VARIABLE): >60 ML/MIN/1.73 M^2
ESTIMATED AVG GLUCOSE: 157 MG/DL (ref 68–131)
GLUCOSE SERPL-MCNC: 109 MG/DL (ref 70–110)
GLUCOSE SERPL-MCNC: 109 MG/DL (ref 70–110)
HBA1C MFR BLD: 7.1 % (ref 4–5.6)
HCT VFR BLD AUTO: 38.2 % (ref 40–54)
HDLC SERPL-MCNC: 30 MG/DL (ref 40–75)
HDLC SERPL: 27 % (ref 20–50)
HGB BLD-MCNC: 12.5 G/DL (ref 14–18)
LDLC SERPL CALC-MCNC: 67.4 MG/DL (ref 63–159)
MCH RBC QN AUTO: 27.9 PG (ref 27–31)
MCHC RBC AUTO-ENTMCNC: 32.7 G/DL (ref 32–36)
MCV RBC AUTO: 85 FL (ref 82–98)
NONHDLC SERPL-MCNC: 81 MG/DL
PLATELET # BLD AUTO: 206 K/UL (ref 150–450)
PMV BLD AUTO: 9.9 FL (ref 9.2–12.9)
POTASSIUM SERPL-SCNC: 4.2 MMOL/L (ref 3.5–5.1)
POTASSIUM SERPL-SCNC: 4.2 MMOL/L (ref 3.5–5.1)
PROT SERPL-MCNC: 7.3 G/DL (ref 6–8.4)
RBC # BLD AUTO: 4.48 M/UL (ref 4.6–6.2)
SODIUM SERPL-SCNC: 143 MMOL/L (ref 136–145)
SODIUM SERPL-SCNC: 143 MMOL/L (ref 136–145)
TRIGL SERPL-MCNC: 68 MG/DL (ref 30–150)
TSH SERPL DL<=0.005 MIU/L-ACNC: 0.82 UIU/ML (ref 0.4–4)
WBC # BLD AUTO: 5.35 K/UL (ref 3.9–12.7)

## 2024-08-22 PROCEDURE — 36415 COLL VENOUS BLD VENIPUNCTURE: CPT | Performed by: NURSE PRACTITIONER

## 2024-08-22 PROCEDURE — 85027 COMPLETE CBC AUTOMATED: CPT | Performed by: NURSE PRACTITIONER

## 2024-08-22 PROCEDURE — 80053 COMPREHEN METABOLIC PANEL: CPT | Performed by: NURSE PRACTITIONER

## 2024-08-22 PROCEDURE — 83036 HEMOGLOBIN GLYCOSYLATED A1C: CPT | Performed by: NURSE PRACTITIONER

## 2024-08-22 PROCEDURE — 80061 LIPID PANEL: CPT | Performed by: NURSE PRACTITIONER

## 2024-08-22 PROCEDURE — 84443 ASSAY THYROID STIM HORMONE: CPT | Performed by: NURSE PRACTITIONER

## 2024-12-16 ENCOUNTER — HOSPITAL ENCOUNTER (OUTPATIENT)
Dept: RADIOLOGY | Facility: HOSPITAL | Age: 59
Discharge: HOME | End: 2024-12-16
Attending: THORACIC SURGERY (CARDIOTHORACIC VASCULAR SURGERY)
Payer: MEDICARE

## 2024-12-16 DIAGNOSIS — I71.21 ANEURYSM OF ASCENDING AORTA WITHOUT RUPTURE: ICD-10-CM

## 2024-12-16 DIAGNOSIS — I71.20 THORACIC AORTIC ANEURYSM WITHOUT RUPTURE, UNSPECIFIED PART: ICD-10-CM

## 2024-12-16 PROCEDURE — 71250 CT THORAX DX C-: CPT | Mod: TC

## 2024-12-16 PROCEDURE — 71250 CT THORAX DX C-: CPT | Mod: 26,,, | Performed by: RADIOLOGY

## 2024-12-24 ENCOUNTER — OFFICE VISIT (OUTPATIENT)
Dept: CARDIOLOGY | Facility: CLINIC | Age: 59
End: 2024-12-24
Payer: MEDICARE

## 2024-12-24 ENCOUNTER — TELEPHONE (OUTPATIENT)
Dept: CARDIOLOGY | Facility: CLINIC | Age: 59
End: 2024-12-24

## 2024-12-24 VITALS
DIASTOLIC BLOOD PRESSURE: 81 MMHG | BODY MASS INDEX: 36.34 KG/M2 | HEIGHT: 67 IN | WEIGHT: 231.5 LBS | SYSTOLIC BLOOD PRESSURE: 126 MMHG | HEART RATE: 66 BPM

## 2024-12-24 DIAGNOSIS — Z01.810 PREOP CARDIOVASCULAR EXAM: ICD-10-CM

## 2024-12-24 DIAGNOSIS — I71.21 ANEURYSM OF ASCENDING AORTA WITHOUT RUPTURE: Primary | ICD-10-CM

## 2024-12-24 DIAGNOSIS — R07.9 CHEST PAIN, UNSPECIFIED TYPE: ICD-10-CM

## 2024-12-24 DIAGNOSIS — E78.5 HYPERLIPIDEMIA, UNSPECIFIED HYPERLIPIDEMIA TYPE: ICD-10-CM

## 2024-12-24 PROCEDURE — 99205 OFFICE O/P NEW HI 60 MIN: CPT | Mod: S$GLB,,, | Performed by: INTERNAL MEDICINE

## 2024-12-24 PROCEDURE — 3074F SYST BP LT 130 MM HG: CPT | Mod: CPTII,S$GLB,, | Performed by: INTERNAL MEDICINE

## 2024-12-24 PROCEDURE — 1160F RVW MEDS BY RX/DR IN RCRD: CPT | Mod: CPTII,S$GLB,, | Performed by: INTERNAL MEDICINE

## 2024-12-24 PROCEDURE — 3051F HG A1C>EQUAL 7.0%<8.0%: CPT | Mod: CPTII,S$GLB,, | Performed by: INTERNAL MEDICINE

## 2024-12-24 PROCEDURE — 3008F BODY MASS INDEX DOCD: CPT | Mod: CPTII,S$GLB,, | Performed by: INTERNAL MEDICINE

## 2024-12-24 PROCEDURE — 99999 PR PBB SHADOW E&M-EST. PATIENT-LVL IV: CPT | Mod: PBBFAC,,, | Performed by: INTERNAL MEDICINE

## 2024-12-24 PROCEDURE — 1159F MED LIST DOCD IN RCRD: CPT | Mod: CPTII,S$GLB,, | Performed by: INTERNAL MEDICINE

## 2024-12-24 PROCEDURE — 3079F DIAST BP 80-89 MM HG: CPT | Mod: CPTII,S$GLB,, | Performed by: INTERNAL MEDICINE

## 2024-12-24 RX ORDER — SODIUM CHLORIDE 9 MG/ML
INJECTION, SOLUTION INTRAVENOUS ONCE
OUTPATIENT
Start: 2025-01-09

## 2024-12-24 NOTE — H&P (VIEW-ONLY)
Subjective:    Patient ID:  José Miguel Garcia is a 59 y.o. male patient here for evaluation aortic aneurysm      History of Present Illness:     59-year-old male with past medical history of diabetes, dyslipidemia, aortic aneurysm referred here for possible heart catheterization.  Patient has aortic aneurysm in the ascending aorta which is slowly growing.  Recent measurement up to 5.2 cm.  Has occasional random episodes of chest tightness which is unrelated to exertion.  Otherwise denies exertional anginal symptoms.  Denies any history of heart disease in the past.  Denies any family history of aneurysms.  Blood pressure well controlled at home.        Review of patient's allergies indicates:   Allergen Reactions    Lisinopril Other (See Comments)     cough    Losartan Other (See Comments)     cough       Past Medical History:   Diagnosis Date    COVID-19 02/2020    Diabetes mellitus     type 2    DVT (deep venous thrombosis) 2011    Approx. 2011, after having a bunion removed on left. pt was on warfarin for approx. 6 weeks. unsure of time    Hypertension     Sickle cell anemia trait     pt states all his life    Sleep apnea     USES CPAP    Wears glasses      Past Surgical History:   Procedure Laterality Date    ARTHROSCOPIC REPAIR OF ROTATOR CUFF OF SHOULDER Left 12/16/2021    Procedure: REPAIR, ROTATOR CUFF, ARTHROSCOPIC;  Surgeon: Ezekiel Schaefer MD;  Location: [facility] OR;  Service: Orthopedics;  Laterality: Left;    ARTHROSCOPIC TENOTOMY OF BICEPS TENDON Left 12/16/2021    Procedure: TENOTOMY, BICEPS, ARTHROSCOPIC;  Surgeon: Ezekiel Schaefer MD;  Location: [facility] OR;  Service: Orthopedics;  Laterality: Left;    ARTHROSCOPY OF SHOULDER WITH DECOMPRESSION OF SUBACROMIAL SPACE Left 12/16/2021    Procedure: ARTHROSCOPY, SHOULDER, WITH SUBACROMIAL SPACE DECOMPRESSION;  Surgeon: Ezekiel Schaefer MD;  Location: [facility] OR;  Service: Orthopedics;  Laterality: Left;  GENERAL AND BLOCK    BUNIONECTOMY Left 2006    Pt  developed a blood clot in leg, after having bunion removed. Had to do coumadin for approx. 6 weeks.    BUNIONECTOMY Left 2010    complicated by dvt    COLONOSCOPY N/A 2016    Procedure: COLONOSCOPY;  Surgeon: Keegan Vo MD;  Location: [facility] ENDO;  Service: Endoscopy;  Laterality: N/A;    COLONOSCOPY N/A 2020    Procedure: COLONOSCOPY;  Surgeon: Keegan Vo MD;  Location: [facility] ENDO;  Service: Endoscopy;  Laterality: N/A;    EPIDURAL STEROID INJECTION  2019    lumbar    ESOPHAGOGASTRODUODENOSCOPY N/A 2020    Procedure: EGD (ESOPHAGOGASTRODUODENOSCOPY);  Surgeon: Keegan Vo MD;  Location: [facility] ENDO;  Service: Endoscopy;  Laterality: N/A;    INTRALUMINAL GASTROINTESTINAL TRACT IMAGING VIA CAPSULE N/A 2020    Procedure: IMAGING PROCEDURE, GI TRACT, INTRALUMINAL, VIA CAPSULE;  Surgeon: Keegan Vo MD;  Location: [facility] ENDO;  Service: Endoscopy;  Laterality: N/A;    JOINT REPLACEMENT      right    KNEE SURGERY Right     SHOULDER SURGERY Right     SHOULDER SURGERY Right     VASECTOMY Bilateral      Social History     Tobacco Use    Smoking status: Former     Current packs/day: 0.00     Average packs/day: 0.3 packs/day for 0.9 years (0.2 ttl pk-yrs)     Types: Cigarettes     Start date: 2000     Quit date: 2000     Years since quittin.0    Smokeless tobacco: Never    Tobacco comments:     less than a year, smoked during divorce   Substance Use Topics    Alcohol use: Never     Comment: Not anymore    Drug use: No        Review of Systems   Negative except as mentioned in HPI         Objective        Vitals:    24 1027   BP: 126/81   Pulse: 66       LIPIDS - LAST 2   Lab Results   Component Value Date    CHOL 111 (L) 2024    CHOL 130 2024    HDL 30 (L) 2024    HDL 38 (L) 2024    LDLCALC 67.4 2024    LDLCALC 69 2024    TRIG 68 2024    TRIG 145 2024    CHOLHDL 27.0 2024    CHOLHDL 3.4 2024       CBC -  LAST 2  Lab Results   Component Value Date    WBC 5.35 08/22/2024    WBC 4.7 01/30/2024    RBC 4.48 (L) 08/22/2024    RBC 4.05 (L) 01/30/2024    HGB 12.5 (L) 08/22/2024    HGB 11.5 (L) 01/30/2024    HCT 38.2 (L) 08/22/2024    HCT 34.9 (L) 01/30/2024    MCV 85 08/22/2024    MCV 86.2 01/30/2024    MCH 27.9 08/22/2024    MCH 28.4 01/30/2024    MCHC 32.7 08/22/2024    MCHC 33.0 01/30/2024    RDW 15.0 (H) 08/22/2024    RDW 15.3 (H) 01/30/2024     08/22/2024     01/30/2024    MPV 9.9 08/22/2024    MPV 10.9 01/30/2024    GRAN 4.9 10/27/2023    GRAN 68.3 10/27/2023    LYMPH 1,109 01/30/2024    LYMPH 23.6 01/30/2024    MONO 324 01/30/2024    MONO 6.9 01/30/2024    BASO 19 01/30/2024    BASO 0.04 10/27/2023    NRBC 0 10/27/2023    NRBC 0 02/08/2021       CHEMISTRY & LIVER FUNCTION - LAST 2  Lab Results   Component Value Date     08/22/2024     08/22/2024    K 4.2 08/22/2024    K 4.2 08/22/2024     08/22/2024     08/22/2024    CO2 25 08/22/2024    CO2 25 08/22/2024    ANIONGAP 12 08/22/2024    ANIONGAP 12 08/22/2024    BUN 14 08/22/2024    BUN 14 08/22/2024    CREATININE 1.3 08/22/2024    CREATININE 1.3 08/22/2024    CREATININE 1.3 08/22/2024     08/22/2024     08/22/2024    CALCIUM 9.5 08/22/2024    CALCIUM 9.5 08/22/2024    MG 1.9 01/30/2024    MG 2.0 09/19/2023    ALBUMIN 4.1 08/22/2024    ALBUMIN 4.4 01/30/2024    PROT 7.3 08/22/2024    PROT 7.1 01/30/2024    ALKPHOS 55 08/22/2024    ALKPHOS 69 10/27/2023    ALT 10 08/22/2024    ALT 13 01/30/2024    AST 18 08/22/2024    AST 14 01/30/2024    BILITOT 0.6 08/22/2024    BILITOT 0.5 01/30/2024        CARDIAC PROFILE - LAST 2  Lab Results   Component Value Date    BNP 11 01/30/2024    BNP 7 12/08/2022     11/21/2019    TROPONINI 0.01 (L) 01/27/2021        COAGULATION - LAST 2  Lab Results   Component Value Date    INR 1.1 01/27/2021    INR 1.0 07/02/2020    APTT 23.3 07/02/2020       ENDOCRINE & PSA - LAST 2  Lab Results    Component Value Date    HGBA1C 7.1 (H) 08/22/2024    HGBA1C 9.0 (H) 01/30/2024    MICROALBUR 11.5 12/08/2022    MICROALBUR 3.4 10/13/2020    TSH 0.817 08/22/2024    TSH 0.88 01/30/2024    PROCAL 0.23 01/27/2021        ECHOCARDIOGRAM RESULTS  Results for orders placed during the hospital encounter of 08/09/21    Stress Echo Which stress agent will be used? Treadmill Exercise; Color Flow Doppler? No    Interpretation Summary  · Concentric hypertrophy and normal systolic function.  · The estimated ejection fraction is 61%.  · Normal left ventricular diastolic function.  · The patient's exercise capacity was normal. Achieved 9 METS.  · The patient reached the end of the protocol.  · There were no arrhythmias during stress.  · Mild right ventricular enlargement.  · Mild right atrial enlargement.  · The stress echo portion of this study is negative for myocardial ischemia.  · The ECG portion of this study is negative for myocardial ischemia.  · Moderate left atrial enlargement.  · Patient reported shortness of breath during stress. Shortness of breath reported by the patient was mild.      CURRENT/PREVIOUS VISIT EKG  Results for orders placed or performed during the hospital encounter of 07/08/21   EKG 12-lead    Collection Time: 07/08/21  9:55 AM    Narrative    Test Reason : M75.102,M12.812,    Vent. Rate : 095 BPM     Atrial Rate : 095 BPM     P-R Int : 164 ms          QRS Dur : 100 ms      QT Int : 362 ms       P-R-T Axes : 051 006 044 degrees     QTc Int : 454 ms    Normal sinus rhythm  Normal ECG  When compared with ECG of 27-JAN-2021 11:05,  Nonspecific T wave abnormality, improved in Anterior leads  Confirmed by Cathy WALTERS, Gage HARMAN ([id number]) on 7/8/2021 4:10:51 PM    Referred By: [email or web address]   SELF           Confirmed By:Gage Morgan MD     No valid procedures specified.   Results for orders placed during the hospital encounter of 09/18/19    Nuclear Stress - Cardiology Interpreted    Interpretation Summary     The perfusion scan is free of evidence from myocardial ischemia or injury.    There is mild to moderate  apical thinning which is a normal variant.    Gated perfusion images showed an ejection fraction of 42.0 % at rest and 43 % post stress. Normal is 53% - 65%.    There is moderate hypokinesis at rest and stress.    LV cavity size is normal at rest and normal at stress.    The EKG portion of this study is negative for ischemia.    There were no arrhythmias during stress.    The patient reported no chest pain during the stress test.    No valid procedures specified.          PREVIOUS STRESS TEST              PREVIOUS ANGIOGRAM        PHYSICAL EXAM    CONSTITUTIONAL: Well built, well nourished in no apparent distress  HEENT: No pallor  NECK: no JVD  LUNGS: CTA b/l  HEART: regular rate and rhythm, S1, S2 normal, no murmur   ABDOMEN:  Nondistended  EXTREMITIES: No edema  NEURO: AAO X 3   SKIN:  No rash  Psych:  Normal affect    I HAVE REVIEWED :    The vital signs, nurses notes, and all the pertinent radiology and labs.        Current Outpatient Medications   Medication Instructions    albuterol (PROVENTIL HFA) 90 mcg/actuation inhaler 2 puffs, Inhalation, Every 6 hours PRN, Rescue    albuterol-ipratropium (DUO-NEB) 2.5 mg-0.5 mg/3 mL nebulizer solution 3 mLs, Nebulization, Every 6 hours PRN, Rescue    alcohol swabs (ALCOHOL PREP PADS) PadM 1 each, Topical (Top), 2 times daily    atorvastatin (LIPITOR) 20 mg, Oral    blood sugar diagnostic (TRUE METRIX GLUCOSE TEST STRIP) Strp 1 strip, Subcutaneous, 2 times daily    blood-glucose meter (TRUE METRIX AIR GLUCOSE METER) Misc 1 each, Subcutaneous, 2 times daily    cyclobenzaprine (FLEXERIL) 10 MG tablet TAKE 1 TABLET BY MOUTH TWICE DAILY AS NEEDED FOR SPASMS    ferrous sulfate (FEOSOL) 325 mg, Oral, Four times weekly    fluticasone-umeclidin-vilanter (TRELEGY ELLIPTA) 200-62.5-25 mcg inhaler 1 puff, Inhalation, Daily    HYDROcodone-acetaminophen (NORCO) 5-325 mg per tablet  1 tablet, Oral, Every 6 hours PRN    ketoconazole (NIZORAL) 2 % cream APPLY TOPICALLY TO THE AFFECTED AREA TWICE DAILY    lancets Misc 1 each, Misc.(Non-Drug; Combo Route), 2 times daily    lancing device (BD LANCET DEVICE) Misc 1 each, Misc.(Non-Drug; Combo Route), 2 times daily    meloxicam (MOBIC) 15 mg, Oral, Daily PRN    metFORMIN (GLUCOPHAGE) 1,000 mg, Oral, 2 times daily with meals    ondansetron (ZOFRAN) 4 mg, Oral, 3 times daily PRN    pantoprazole (PROTONIX) 40 MG tablet TAKE 1 TABLET(40 MG) BY MOUTH EVERY DAY    psyllium (HYDROCIL) packet 1 packet, Oral, Daily    RYBELSUS 7 mg, Oral, Daily    TURMERIC ORAL Oral        ECG reviewed by me:  Normal sinus rhythm, nonspecific T-wave abnormality  Assessment & Plan     59-year-old male with past medical history of diabetes, dyslipidemia, aortic aneurysm referred here for possible heart catheterization.  Patient has aortic aneurysm in the ascending aorta which is slowly growing.  Recent measurement up to 5.2 cm.  Has occasional random episodes of chest tightness which is unrelated to exertion.  Otherwise denies exertional anginal symptoms.  Denies any history of heart disease in the past.  Denies any family history of aneurysms.  Blood pressure well controlled at home.    Discussed with Dr. Aaron over the phone who recommended cardiac catheterization for preop for aneurysm repair surgery in near future.  Discussed with the patient regarding coronary angiogram.  Benefits and risks discussed.  Patient agreed to undergo the procedure.  Angiogram scheduled on 01/09/2025.  Continue statin  Continue home BP monitoring  Obtain echocardiogram

## 2024-12-24 NOTE — PROGRESS NOTES
Subjective:    Patient ID:  José Miguel Garcia is a 59 y.o. male patient here for evaluation aortic aneurysm      History of Present Illness:     59-year-old male with past medical history of diabetes, dyslipidemia, aortic aneurysm referred here for possible heart catheterization.  Patient has aortic aneurysm in the ascending aorta which is slowly growing.  Recent measurement up to 5.2 cm.  Has occasional random episodes of chest tightness which is unrelated to exertion.  Otherwise denies exertional anginal symptoms.  Denies any history of heart disease in the past.  Denies any family history of aneurysms.  Blood pressure well controlled at home.        Review of patient's allergies indicates:   Allergen Reactions    Lisinopril Other (See Comments)     cough    Losartan Other (See Comments)     cough       Past Medical History:   Diagnosis Date    COVID-19 02/2020    Diabetes mellitus     type 2    DVT (deep venous thrombosis) 2011    Approx. 2011, after having a bunion removed on left. pt was on warfarin for approx. 6 weeks. unsure of time    Hypertension     Sickle cell anemia trait     pt states all his life    Sleep apnea     USES CPAP    Wears glasses      Past Surgical History:   Procedure Laterality Date    ARTHROSCOPIC REPAIR OF ROTATOR CUFF OF SHOULDER Left 12/16/2021    Procedure: REPAIR, ROTATOR CUFF, ARTHROSCOPIC;  Surgeon: Ezekiel Schaefer MD;  Location: [facility] OR;  Service: Orthopedics;  Laterality: Left;    ARTHROSCOPIC TENOTOMY OF BICEPS TENDON Left 12/16/2021    Procedure: TENOTOMY, BICEPS, ARTHROSCOPIC;  Surgeon: Ezekiel Schaefer MD;  Location: [facility] OR;  Service: Orthopedics;  Laterality: Left;    ARTHROSCOPY OF SHOULDER WITH DECOMPRESSION OF SUBACROMIAL SPACE Left 12/16/2021    Procedure: ARTHROSCOPY, SHOULDER, WITH SUBACROMIAL SPACE DECOMPRESSION;  Surgeon: Ezekiel Schaefer MD;  Location: [facility] OR;  Service: Orthopedics;  Laterality: Left;  GENERAL AND BLOCK    BUNIONECTOMY Left 2006    Pt  developed a blood clot in leg, after having bunion removed. Had to do coumadin for approx. 6 weeks.    BUNIONECTOMY Left 2010    complicated by dvt    COLONOSCOPY N/A 2016    Procedure: COLONOSCOPY;  Surgeon: Keegan Vo MD;  Location: [facility] ENDO;  Service: Endoscopy;  Laterality: N/A;    COLONOSCOPY N/A 2020    Procedure: COLONOSCOPY;  Surgeon: Keegan Vo MD;  Location: [facility] ENDO;  Service: Endoscopy;  Laterality: N/A;    EPIDURAL STEROID INJECTION  2019    lumbar    ESOPHAGOGASTRODUODENOSCOPY N/A 2020    Procedure: EGD (ESOPHAGOGASTRODUODENOSCOPY);  Surgeon: Keegan Vo MD;  Location: [facility] ENDO;  Service: Endoscopy;  Laterality: N/A;    INTRALUMINAL GASTROINTESTINAL TRACT IMAGING VIA CAPSULE N/A 2020    Procedure: IMAGING PROCEDURE, GI TRACT, INTRALUMINAL, VIA CAPSULE;  Surgeon: Keegan Vo MD;  Location: [facility] ENDO;  Service: Endoscopy;  Laterality: N/A;    JOINT REPLACEMENT      right    KNEE SURGERY Right     SHOULDER SURGERY Right     SHOULDER SURGERY Right     VASECTOMY Bilateral      Social History     Tobacco Use    Smoking status: Former     Current packs/day: 0.00     Average packs/day: 0.3 packs/day for 0.9 years (0.2 ttl pk-yrs)     Types: Cigarettes     Start date: 2000     Quit date: 2000     Years since quittin.0    Smokeless tobacco: Never    Tobacco comments:     less than a year, smoked during divorce   Substance Use Topics    Alcohol use: Never     Comment: Not anymore    Drug use: No        Review of Systems   Negative except as mentioned in HPI         Objective        Vitals:    24 1027   BP: 126/81   Pulse: 66       LIPIDS - LAST 2   Lab Results   Component Value Date    CHOL 111 (L) 2024    CHOL 130 2024    HDL 30 (L) 2024    HDL 38 (L) 2024    LDLCALC 67.4 2024    LDLCALC 69 2024    TRIG 68 2024    TRIG 145 2024    CHOLHDL 27.0 2024    CHOLHDL 3.4 2024       CBC -  LAST 2  Lab Results   Component Value Date    WBC 5.35 08/22/2024    WBC 4.7 01/30/2024    RBC 4.48 (L) 08/22/2024    RBC 4.05 (L) 01/30/2024    HGB 12.5 (L) 08/22/2024    HGB 11.5 (L) 01/30/2024    HCT 38.2 (L) 08/22/2024    HCT 34.9 (L) 01/30/2024    MCV 85 08/22/2024    MCV 86.2 01/30/2024    MCH 27.9 08/22/2024    MCH 28.4 01/30/2024    MCHC 32.7 08/22/2024    MCHC 33.0 01/30/2024    RDW 15.0 (H) 08/22/2024    RDW 15.3 (H) 01/30/2024     08/22/2024     01/30/2024    MPV 9.9 08/22/2024    MPV 10.9 01/30/2024    GRAN 4.9 10/27/2023    GRAN 68.3 10/27/2023    LYMPH 1,109 01/30/2024    LYMPH 23.6 01/30/2024    MONO 324 01/30/2024    MONO 6.9 01/30/2024    BASO 19 01/30/2024    BASO 0.04 10/27/2023    NRBC 0 10/27/2023    NRBC 0 02/08/2021       CHEMISTRY & LIVER FUNCTION - LAST 2  Lab Results   Component Value Date     08/22/2024     08/22/2024    K 4.2 08/22/2024    K 4.2 08/22/2024     08/22/2024     08/22/2024    CO2 25 08/22/2024    CO2 25 08/22/2024    ANIONGAP 12 08/22/2024    ANIONGAP 12 08/22/2024    BUN 14 08/22/2024    BUN 14 08/22/2024    CREATININE 1.3 08/22/2024    CREATININE 1.3 08/22/2024    CREATININE 1.3 08/22/2024     08/22/2024     08/22/2024    CALCIUM 9.5 08/22/2024    CALCIUM 9.5 08/22/2024    MG 1.9 01/30/2024    MG 2.0 09/19/2023    ALBUMIN 4.1 08/22/2024    ALBUMIN 4.4 01/30/2024    PROT 7.3 08/22/2024    PROT 7.1 01/30/2024    ALKPHOS 55 08/22/2024    ALKPHOS 69 10/27/2023    ALT 10 08/22/2024    ALT 13 01/30/2024    AST 18 08/22/2024    AST 14 01/30/2024    BILITOT 0.6 08/22/2024    BILITOT 0.5 01/30/2024        CARDIAC PROFILE - LAST 2  Lab Results   Component Value Date    BNP 11 01/30/2024    BNP 7 12/08/2022     11/21/2019    TROPONINI 0.01 (L) 01/27/2021        COAGULATION - LAST 2  Lab Results   Component Value Date    INR 1.1 01/27/2021    INR 1.0 07/02/2020    APTT 23.3 07/02/2020       ENDOCRINE & PSA - LAST 2  Lab Results    Component Value Date    HGBA1C 7.1 (H) 08/22/2024    HGBA1C 9.0 (H) 01/30/2024    MICROALBUR 11.5 12/08/2022    MICROALBUR 3.4 10/13/2020    TSH 0.817 08/22/2024    TSH 0.88 01/30/2024    PROCAL 0.23 01/27/2021        ECHOCARDIOGRAM RESULTS  Results for orders placed during the hospital encounter of 08/09/21    Stress Echo Which stress agent will be used? Treadmill Exercise; Color Flow Doppler? No    Interpretation Summary  · Concentric hypertrophy and normal systolic function.  · The estimated ejection fraction is 61%.  · Normal left ventricular diastolic function.  · The patient's exercise capacity was normal. Achieved 9 METS.  · The patient reached the end of the protocol.  · There were no arrhythmias during stress.  · Mild right ventricular enlargement.  · Mild right atrial enlargement.  · The stress echo portion of this study is negative for myocardial ischemia.  · The ECG portion of this study is negative for myocardial ischemia.  · Moderate left atrial enlargement.  · Patient reported shortness of breath during stress. Shortness of breath reported by the patient was mild.      CURRENT/PREVIOUS VISIT EKG  Results for orders placed or performed during the hospital encounter of 07/08/21   EKG 12-lead    Collection Time: 07/08/21  9:55 AM    Narrative    Test Reason : M75.102,M12.812,    Vent. Rate : 095 BPM     Atrial Rate : 095 BPM     P-R Int : 164 ms          QRS Dur : 100 ms      QT Int : 362 ms       P-R-T Axes : 051 006 044 degrees     QTc Int : 454 ms    Normal sinus rhythm  Normal ECG  When compared with ECG of 27-JAN-2021 11:05,  Nonspecific T wave abnormality, improved in Anterior leads  Confirmed by Cathy WALTERS, Gage HARMAN ([id number]) on 7/8/2021 4:10:51 PM    Referred By: [email or web address]   SELF           Confirmed By:Gage Morgan MD     No valid procedures specified.   Results for orders placed during the hospital encounter of 09/18/19    Nuclear Stress - Cardiology Interpreted    Interpretation Summary     The perfusion scan is free of evidence from myocardial ischemia or injury.    There is mild to moderate  apical thinning which is a normal variant.    Gated perfusion images showed an ejection fraction of 42.0 % at rest and 43 % post stress. Normal is 53% - 65%.    There is moderate hypokinesis at rest and stress.    LV cavity size is normal at rest and normal at stress.    The EKG portion of this study is negative for ischemia.    There were no arrhythmias during stress.    The patient reported no chest pain during the stress test.    No valid procedures specified.          PREVIOUS STRESS TEST              PREVIOUS ANGIOGRAM        PHYSICAL EXAM    CONSTITUTIONAL: Well built, well nourished in no apparent distress  HEENT: No pallor  NECK: no JVD  LUNGS: CTA b/l  HEART: regular rate and rhythm, S1, S2 normal, no murmur   ABDOMEN:  Nondistended  EXTREMITIES: No edema  NEURO: AAO X 3   SKIN:  No rash  Psych:  Normal affect    I HAVE REVIEWED :    The vital signs, nurses notes, and all the pertinent radiology and labs.        Current Outpatient Medications   Medication Instructions    albuterol (PROVENTIL HFA) 90 mcg/actuation inhaler 2 puffs, Inhalation, Every 6 hours PRN, Rescue    albuterol-ipratropium (DUO-NEB) 2.5 mg-0.5 mg/3 mL nebulizer solution 3 mLs, Nebulization, Every 6 hours PRN, Rescue    alcohol swabs (ALCOHOL PREP PADS) PadM 1 each, Topical (Top), 2 times daily    atorvastatin (LIPITOR) 20 mg, Oral    blood sugar diagnostic (TRUE METRIX GLUCOSE TEST STRIP) Strp 1 strip, Subcutaneous, 2 times daily    blood-glucose meter (TRUE METRIX AIR GLUCOSE METER) Misc 1 each, Subcutaneous, 2 times daily    cyclobenzaprine (FLEXERIL) 10 MG tablet TAKE 1 TABLET BY MOUTH TWICE DAILY AS NEEDED FOR SPASMS    ferrous sulfate (FEOSOL) 325 mg, Oral, Four times weekly    fluticasone-umeclidin-vilanter (TRELEGY ELLIPTA) 200-62.5-25 mcg inhaler 1 puff, Inhalation, Daily    HYDROcodone-acetaminophen (NORCO) 5-325 mg per tablet  1 tablet, Oral, Every 6 hours PRN    ketoconazole (NIZORAL) 2 % cream APPLY TOPICALLY TO THE AFFECTED AREA TWICE DAILY    lancets Misc 1 each, Misc.(Non-Drug; Combo Route), 2 times daily    lancing device (BD LANCET DEVICE) Misc 1 each, Misc.(Non-Drug; Combo Route), 2 times daily    meloxicam (MOBIC) 15 mg, Oral, Daily PRN    metFORMIN (GLUCOPHAGE) 1,000 mg, Oral, 2 times daily with meals    ondansetron (ZOFRAN) 4 mg, Oral, 3 times daily PRN    pantoprazole (PROTONIX) 40 MG tablet TAKE 1 TABLET(40 MG) BY MOUTH EVERY DAY    psyllium (HYDROCIL) packet 1 packet, Oral, Daily    RYBELSUS 7 mg, Oral, Daily    TURMERIC ORAL Oral        ECG reviewed by me:  Normal sinus rhythm, nonspecific T-wave abnormality  Assessment & Plan     59-year-old male with past medical history of diabetes, dyslipidemia, aortic aneurysm referred here for possible heart catheterization.  Patient has aortic aneurysm in the ascending aorta which is slowly growing.  Recent measurement up to 5.2 cm.  Has occasional random episodes of chest tightness which is unrelated to exertion.  Otherwise denies exertional anginal symptoms.  Denies any history of heart disease in the past.  Denies any family history of aneurysms.  Blood pressure well controlled at home.    Discussed with Dr. Aaron over the phone who recommended cardiac catheterization for preop for aneurysm repair surgery in near future.  Discussed with the patient regarding coronary angiogram.  Benefits and risks discussed.  Patient agreed to undergo the procedure.  Angiogram scheduled on 01/09/2025.  Continue statin  Continue home BP monitoring  Obtain echocardiogram

## 2024-12-24 NOTE — TELEPHONE ENCOUNTER
----- Message from Marge sent at 12/24/2024 11:54 AM CST -----  Regarding: Needs return call  Type: Needs Medical Advice  Who Called:  Pt wife    Best Call Back Number: [phone]    Additional Information: They are confused on his upcoming procedure and test dates, please hemal

## 2024-12-26 LAB
OHS QRS DURATION: 106 MS
OHS QTC CALCULATION: 411 MS

## 2025-01-08 ENCOUNTER — HOSPITAL ENCOUNTER (OUTPATIENT)
Dept: CARDIOLOGY | Facility: HOSPITAL | Age: 60
Discharge: HOME | End: 2025-01-08
Attending: INTERNAL MEDICINE
Payer: MEDICARE

## 2025-01-08 VITALS — BODY MASS INDEX: 36.34 KG/M2 | HEIGHT: 67 IN | WEIGHT: 231.5 LBS

## 2025-01-08 DIAGNOSIS — I71.21 ANEURYSM OF ASCENDING AORTA WITHOUT RUPTURE: ICD-10-CM

## 2025-01-08 PROCEDURE — 93306 TTE W/DOPPLER COMPLETE: CPT

## 2025-01-09 ENCOUNTER — HOSPITAL ENCOUNTER (OUTPATIENT)
Facility: HOSPITAL | Age: 60
Discharge: HOME | End: 2025-01-09
Attending: INTERNAL MEDICINE | Admitting: INTERNAL MEDICINE
Payer: MEDICARE

## 2025-01-09 VITALS
BODY MASS INDEX: 36.26 KG/M2 | OXYGEN SATURATION: 98 % | HEIGHT: 67 IN | DIASTOLIC BLOOD PRESSURE: 86 MMHG | WEIGHT: 231 LBS | HEART RATE: 63 BPM | SYSTOLIC BLOOD PRESSURE: 145 MMHG | RESPIRATION RATE: 12 BRPM

## 2025-01-09 DIAGNOSIS — R07.89 OTHER CHEST PAIN: ICD-10-CM

## 2025-01-09 DIAGNOSIS — Z01.810 PREOP CARDIOVASCULAR EXAM: ICD-10-CM

## 2025-01-09 DIAGNOSIS — R07.9 CHEST PAIN, UNSPECIFIED TYPE: ICD-10-CM

## 2025-01-09 DIAGNOSIS — I71.21 ANEURYSM OF ASCENDING AORTA WITHOUT RUPTURE: ICD-10-CM

## 2025-01-09 DIAGNOSIS — I25.10 CAD (CORONARY ARTERY DISEASE): ICD-10-CM

## 2025-01-09 DIAGNOSIS — I71.21 ASCENDING AORTIC ANEURYSM: ICD-10-CM

## 2025-01-09 LAB
ALBUMIN SERPL BCP-MCNC: 4.4 G/DL (ref 3.5–5.2)
ALP SERPL-CCNC: 59 U/L (ref 55–135)
ALT SERPL W/O P-5'-P-CCNC: 11 U/L (ref 10–44)
ANION GAP SERPL CALC-SCNC: 4 MMOL/L (ref 8–16)
AORTIC ROOT ANNULUS: 4.2 CM
AORTIC VALVE CUSP SEPERATION: 2.6 CM
APICAL FOUR CHAMBER EJECTION FRACTION: 51 %
APICAL TWO CHAMBER EJECTION FRACTION: 57 %
APTT PPP: 25.6 SEC (ref 21–32)
ASCENDING AORTA: 4.9 CM
AST SERPL-CCNC: 16 U/L (ref 10–40)
AV INDEX (PROSTH): 0.78
AV MEAN GRADIENT: 3 MMHG
AV PEAK GRADIENT: 5.8 MMHG
AV REGURGITATION PRESSURE HALF TIME: 639 MS
AV VALVE AREA BY VELOCITY RATIO: 3.7 CM²
AV VALVE AREA: 3.8 CM²
AV VELOCITY RATIO: 0.75
BASOPHILS # BLD AUTO: 0.02 K/UL (ref 0–0.2)
BASOPHILS NFR BLD: 0.5 % (ref 0–1.9)
BILIRUB SERPL-MCNC: 0.4 MG/DL (ref 0.1–1)
BSA FOR ECHO PROCEDURE: 2.23 M2
BUN SERPL-MCNC: 12 MG/DL (ref 8–23)
CALCIUM SERPL-MCNC: 8.9 MG/DL (ref 8.7–10.5)
CHLORIDE SERPL-SCNC: 106 MMOL/L (ref 95–110)
CO2 SERPL-SCNC: 29 MMOL/L (ref 23–29)
CREAT SERPL-MCNC: 1.2 MG/DL (ref 0.5–1.4)
CV ECHO LV RWT: 0.47 CM
DIFFERENTIAL METHOD BLD: ABNORMAL
DOP CALC AO PEAK VEL: 1.2 M/S
DOP CALC AO VTI: 27.4 CM
DOP CALC LVOT AREA: 4.9 CM2
DOP CALC LVOT DIAMETER: 2.5 CM
DOP CALC LVOT PEAK VEL: 0.9 M/S
DOP CALC LVOT STROKE VOLUME: 105 CM3
DOP CALC MV VTI: 21.9 CM
DOP CALCLVOT PEAK VEL VTI: 21.4 CM
E WAVE DECELERATION TIME: 205 MSEC
E/A RATIO: 0.83
E/E' RATIO: 8.77 M/S
ECHO LV POSTERIOR WALL: 1.2 CM (ref 0.6–1.1)
EOSINOPHIL # BLD AUTO: 0.1 K/UL (ref 0–0.5)
EOSINOPHIL NFR BLD: 1.4 % (ref 0–8)
ERYTHROCYTE [DISTWIDTH] IN BLOOD BY AUTOMATED COUNT: 15.2 % (ref 11.5–14.5)
EST. GFR  (NO RACE VARIABLE): >60 ML/MIN/1.73 M^2
FRACTIONAL SHORTENING: 25.5 % (ref 28–44)
GLUCOSE SERPL-MCNC: 108 MG/DL (ref 70–110)
HCT VFR BLD AUTO: 37.3 % (ref 40–54)
HGB BLD-MCNC: 12 G/DL (ref 14–18)
IMM GRANULOCYTES # BLD AUTO: 0.01 K/UL (ref 0–0.04)
IMM GRANULOCYTES NFR BLD AUTO: 0.2 % (ref 0–0.5)
INR PPP: 1 (ref 0.8–1.2)
INTERVENTRICULAR SEPTUM: 1.2 CM (ref 0.6–1.1)
IVC DIAMETER: 1.92 CM
IVRT: 81 MSEC
LEFT ATRIUM AREA SYSTOLIC (APICAL 2 CHAMBER): 23.6 CM2
LEFT ATRIUM AREA SYSTOLIC (APICAL 4 CHAMBER): 27.2 CM2
LEFT ATRIUM SIZE: 4.6 CM
LEFT ATRIUM VOLUME INDEX MOD: 44 ML/M2
LEFT ATRIUM VOLUME MOD: 94.5 ML
LEFT INTERNAL DIMENSION IN SYSTOLE: 3.8 CM (ref 2.1–4)
LEFT VENTRICLE DIASTOLIC VOLUME INDEX: 58.64 ML/M2
LEFT VENTRICLE DIASTOLIC VOLUME: 126.07 ML
LEFT VENTRICLE END DIASTOLIC VOLUME APICAL 2 CHAMBER: 151 ML
LEFT VENTRICLE END DIASTOLIC VOLUME APICAL 4 CHAMBER: 184 ML
LEFT VENTRICLE END SYSTOLIC VOLUME APICAL 2 CHAMBER: 81.9 ML
LEFT VENTRICLE END SYSTOLIC VOLUME APICAL 4 CHAMBER: 95.9 ML
LEFT VENTRICLE MASS INDEX: 112.2 G/M2
LEFT VENTRICLE SYSTOLIC VOLUME INDEX: 28.6 ML/M2
LEFT VENTRICLE SYSTOLIC VOLUME: 61.56 ML
LEFT VENTRICULAR INTERNAL DIMENSION IN DIASTOLE: 5.1 CM (ref 3.5–6)
LEFT VENTRICULAR MASS: 241.2 G
LV LATERAL E/E' RATIO: 7.13 M/S
LV SEPTAL E/E' RATIO: 11.4 M/S
LVED V (TEICH): 126.07 ML
LVES V (TEICH): 61.56 ML
LVOT MG: 2 MMHG
LVOT MV: 0.59 CM/S
LYMPHOCYTES # BLD AUTO: 0.9 K/UL (ref 1–4.8)
LYMPHOCYTES NFR BLD: 19.5 % (ref 18–48)
MCH RBC QN AUTO: 27.7 PG (ref 27–31)
MCHC RBC AUTO-ENTMCNC: 32.2 G/DL (ref 32–36)
MCV RBC AUTO: 86 FL (ref 82–98)
MONOCYTES # BLD AUTO: 0.3 K/UL (ref 0.3–1)
MONOCYTES NFR BLD: 7.5 % (ref 4–15)
MV MEAN GRADIENT: 1 MMHG
MV PEAK A VEL: 0.69 M/S
MV PEAK E VEL: 0.57 M/S
MV PEAK GRADIENT: 2 MMHG
MV VALVE AREA BY CONTINUITY EQUATION: 4.79 CM2
NEUTROPHILS # BLD AUTO: 3.1 K/UL (ref 1.8–7.7)
NEUTROPHILS NFR BLD: 70.9 % (ref 38–73)
NRBC BLD-RTO: 0 /100 WBC
OHS CV RV/LV RATIO: 0.55 CM
OHS LV EJECTION FRACTION SIMPSONS BIPLANE MOD: 53 %
PISA AR MAX VEL: 3.34 M/S
PISA TR MAX VEL: 2.91 M/S
PLATELET # BLD AUTO: 206 K/UL (ref 150–450)
PMV BLD AUTO: 9.9 FL (ref 9.2–12.9)
POC ACTIVATED CLOTTING TIME K: 256 SEC (ref 74–137)
POTASSIUM SERPL-SCNC: 4.1 MMOL/L (ref 3.5–5.1)
PROT SERPL-MCNC: 7.5 G/DL (ref 6–8.4)
PROTHROMBIN TIME: 10.9 SEC (ref 9–12.5)
PV MV: 0.76 M/S
PV PEAK GRADIENT: 6 MMHG
PV PEAK VELOCITY: 1.25 M/S
RBC # BLD AUTO: 4.33 M/UL (ref 4.6–6.2)
RIGHT VENTRICLE DIASTOLIC BASEL DIMENSION: 2.8 CM
RIGHT VENTRICULAR END-DIASTOLIC DIMENSION: 2.82 CM
RV TISSUE DOPPLER FREE WALL SYSTOLIC VELOCITY 1 (APICAL 4 CHAMBER VIEW): 13.3 CM/S
SAMPLE: ABNORMAL
SODIUM SERPL-SCNC: 139 MMOL/L (ref 136–145)
TDI LATERAL: 0.08 M/S
TDI SEPTAL: 0.05 M/S
TDI: 0.07 M/S
TR MAX PG: 34 MMHG
TRICUSPID ANNULAR PLANE SYSTOLIC EXCURSION: 1.99 CM
WBC # BLD AUTO: 4.42 K/UL (ref 3.9–12.7)
Z-SCORE OF LEFT VENTRICULAR DIMENSION IN END DIASTOLE: -3.14
Z-SCORE OF LEFT VENTRICULAR DIMENSION IN END SYSTOLE: -0.89

## 2025-01-09 PROCEDURE — 93572 IV DOP VEL&/PRESS C FLO EA: CPT | Mod: 26,52,LC, | Performed by: INTERNAL MEDICINE

## 2025-01-09 PROCEDURE — C1894 INTRO/SHEATH, NON-LASER: HCPCS | Performed by: INTERNAL MEDICINE

## 2025-01-09 PROCEDURE — 80053 COMPREHEN METABOLIC PANEL: CPT | Performed by: INTERNAL MEDICINE

## 2025-01-09 PROCEDURE — 93005 ELECTROCARDIOGRAM TRACING: CPT | Performed by: INTERNAL MEDICINE

## 2025-01-09 PROCEDURE — 85610 PROTHROMBIN TIME: CPT | Performed by: INTERNAL MEDICINE

## 2025-01-09 PROCEDURE — 99153 MOD SED SAME PHYS/QHP EA: CPT | Performed by: INTERNAL MEDICINE

## 2025-01-09 PROCEDURE — 63600175 PHARM REV CODE 636 W HCPCS: Performed by: INTERNAL MEDICINE

## 2025-01-09 PROCEDURE — 93010 ELECTROCARDIOGRAM REPORT: CPT | Mod: ,,, | Performed by: INTERNAL MEDICINE

## 2025-01-09 PROCEDURE — C1769 GUIDE WIRE: HCPCS | Performed by: INTERNAL MEDICINE

## 2025-01-09 PROCEDURE — 93458 L HRT ARTERY/VENTRICLE ANGIO: CPT | Performed by: INTERNAL MEDICINE

## 2025-01-09 PROCEDURE — 25000003 PHARM REV CODE 250: Performed by: INTERNAL MEDICINE

## 2025-01-09 PROCEDURE — 85730 THROMBOPLASTIN TIME PARTIAL: CPT | Performed by: INTERNAL MEDICINE

## 2025-01-09 PROCEDURE — 99152 MOD SED SAME PHYS/QHP 5/>YRS: CPT | Mod: ,,, | Performed by: INTERNAL MEDICINE

## 2025-01-09 PROCEDURE — 93458 L HRT ARTERY/VENTRICLE ANGIO: CPT | Mod: 26,,, | Performed by: INTERNAL MEDICINE

## 2025-01-09 PROCEDURE — 93799 UNLISTED CV SVC/PROCEDURE: CPT | Mod: LD,LC | Performed by: INTERNAL MEDICINE

## 2025-01-09 PROCEDURE — C1887 CATHETER, GUIDING: HCPCS | Performed by: INTERNAL MEDICINE

## 2025-01-09 PROCEDURE — 85025 COMPLETE CBC W/AUTO DIFF WBC: CPT | Performed by: INTERNAL MEDICINE

## 2025-01-09 PROCEDURE — 25500020 PHARM REV CODE 255: Performed by: INTERNAL MEDICINE

## 2025-01-09 PROCEDURE — 99152 MOD SED SAME PHYS/QHP 5/>YRS: CPT | Performed by: INTERNAL MEDICINE

## 2025-01-09 PROCEDURE — 93571 IV DOP VEL&/PRESS C FLO 1ST: CPT | Mod: 26,52,LD, | Performed by: INTERNAL MEDICINE

## 2025-01-09 RX ORDER — ACETAMINOPHEN 325 MG/1
650 TABLET ORAL EVERY 4 HOURS PRN
Status: DISCONTINUED | OUTPATIENT
Start: 2025-01-09 | End: 2025-01-09 | Stop reason: HOSPADM

## 2025-01-09 RX ORDER — HEPARIN SODIUM 10000 [USP'U]/ML
INJECTION, SOLUTION INTRAVENOUS; SUBCUTANEOUS
Status: DISCONTINUED | OUTPATIENT
Start: 2025-01-09 | End: 2025-01-09 | Stop reason: HOSPADM

## 2025-01-09 RX ORDER — LIDOCAINE HYDROCHLORIDE 10 MG/ML
INJECTION, SOLUTION EPIDURAL; INFILTRATION; INTRACAUDAL; PERINEURAL
Status: DISCONTINUED | OUTPATIENT
Start: 2025-01-09 | End: 2025-01-09 | Stop reason: HOSPADM

## 2025-01-09 RX ORDER — FENTANYL CITRATE 50 UG/ML
INJECTION, SOLUTION INTRAMUSCULAR; INTRAVENOUS
Status: DISCONTINUED | OUTPATIENT
Start: 2025-01-09 | End: 2025-01-09 | Stop reason: HOSPADM

## 2025-01-09 RX ORDER — NITROGLYCERIN 5 MG/ML
INJECTION, SOLUTION INTRAVENOUS
Status: DISCONTINUED | OUTPATIENT
Start: 2025-01-09 | End: 2025-01-09 | Stop reason: HOSPADM

## 2025-01-09 RX ORDER — SODIUM CHLORIDE 9 MG/ML
INJECTION, SOLUTION INTRAVENOUS CONTINUOUS
Status: ACTIVE | OUTPATIENT
Start: 2025-01-09 | End: 2025-01-09

## 2025-01-09 RX ORDER — IODIXANOL 320 MG/ML
INJECTION, SOLUTION INTRAVASCULAR
Status: DISCONTINUED | OUTPATIENT
Start: 2025-01-09 | End: 2025-01-09 | Stop reason: HOSPADM

## 2025-01-09 RX ORDER — SODIUM CHLORIDE 9 MG/ML
INJECTION, SOLUTION INTRAVENOUS ONCE
Status: COMPLETED | OUTPATIENT
Start: 2025-01-09 | End: 2025-01-09

## 2025-01-09 RX ORDER — MIDAZOLAM HYDROCHLORIDE 1 MG/ML
INJECTION INTRAMUSCULAR; INTRAVENOUS
Status: DISCONTINUED | OUTPATIENT
Start: 2025-01-09 | End: 2025-01-09 | Stop reason: HOSPADM

## 2025-01-09 RX ORDER — ONDANSETRON 4 MG/1
8 TABLET, ORALLY DISINTEGRATING ORAL EVERY 8 HOURS PRN
Status: DISCONTINUED | OUTPATIENT
Start: 2025-01-09 | End: 2025-01-09 | Stop reason: HOSPADM

## 2025-01-09 RX ADMIN — SODIUM CHLORIDE: 9 INJECTION, SOLUTION INTRAVENOUS at 08:01

## 2025-01-09 NOTE — PLAN OF CARE
Report received from ADRIANA Zamora. Arrived back from cath lab via stretcher. No complaints of pain or distress noted. TR Band to right radial CDI with 12ml of pressure per cath lab report. Post angio instructions given. Resting in bed with call light in reach.

## 2025-01-09 NOTE — Clinical Note
Radial flush given by . Radial flush consists of: 3,000 units heparin, 200 mcg nitroglycerin, and 5 cc NS flush.

## 2025-01-09 NOTE — PLAN OF CARE
Bedside hand off report given to ADRIANA Zamora .  Chart given. Left to cath lab via stretcher for scheduled procedure. Family returned to waiting area.

## 2025-01-09 NOTE — PROGRESS NOTES
Angiogram completed via right radial access.  Tolerated the procedure well  LVEDP normal      Moderate nonobstructive coronary artery disease    Left dominant system  Mild disease in left main  Discrete 50-60% stenosis proximal LAD, discrete 40% stenosis in mid LAD.  IFR across both these lesions was not hemodynamically significant (IFR 0.92)  Discrete 60-70% stenosis in proximal circumflex and discrete 40% stenosis in mid circumflex.  IFR across both these lesions was not hemodynamically significant (IFR 0.96)      Plan:  Continue medical management  Risk factor reduction  Follow up with Cardiology Clinic in 1-2 weeks  Follow up with CT surgery

## 2025-01-09 NOTE — Clinical Note
The catheter was repositioned into the ostium   right coronary artery. An angiography was performed of the right coronary arteries. Multiple views were taken. The angiography was performed via power injection. The injected amount was 4 mL contrast at 2 mL/s. The PSI from the power injection was 400.

## 2025-01-10 ENCOUNTER — TELEPHONE (OUTPATIENT)
Dept: CARDIOLOGY | Facility: CLINIC | Age: 60
End: 2025-01-10
Payer: MEDICARE

## 2025-01-10 NOTE — TELEPHONE ENCOUNTER
----- Message from Gisel sent at 1/10/2025 10:27 AM CST -----  Regarding: appointmetn  Contact: Mis  Type:  Sooner Appointment Request    Caller is requesting a sooner appointment.  Caller declined first available appointment listed below.  Caller will not accept being placed on the waitlist and is requesting a message be sent to doctor.    Name of Caller:  Mis  When is the first available appointment?  02/2025  Symptoms:  2 week hospital follow up  Would the patient rather a call back or a response via [facility]? call  Best Call Back Number:  [phone]  Additional Information:  Please call Mis to advise.  Thanks!

## 2025-01-13 LAB
OHS QRS DURATION: 94 MS
OHS QTC CALCULATION: 394 MS

## 2025-01-14 ENCOUNTER — TELEPHONE (OUTPATIENT)
Dept: VASCULAR SURGERY | Facility: CLINIC | Age: 60
End: 2025-01-14
Payer: MEDICARE

## 2025-01-14 NOTE — TELEPHONE ENCOUNTER
----- Message from Arianna sent at 1/14/2025 10:11 AM CST -----  Contact: SONJA FLORENTINO  Type:  Sooner Apoointment Request    Caller is requesting a sooner appointment.  Caller declined first available appointment listed below.  Caller will not accept being placed on the waitlist and is requesting a message be sent to doctor.  Name of Caller: SONJA FLORENTINO   When is the first available appointment? N/A  Symptoms: F/U   Would the patient rather a call back or a response via [facility]? CALL   Best Call Back Number: [phone] / [phone]  Additional Information: THANK YOU

## 2025-01-23 ENCOUNTER — OFFICE VISIT (OUTPATIENT)
Dept: CARDIOLOGY | Facility: CLINIC | Age: 60
End: 2025-01-23
Payer: MEDICARE

## 2025-01-23 VITALS
DIASTOLIC BLOOD PRESSURE: 76 MMHG | OXYGEN SATURATION: 99 % | HEART RATE: 89 BPM | SYSTOLIC BLOOD PRESSURE: 116 MMHG | HEIGHT: 67 IN | BODY MASS INDEX: 36.68 KG/M2 | WEIGHT: 233.69 LBS

## 2025-01-23 DIAGNOSIS — E78.5 DYSLIPIDEMIA: Primary | ICD-10-CM

## 2025-01-23 DIAGNOSIS — I71.21 ANEURYSM OF ASCENDING AORTA WITHOUT RUPTURE: ICD-10-CM

## 2025-01-23 DIAGNOSIS — I25.10 ATHEROSCLEROSIS OF NATIVE CORONARY ARTERY OF NATIVE HEART WITHOUT ANGINA PECTORIS: ICD-10-CM

## 2025-01-23 PROCEDURE — 99204 OFFICE O/P NEW MOD 45 MIN: CPT | Mod: S$GLB,,, | Performed by: INTERNAL MEDICINE

## 2025-01-23 PROCEDURE — 99999 PR PBB SHADOW E&M-EST. PATIENT-LVL III: CPT | Mod: PBBFAC,,, | Performed by: INTERNAL MEDICINE

## 2025-01-23 NOTE — PROGRESS NOTES
[facility]    Subjective:     Patient ID:  José Miguel Garcia is a 59 y.o. male patient here for evaluation Hospital Follow Up (Patient had angiogram on 01/09/2025 )      HPI:  59-year-old male here for follow-up after an angiogram.  Patient had an angiogram done for his ascending aortic dilatation and occasional episodes of chest pain and tightness and shortness of breath.  He had moderate CAD which was non critically flow limiting by IFR of 0.92 in the LAD at 0.96 in the circumflex.  Patient reports since then he has been walking around without any exertional chest discomfort, does get occasionally shortness of breath but it is not exercise limiting and he can walk through it.    Review of Systems   All other systems reviewed and are negative.       Past Medical History:   Diagnosis Date    COVID-19 02/2020    Diabetes mellitus     type 2    DVT (deep venous thrombosis) 2011    Approx. 2011, after having a bunion removed on left. pt was on warfarin for approx. 6 weeks. unsure of time    Hypertension     Sickle cell anemia trait     pt states all his life    Sleep apnea     USES CPAP    Wears glasses        Past Surgical History:   Procedure Laterality Date    ANGIOGRAM, CORONARY, WITH LEFT HEART CATHETERIZATION N/A 1/9/2025    Procedure: Angiogram, Coronary, with Left Heart Cath;  Surgeon: Ghulam Hanna MD;  Location: [facility] CATH/EP LAB;  Service: Cardiology;  Laterality: N/A;    ARTHROSCOPIC REPAIR OF ROTATOR CUFF OF SHOULDER Left 12/16/2021    Procedure: REPAIR, ROTATOR CUFF, ARTHROSCOPIC;  Surgeon: Ezekiel Schaefer MD;  Location: [facility] OR;  Service: Orthopedics;  Laterality: Left;    ARTHROSCOPIC TENOTOMY OF BICEPS TENDON Left 12/16/2021    Procedure: TENOTOMY, BICEPS, ARTHROSCOPIC;  Surgeon: Ezekiel Schaefer MD;  Location: [facility] OR;  Service: Orthopedics;  Laterality: Left;    ARTHROSCOPY OF SHOULDER WITH DECOMPRESSION OF SUBACROMIAL SPACE  Left 2021    Procedure: ARTHROSCOPY, SHOULDER, WITH SUBACROMIAL SPACE DECOMPRESSION;  Surgeon: Ezekiel Schaefer MD;  Location: [facility];  Service: Orthopedics;  Laterality: Left;  GENERAL AND BLOCK    BUNIONECTOMY Left     Pt developed a blood clot in leg, after having bunion removed. Had to do coumadin for approx. 6 weeks.    BUNIONECTOMY Left     complicated by dvt    COLONOSCOPY N/A 2016    Procedure: COLONOSCOPY;  Surgeon: Keegan Vo MD;  Location: [facility] ENDO;  Service: Endoscopy;  Laterality: N/A;    COLONOSCOPY N/A 2020    Procedure: COLONOSCOPY;  Surgeon: Keegan Vo MD;  Location: [facility] ENDO;  Service: Endoscopy;  Laterality: N/A;    EPIDURAL STEROID INJECTION  2019    lumbar    ESOPHAGOGASTRODUODENOSCOPY N/A 2020    Procedure: EGD (ESOPHAGOGASTRODUODENOSCOPY);  Surgeon: Keegan Vo MD;  Location: [facility] ENDO;  Service: Endoscopy;  Laterality: N/A;    INSTANTANEOUS WAVE-FREE RATIO (IFR) N/A 2025    Procedure: Instantaneous Wave-Free Ratio (IFR);  Surgeon: Ghulam Hanna MD;  Location: [facility] CATH/EP LAB;  Service: Cardiology;  Laterality: N/A;    INTRALUMINAL GASTROINTESTINAL TRACT IMAGING VIA CAPSULE N/A 2020    Procedure: IMAGING PROCEDURE, GI TRACT, INTRALUMINAL, VIA CAPSULE;  Surgeon: Keegan Vo MD;  Location: [facility] ENDO;  Service: Endoscopy;  Laterality: N/A;    JOINT REPLACEMENT      right    KNEE SURGERY Right     SHOULDER SURGERY Right     SHOULDER SURGERY Right     VASECTOMY Bilateral        Family History   Problem Relation Name Age of Onset    Heart block Mother      Kidney disease Mother      Cancer Paternal Aunt         Social History     Socioeconomic History    Marital status: Single   Tobacco Use    Smoking status: Former     Current packs/day: 0.00     Average packs/day: 0.3 packs/day for 0.9 years (0.2 ttl pk-yrs)     Types: Cigarettes     Start date: 2000     Quit date: 2000     Years since quittin.1     Smokeless tobacco: Never   Substance and Sexual Activity    Alcohol use: Not Currently     Alcohol/week: 1.0 standard drink of alcohol     Types: 1 Drinks containing 0.5 oz of alcohol per week    Drug use: Not Currently    Sexual activity: Not Currently       Current Outpatient Medications   Medication Sig Dispense Refill    albuterol-ipratropium (DUO-NEB) 2.5 mg-0.5 mg/3 mL nebulizer solution Take 3 mLs by nebulization every 6 (six) hours as needed for Wheezing or Shortness of Breath. Rescue 1 each 11    alcohol swabs (ALCOHOL PREP PADS) PadM Apply 1 each topically 2 (two) times a day. 100 each 11    atorvastatin (LIPITOR) 20 MG tablet TAKE 1 TABLET(20 MG) BY MOUTH EVERY DAY 90 tablet 3    blood sugar diagnostic (TRUE METRIX GLUCOSE TEST STRIP) Strp Inject 1 strip into the skin 2 (two) times a day. 100 each 11    blood-glucose meter (TRUE METRIX AIR GLUCOSE METER) Misc Inject 1 each into the skin 2 (two) times a day. 1 each 2    cyclobenzaprine (FLEXERIL) 10 MG tablet TAKE 1 TABLET BY MOUTH TWICE DAILY AS NEEDED FOR SPASMS      ferrous sulfate (FEOSOL) 325 mg (65 mg iron) Tab tablet Take 1 tablet (325 mg total) by mouth 4 (four) times a week. 90 tablet 3    fluticasone-umeclidin-vilanter (TRELEGY ELLIPTA) 200-62.5-25 mcg inhaler Inhale 1 puff into the lungs once daily. 60 each 11    HYDROcodone-acetaminophen (NORCO) 5-325 mg per tablet Take 1 tablet by mouth every 6 (six) hours as needed for Pain. 10 tablet 0    ketoconazole (NIZORAL) 2 % cream APPLY TOPICALLY TO THE AFFECTED AREA TWICE DAILY 60 g 2    lancets Misc 1 each by Misc.(Non-Drug; Combo Route) route 2 (two) times daily. 100 each 11    lancing device (BD LANCET DEVICE) Misc 1 each by Misc.(Non-Drug; Combo Route) route 2 (two) times daily. 1 each 1    meloxicam (MOBIC) 15 MG tablet Take 15 mg by mouth daily as needed.      metFORMIN (GLUCOPHAGE) 1000 MG tablet Take 1 tablet (1,000 mg total) by mouth 2 (two) times daily with meals. 180 tablet 3     pantoprazole (PROTONIX) 40 MG tablet TAKE 1 TABLET(40 MG) BY MOUTH EVERY DAY 90 tablet 3    psyllium (HYDROCIL) packet Take 1 packet by mouth once daily. 30 packet 12    semaglutide (OZEMPIC SUBQ) Inject into the skin.      TURMERIC ORAL Take 1 tablet by mouth Daily.      albuterol (PROVENTIL HFA) 90 mcg/actuation inhaler Inhale 2 puffs into the lungs every 6 (six) hours as needed for Wheezing. Rescue 18 g 1     No current facility-administered medications for this visit.       Review of patient's allergies indicates:   Allergen Reactions    Lisinopril Other (See Comments)     cough    Losartan Other (See Comments)     cough         Objective:        Vitals:    01/23/25 1401   BP: 116/76   Pulse: 89       Physical Exam  Vitals reviewed.   Constitutional:       Appearance: Normal appearance.   Eyes:      Extraocular Movements: Extraocular movements intact.      Pupils: Pupils are equal, round, and reactive to light.   Cardiovascular:      Rate and Rhythm: Normal rate and regular rhythm.      Pulses: Normal pulses.      Heart sounds: Normal heart sounds. No murmur heard.     No gallop.   Pulmonary:      Effort: Pulmonary effort is normal.      Breath sounds: Normal breath sounds.   Skin:     General: Skin is warm.   Neurological:      General: No focal deficit present.      Mental Status: He is alert and oriented to person, place, and time.         LIPIDS - LAST 2   Lab Results   Component Value Date    CHOL 111 (L) 08/22/2024    CHOL 130 01/30/2024    HDL 30 (L) 08/22/2024    HDL 38 (L) 01/30/2024    LDLCALC 67.4 08/22/2024    LDLCALC 69 01/30/2024    TRIG 68 08/22/2024    TRIG 145 01/30/2024    CHOLHDL 27.0 08/22/2024    CHOLHDL 3.4 01/30/2024       CBC - LAST 2  Lab Results   Component Value Date    WBC 4.42 01/09/2025    WBC 5.35 08/22/2024    RBC 4.33 (L) 01/09/2025    RBC 4.48 (L) 08/22/2024    HGB 12.0 (L) 01/09/2025    HGB 12.5 (L) 08/22/2024    HCT 37.3 (L) 01/09/2025    HCT 38.2 (L) 08/22/2024    MCV 86  01/09/2025    MCV 85 08/22/2024    MCH 27.7 01/09/2025    MCH 27.9 08/22/2024    MCHC 32.2 01/09/2025    MCHC 32.7 08/22/2024    RDW 15.2 (H) 01/09/2025    RDW 15.0 (H) 08/22/2024     01/09/2025     08/22/2024    MPV 9.9 01/09/2025    MPV 9.9 08/22/2024    GRAN 3.1 01/09/2025    GRAN 70.9 01/09/2025    LYMPH 0.9 (L) 01/09/2025    LYMPH 19.5 01/09/2025    MONO 0.3 01/09/2025    MONO 7.5 01/09/2025    BASO 0.02 01/09/2025    BASO 19 01/30/2024    NRBC 0 01/09/2025    NRBC 0 10/27/2023       CHEMISTRY & LIVER FUNCTION - LAST 2  Lab Results   Component Value Date     01/09/2025     08/22/2024     08/22/2024    K 4.1 01/09/2025    K 4.2 08/22/2024    K 4.2 08/22/2024     01/09/2025     08/22/2024     08/22/2024    CO2 29 01/09/2025    CO2 25 08/22/2024    CO2 25 08/22/2024    ANIONGAP 4 (L) 01/09/2025    ANIONGAP 12 08/22/2024    ANIONGAP 12 08/22/2024    BUN 12 01/09/2025    BUN 14 08/22/2024    BUN 14 08/22/2024    CREATININE 1.2 01/09/2025    CREATININE 1.3 08/22/2024    CREATININE 1.3 08/22/2024    CREATININE 1.3 08/22/2024     01/09/2025     08/22/2024     08/22/2024    CALCIUM 8.9 01/09/2025    CALCIUM 9.5 08/22/2024    CALCIUM 9.5 08/22/2024    MG 1.9 01/30/2024    MG 2.0 09/19/2023    ALBUMIN 4.4 01/09/2025    ALBUMIN 4.1 08/22/2024    PROT 7.5 01/09/2025    PROT 7.3 08/22/2024    ALKPHOS 59 01/09/2025    ALKPHOS 55 08/22/2024    ALT 11 01/09/2025    ALT 10 08/22/2024    AST 16 01/09/2025    AST 18 08/22/2024    BILITOT 0.4 01/09/2025    BILITOT 0.6 08/22/2024        CARDIAC PROFILE - LAST 2  Lab Results   Component Value Date    BNP 11 01/30/2024    BNP 7 12/08/2022     11/21/2019    TROPONINI 0.01 (L) 01/27/2021        COAGULATION - LAST 2  Lab Results   Component Value Date    INR 1.0 01/09/2025    INR 1.1 01/27/2021    APTT 25.6 01/09/2025    APTT 23.3 07/02/2020       ENDOCRINE & PSA - LAST 2  Lab Results   Component Value Date     HGBA1C 7.1 (H) 08/22/2024    HGBA1C 9.0 (H) 01/30/2024    MICROALBUR 11.5 12/08/2022    MICROALBUR 3.4 10/13/2020    TSH 0.817 08/22/2024    TSH 0.88 01/30/2024    PROCAL 0.23 01/27/2021        ECHOCARDIOGRAM RESULTS  Results for orders placed during the hospital encounter of 01/08/25    Echo Saline Bubble? No; Ultrasound enhancing contrast? No    Interpretation Summary    Left Ventricle: The left ventricle is normal in size. There is concentric remodeling. There is low normal systolic function. Quantitated ejection fraction is 53%. There is diastolic dysfunction.    Right Ventricle: Normal right ventricular cavity size. Systolic function is normal.    Left Atrium: Left atrium is moderately dilated.    Aortic Valve: There is mild aortic regurgitation.    Mitral Valve: There is mild regurgitation.    Tricuspid Valve: There is mild regurgitation.    Aorta: Ascending aorta is dilated measuring about 5.0-5.20 cm      CURRENT/PREVIOUS VISIT EKG  Results for orders placed or performed during the hospital encounter of 01/09/25   EKG 12-lead    Collection Time: 01/09/25  9:30 AM   Result Value Ref Range    QRS Duration 94 ms    OHS QTC Calculation 394 ms    Narrative    Test Reason : R07.89,    Vent. Rate :  60 BPM     Atrial Rate :  60 BPM     P-R Int : 210 ms          QRS Dur :  94 ms      QT Int : 394 ms       P-R-T Axes :  58   4  30 degrees    QTcB Int : 394 ms    Sinus rhythm with 1st degree A-V block  Nonspecific T wave abnormality  Abnormal ECG  When compared with ECG of 24-Dec-2024 10:20,  No significant change was found  Confirmed by Ethan Robbins ([id number]) on 1/13/2025 6:05:19 PM    Referred By: LARA BAEZA           Confirmed By: Ethan Robbins     No valid procedures specified.   Results for orders placed during the hospital encounter of 09/18/19    Nuclear Stress - Cardiology Interpreted    Interpretation Summary    The perfusion scan is free of evidence from myocardial ischemia or injury.    There is mild  to moderate  apical thinning which is a normal variant.    Gated perfusion images showed an ejection fraction of 42.0 % at rest and 43 % post stress. Normal is 53% - 65%.    There is moderate hypokinesis at rest and stress.    LV cavity size is normal at rest and normal at stress.    The EKG portion of this study is negative for ischemia.    There were no arrhythmias during stress.    The patient reported no chest pain during the stress test.    No valid procedures specified.        Assessment:       1. Dyslipidemia    2. Aneurysm of ascending aorta without rupture    3. Atherosclerosis of native coronary artery of native heart without angina pectoris           Plan:       Dyslipidemia    Aneurysm of ascending aorta without rupture    Atherosclerosis of native coronary artery of native heart without angina pectoris    LDL is well controlled, continue with the atorvastatin.  Follow-up with CT surgery for further evaluation and management of ascending aorta.    Instructed patient to continue with regular physical exertional activities and if he notices any chest discomfort or tightness with exertion on a consistent basis, he should reach out to us at which point we will consider repeat evaluation.    Follow up in about 6 months (around 7/23/2025).          MD [facility] [place]  [place]

## 2025-01-29 ENCOUNTER — OFFICE VISIT (OUTPATIENT)
Dept: VASCULAR SURGERY | Facility: CLINIC | Age: 60
End: 2025-01-29
Payer: MEDICARE

## 2025-01-29 VITALS — HEART RATE: 70 BPM | DIASTOLIC BLOOD PRESSURE: 83 MMHG | SYSTOLIC BLOOD PRESSURE: 129 MMHG

## 2025-01-29 DIAGNOSIS — I71.21 ANEURYSM OF ASCENDING AORTA WITHOUT RUPTURE: ICD-10-CM

## 2025-01-29 DIAGNOSIS — I25.10 CORONARY ARTERY DISEASE INVOLVING NATIVE CORONARY ARTERY OF NATIVE HEART WITHOUT ANGINA PECTORIS: ICD-10-CM

## 2025-01-29 DIAGNOSIS — I71.20 THORACIC AORTIC ANEURYSM WITHOUT RUPTURE, UNSPECIFIED PART: Primary | ICD-10-CM

## 2025-01-29 DIAGNOSIS — I71.21 ANEURYSM OF ASCENDING AORTA WITHOUT RUPTURE: Primary | ICD-10-CM

## 2025-01-29 PROCEDURE — 99214 OFFICE O/P EST MOD 30 MIN: CPT | Mod: S$GLB,,, | Performed by: THORACIC SURGERY (CARDIOTHORACIC VASCULAR SURGERY)

## 2025-01-29 PROCEDURE — 99999 PR PBB SHADOW E&M-EST. PATIENT-LVL III: CPT | Mod: PBBFAC,,, | Performed by: THORACIC SURGERY (CARDIOTHORACIC VASCULAR SURGERY)

## 2025-01-29 RX ORDER — ASPIRIN 81 MG
200 TABLET, DELAYED RELEASE (ENTERIC COATED) ORAL
COMMUNITY
Start: 2024-05-13

## 2025-01-29 RX ORDER — FERROUS FUMARATE 324(106)MG
1 TABLET ORAL
COMMUNITY

## 2025-01-29 RX ORDER — SEMAGLUTIDE 1.34 MG/ML
INJECTION, SOLUTION SUBCUTANEOUS
COMMUNITY
Start: 2024-12-23

## 2025-01-29 RX ORDER — ALBUTEROL SULFATE AND BUDESONIDE 90; 80 UG/1; UG/1
AEROSOL, METERED RESPIRATORY (INHALATION)
COMMUNITY
Start: 2024-05-13

## 2025-01-29 RX ORDER — DICLOFENAC SODIUM 10 MG/G
GEL TOPICAL
COMMUNITY
Start: 2024-05-13

## 2025-01-29 RX ORDER — LANCETS 33 GAUGE
EACH MISCELLANEOUS
COMMUNITY
Start: 2024-12-16

## 2025-01-29 NOTE — PROGRESS NOTES
This patient had recent heart catheterization after having been referred to Cardiology for an ascending aortic aneurysm.  The aneurysm is approximately 5.1 cm in greatest transverse dimension.  The patient is asymptomatic.  His past history is well documented.  He has history of hypertension.  He has had orthopedic problems in the past.  Medicines are part of the epic record.    On exam vital signs are stable.  He has no remarkable physical findings.    Recent studies were reviewed.  He has moderate coronary artery disease.  The aneurysm is 5.1 cm in greatest transverse dimension.    Recommendation is for a repeat CTA of the chest and abdomen in 4-6 months.

## 2025-05-29 ENCOUNTER — TELEPHONE (OUTPATIENT)
Dept: VASCULAR SURGERY | Facility: CLINIC | Age: 60
End: 2025-05-29
Payer: MEDICARE

## 2025-05-29 NOTE — TELEPHONE ENCOUNTER
----- Message from Iker sent at 5/29/2025 10:14 AM CDT -----  Type:  Appointment RequestCaller is requesting a  appointment.  Name of Caller:pt When is the first available appointment? None seenSymptoms: 3 month follow upWould the patient rather a call back or a response via [facility]? Call [facility] Call Back Number:[phone] Information: Pt isn't sure if it was a 3 month or 6 month follow up appt he needs to book, last saw the provider in January.   Please call back to advise. Thanks!

## 2025-06-02 ENCOUNTER — OFFICE VISIT (OUTPATIENT)
Dept: PODIATRY | Facility: CLINIC | Age: 60
End: 2025-06-02
Payer: MEDICARE

## 2025-06-02 VITALS
HEART RATE: 63 BPM | SYSTOLIC BLOOD PRESSURE: 135 MMHG | BODY MASS INDEX: 35.63 KG/M2 | DIASTOLIC BLOOD PRESSURE: 75 MMHG | WEIGHT: 227 LBS | RESPIRATION RATE: 18 BRPM | HEIGHT: 67 IN

## 2025-06-02 DIAGNOSIS — L60.0 INGROWN NAIL OF GREAT TOE OF RIGHT FOOT: ICD-10-CM

## 2025-06-02 DIAGNOSIS — B35.3 TINEA PEDIS OF BOTH FEET: ICD-10-CM

## 2025-06-02 DIAGNOSIS — M20.11 HALLUX ABDUCTOVALGUS, RIGHT: ICD-10-CM

## 2025-06-02 DIAGNOSIS — B35.1 ONYCHOMYCOSIS OF TOENAIL: ICD-10-CM

## 2025-06-02 DIAGNOSIS — E11.65 TYPE 2 DIABETES MELLITUS WITH HYPERGLYCEMIA, UNSPECIFIED WHETHER LONG TERM INSULIN USE: ICD-10-CM

## 2025-06-02 DIAGNOSIS — E11.9 COMPREHENSIVE DIABETIC FOOT EXAMINATION, TYPE 2 DM, ENCOUNTER FOR: Primary | ICD-10-CM

## 2025-06-02 DIAGNOSIS — L60.0 INGROWN NAIL OF GREAT TOE OF LEFT FOOT: ICD-10-CM

## 2025-06-02 DIAGNOSIS — Z79.899 HIGH RISK MEDICATION USE: ICD-10-CM

## 2025-06-02 PROCEDURE — 99204 OFFICE O/P NEW MOD 45 MIN: CPT | Mod: S$GLB,,, | Performed by: PODIATRIST

## 2025-06-02 PROCEDURE — 99999 PR PBB SHADOW E&M-EST. PATIENT-LVL V: CPT | Mod: PBBFAC,,, | Performed by: PODIATRIST

## 2025-06-02 RX ORDER — SEMAGLUTIDE 1.34 MG/ML
1 INJECTION, SOLUTION SUBCUTANEOUS
COMMUNITY
Start: 2025-03-20 | End: 2026-02-19

## 2025-06-02 RX ORDER — CLOTRIMAZOLE AND BETAMETHASONE DIPROPIONATE 10; .64 MG/G; MG/G
CREAM TOPICAL 2 TIMES DAILY
Qty: 45 G | Refills: 3 | Status: SHIPPED | OUTPATIENT
Start: 2025-06-02

## 2025-06-03 ENCOUNTER — PATIENT MESSAGE (OUTPATIENT)
Dept: PODIATRY | Facility: CLINIC | Age: 60
End: 2025-06-03
Payer: MEDICARE

## 2025-06-03 RX ORDER — TERBINAFINE HYDROCHLORIDE 250 MG/1
250 TABLET ORAL DAILY
Qty: 45 TABLET | Refills: 0 | Status: SHIPPED | OUTPATIENT
Start: 2025-06-03 | End: 2025-07-18

## 2025-06-06 ENCOUNTER — TELEPHONE (OUTPATIENT)
Dept: PODIATRY | Facility: CLINIC | Age: 60
End: 2025-06-06
Payer: MEDICARE

## 2025-06-09 ENCOUNTER — TELEPHONE (OUTPATIENT)
Dept: VASCULAR SURGERY | Facility: CLINIC | Age: 60
End: 2025-06-09
Payer: MEDICARE

## 2025-07-09 ENCOUNTER — TELEPHONE (OUTPATIENT)
Dept: PODIATRY | Facility: CLINIC | Age: 60
End: 2025-07-09
Payer: MEDICARE

## 2025-07-09 ENCOUNTER — LAB VISIT (OUTPATIENT)
Dept: LAB | Facility: HOSPITAL | Age: 60
End: 2025-07-09
Attending: PODIATRIST
Payer: MEDICARE

## 2025-07-09 DIAGNOSIS — Z79.899 HIGH RISK MEDICATION USE: ICD-10-CM

## 2025-07-09 LAB
ALBUMIN SERPL BCP-MCNC: 4.4 G/DL (ref 3.5–5.2)
ALP SERPL-CCNC: 64 UNIT/L (ref 40–150)
ALT SERPL W/O P-5'-P-CCNC: 20 UNIT/L (ref 10–44)
AST SERPL-CCNC: 22 UNIT/L (ref 11–45)
BILIRUB DIRECT SERPL-MCNC: 0.1 MG/DL (ref 0.1–0.3)
BILIRUB SERPL-MCNC: 0.4 MG/DL (ref 0.1–1)
PROT SERPL-MCNC: 7.9 GM/DL (ref 6–8.4)

## 2025-07-09 PROCEDURE — 36415 COLL VENOUS BLD VENIPUNCTURE: CPT

## 2025-07-09 PROCEDURE — 80076 HEPATIC FUNCTION PANEL: CPT

## 2025-07-09 RX ORDER — TERBINAFINE HYDROCHLORIDE 250 MG/1
250 TABLET ORAL DAILY
Qty: 45 TABLET | Refills: 0 | Status: SHIPPED | OUTPATIENT
Start: 2025-07-09 | End: 2025-08-23

## 2025-07-09 NOTE — TELEPHONE ENCOUNTER
----- Message from Desire Joaquin DPM sent at 7/9/2025 11:12 AM CDT -----  Please advise patient blood work was perfect, he should still have a few days left of the 1st prescription and the 2nd half was sent to pharmacy today to complete 90 days. Thank you   ----- Message -----  From: Lab, Background User  Sent: 7/9/2025   9:54 AM CDT  To: Desire Joaquin DPM

## 2025-07-09 NOTE — TELEPHONE ENCOUNTER
Notified pt of lab results and that second half of terbinafine was sent to the pharmacy. Pt verbalized understanding. BRIAN Morrison 07/09/2025

## 2025-07-16 ENCOUNTER — TELEPHONE (OUTPATIENT)
Dept: PODIATRY | Facility: CLINIC | Age: 60
End: 2025-07-16
Payer: MEDICARE

## 2025-07-16 NOTE — TELEPHONE ENCOUNTER
Patient would like his terbinafine prescription sent to [facility] in [place] rather than [facility].     Thank you,  BRIAN Morrison

## 2025-07-16 NOTE — TELEPHONE ENCOUNTER
----- Message from Vianney sent at 7/16/2025  8:06 AM CDT -----  The patient needs a refill on Terbinafine 250mg please send the request to [facility] in [place].     If you have any question, please call the patient directly at [phone].    Vianney REYES

## 2025-07-18 RX ORDER — TERBINAFINE HYDROCHLORIDE 250 MG/1
250 TABLET ORAL DAILY
Qty: 45 TABLET | Refills: 0 | Status: SHIPPED | OUTPATIENT
Start: 2025-07-18 | End: 2025-07-18 | Stop reason: SDUPTHER

## 2025-07-18 RX ORDER — TERBINAFINE HYDROCHLORIDE 250 MG/1
250 TABLET ORAL DAILY
Qty: 45 TABLET | Refills: 0 | Status: SHIPPED | OUTPATIENT
Start: 2025-07-18 | End: 2025-09-01

## 2025-07-22 NOTE — TELEPHONE ENCOUNTER
Please send terbinafine prescription to [facility] in [place]. Previously, that was a mistake on my end. Sorry!     Thank you,   BRIAN Morrison 07/22/2025

## 2025-07-22 NOTE — TELEPHONE ENCOUNTER
----- Message from Vianney sent at 7/22/2025 11:07 AM CDT -----  The patient stated that his refill was sent to the incorrect pharmacy, Terbinafine 250mg should be sent to [facility] in [place].     Please call the patient with any questions, [phone].    Vianney REYES

## 2025-07-24 RX ORDER — TERBINAFINE HYDROCHLORIDE 250 MG/1
250 TABLET ORAL DAILY
Qty: 45 TABLET | Refills: 0 | Status: SHIPPED | OUTPATIENT
Start: 2025-07-24 | End: 2025-09-07

## 2025-07-30 ENCOUNTER — HOSPITAL ENCOUNTER (OUTPATIENT)
Dept: RADIOLOGY | Facility: HOSPITAL | Age: 60
Discharge: HOME | End: 2025-07-30
Attending: THORACIC SURGERY (CARDIOTHORACIC VASCULAR SURGERY)
Payer: MEDICARE

## 2025-07-30 DIAGNOSIS — I71.20 THORACIC AORTIC ANEURYSM WITHOUT RUPTURE, UNSPECIFIED PART: ICD-10-CM

## 2025-07-30 DIAGNOSIS — I71.21 ANEURYSM OF ASCENDING AORTA WITHOUT RUPTURE: ICD-10-CM

## 2025-07-30 PROCEDURE — 74175 CTA ABDOMEN W/CONTRAST: CPT | Mod: TC

## 2025-07-30 PROCEDURE — 25500020 PHARM REV CODE 255: Performed by: THORACIC SURGERY (CARDIOTHORACIC VASCULAR SURGERY)

## 2025-07-30 PROCEDURE — 74175 CTA ABDOMEN W/CONTRAST: CPT | Mod: 26,,, | Performed by: RADIOLOGY

## 2025-07-30 PROCEDURE — 71275 CT ANGIOGRAPHY CHEST: CPT | Mod: 26,,, | Performed by: RADIOLOGY

## 2025-07-30 RX ADMIN — IOHEXOL 100 ML: 350 INJECTION, SOLUTION INTRAVENOUS at 08:07

## 2025-07-31 ENCOUNTER — TELEPHONE (OUTPATIENT)
Dept: VASCULAR SURGERY | Facility: CLINIC | Age: 60
End: 2025-07-31
Payer: MEDICARE

## 2025-07-31 ENCOUNTER — OFFICE VISIT (OUTPATIENT)
Dept: PODIATRY | Facility: CLINIC | Age: 60
End: 2025-07-31
Payer: MEDICARE

## 2025-07-31 VITALS
DIASTOLIC BLOOD PRESSURE: 103 MMHG | HEIGHT: 67 IN | WEIGHT: 227.06 LBS | SYSTOLIC BLOOD PRESSURE: 149 MMHG | HEART RATE: 64 BPM | BODY MASS INDEX: 35.64 KG/M2

## 2025-07-31 DIAGNOSIS — Z79.899 HIGH RISK MEDICATION USE: ICD-10-CM

## 2025-07-31 DIAGNOSIS — L60.0 INGROWN NAIL OF GREAT TOE OF RIGHT FOOT: ICD-10-CM

## 2025-07-31 DIAGNOSIS — I71.20 THORACIC AORTIC ANEURYSM WITHOUT RUPTURE, UNSPECIFIED PART: Primary | ICD-10-CM

## 2025-07-31 DIAGNOSIS — I71.21 ANEURYSM OF ASCENDING AORTA WITHOUT RUPTURE: ICD-10-CM

## 2025-07-31 DIAGNOSIS — B35.1 ONYCHOMYCOSIS OF TOENAIL: ICD-10-CM

## 2025-07-31 DIAGNOSIS — B35.3 TINEA PEDIS OF BOTH FEET: Primary | ICD-10-CM

## 2025-07-31 DIAGNOSIS — E11.65 TYPE 2 DIABETES MELLITUS WITH HYPERGLYCEMIA, UNSPECIFIED WHETHER LONG TERM INSULIN USE: ICD-10-CM

## 2025-07-31 PROCEDURE — 99999 PR PBB SHADOW E&M-EST. PATIENT-LVL IV: CPT | Mod: PBBFAC,,, | Performed by: PODIATRIST

## 2025-07-31 PROCEDURE — 99214 OFFICE O/P EST MOD 30 MIN: CPT | Mod: S$GLB,,, | Performed by: PODIATRIST

## (undated) DEVICE — SKINMARKER W/RULER DEVON

## (undated) DEVICE — APPLICATOR CHLORAPREP ORN 26ML

## (undated) DEVICE — SOL IRR NACL .9% 3000ML

## (undated) DEVICE — NDL SPINAL 18GX3.5 SPINOCAN

## (undated) DEVICE — SEE MEDLINE ITEM 157118

## (undated) DEVICE — GLOVE SURG ULTRA TOUCH 7.5

## (undated) DEVICE — SYS LABEL CORRECT MED

## (undated) DEVICE — STRAP OR TABLE 5IN X 72IN

## (undated) DEVICE — DRAPE INCISE IOBAN 2 23X17IN

## (undated) DEVICE — DRAPE STERI INSTRUMENT 1018

## (undated) DEVICE — SUT 2-0 ETHILON 18 FS

## (undated) DEVICE — PACK SHOULDER

## (undated) DEVICE — SUT ETHILON 3-0 FS-1 30

## (undated) DEVICE — CANNULA TRIPLEDAM 8.25MM 7CM

## (undated) DEVICE — SEE MEDLINE ITEM 152622

## (undated) DEVICE — ELECTRODE COOLPULSE 90 W/HAND

## (undated) DEVICE — SEE MEDLINE ITEM 146420

## (undated) DEVICE — SEE MEDLINE ITEM 146292

## (undated) DEVICE — DRAPE STERI-DRAPE 1000 17X11IN

## (undated) DEVICE — CANISTER SUCTION MEDI-VAC 12L

## (undated) DEVICE — CUBE COLD THERAPY POLAR CARE

## (undated) DEVICE — ELECTRODE REM PLYHSV RETURN 9

## (undated) DEVICE — TAPE CLOTH SOFT MEDIPORE 4IN

## (undated) DEVICE — TUBING PUMP ARTHROSCOPY STRL

## (undated) DEVICE — BURR 4.0MM

## (undated) DEVICE — DRAPE SURG W/TWL 17 5/8X23

## (undated) DEVICE — MANIFOLD 4 PORT

## (undated) DEVICE — SPONGE GAUZE 16PLY 4X4

## (undated) DEVICE — SEE MEDLINE ITEM 147518

## (undated) DEVICE — SEE MEDLINE ITEM 146313

## (undated) DEVICE — HEMOSTAT VASC BAND REG 24CM

## (undated) DEVICE — CATH DXTERITY JR40 100CM 5FR

## (undated) DEVICE — UNDERGLOVES BIOGEL PI SZ 7 LF

## (undated) DEVICE — PAD ABD 8X10 STERILE

## (undated) DEVICE — SEE MEDLINE ITEM 157116

## (undated) DEVICE — GLOVE SURG ULTRA TOUCH 9

## (undated) DEVICE — GLOVE BIOGEL SKINSENSE PI 7.5

## (undated) DEVICE — MAT QUICK 40X30 FLOOR FLUID LF

## (undated) DEVICE — DRAPE STERI U-SHAPED 47X51IN

## (undated) DEVICE — GOWN SURG 2XL DISP TIE BACK

## (undated) DEVICE — DRESSING N ADH OIL EMUL 3X3

## (undated) DEVICE — UNDERGLOVES BIOGEL PI SIZE 8.5

## (undated) DEVICE — CANNULA MTK THRD CLR 7X75MM

## (undated) DEVICE — SEE MEDLINE ITEM 157131

## (undated) DEVICE — COVER SURG LIGHT HANDLE

## (undated) DEVICE — BLADE INCISOR PLATINUM 4.5MM

## (undated) DEVICE — SEE MEDLINE ITEM 157166

## (undated) DEVICE — ALCOHOL 70% ISOP RUBBING 4OZ

## (undated) DEVICE — GUIDEWIRE INQWIRE SE 3MM JTIP

## (undated) DEVICE — SHAVER EXCALIBUR 5.5MM 13CM

## (undated) DEVICE — BLADE SURG CARBON STEEL SZ11

## (undated) DEVICE — GLOVE SURG ULTRA TOUCH 7

## (undated) DEVICE — SEE MEDLINE ITEM 157117

## (undated) DEVICE — GLOVE SURG ULTRA TOUCH 8.5

## (undated) DEVICE — SLEEVE SCD EXPRESS CALF MEDIUM

## (undated) DEVICE — Device

## (undated) DEVICE — PROBE ASPIR ABLATOR 90 DEG XLG

## (undated) DEVICE — BURR COOLCUT OVAL 5MMX13CM

## (undated) DEVICE — GUIDEWIRE OMNI STR TIP 185CM

## (undated) DEVICE — CANNULA THREADED 8.5 X 72MM

## (undated) DEVICE — MAT SURGICAL ECOSUCTIONER

## (undated) DEVICE — NDL BOX COUNTER

## (undated) DEVICE — TOWEL OR DISP STRL BLUE 4/PK

## (undated) DEVICE — NDL HYPODERMIC BLUNT 18G 1.5IN

## (undated) DEVICE — SLEEVE SCD EXPRESS KNEE MEDIUM

## (undated) DEVICE — CONTAINER SPECIMEN OR STER 4OZ

## (undated) DEVICE — TUBING ARTHROSCOPY

## (undated) DEVICE — IMMOBILIZER SHOULDER 52IN

## (undated) DEVICE — SYR DISP LL 5CC

## (undated) DEVICE — SYR 10CC LUER LOCK

## (undated) DEVICE — NDL SUREFIRE SCORPION RC

## (undated) DEVICE — SPONGE SUPER KERLIX 6X6.75IN

## (undated) DEVICE — DEVICE CHIA PERCPASSER

## (undated) DEVICE — CONNECTOR TUBING STR 5 IN 1

## (undated) DEVICE — TUBING SUC UNIV W/CONN 12FT

## (undated) DEVICE — CANNULA MTK THRD CLR 8.5X75MM

## (undated) DEVICE — SEE MEDLINE ITEM 152678

## (undated) DEVICE — SEE MEDLINE ITEM 152487

## (undated) DEVICE — GAUZE SPONGE BULKEE 6X6.75IN

## (undated) DEVICE — SPONGE BULKEE II ABSRB 6X6.75

## (undated) DEVICE — IMMOBILIZER SHOULDER X-LG

## (undated) DEVICE — KIT GLIDESHEATH SLEND 6FR 10CM

## (undated) DEVICE — CONTAINER SPECIMEN STRL 4OZ

## (undated) DEVICE — CONTRAST VISIPAQUE 150ML

## (undated) DEVICE — SLEEVE LATERAL TRACTION ARM

## (undated) DEVICE — CATH JL4 5FR

## (undated) DEVICE — CATH JL3.5 5FR

## (undated) DEVICE — NDL SUTURE CAPTURE FIRSTPASS

## (undated) DEVICE — GUIDE LAUNCHER 6FR EBU 3.5

## (undated) DEVICE — SEE MEDLINE ITEM 146270

## (undated) DEVICE — BOWL STERILE LARGE 32OZ

## (undated) DEVICE — CANNULA THREADED 7.0 X 72MM